# Patient Record
Sex: MALE | Race: BLACK OR AFRICAN AMERICAN | NOT HISPANIC OR LATINO | Employment: FULL TIME | URBAN - METROPOLITAN AREA
[De-identification: names, ages, dates, MRNs, and addresses within clinical notes are randomized per-mention and may not be internally consistent; named-entity substitution may affect disease eponyms.]

---

## 2017-01-11 ENCOUNTER — LAB VISIT (OUTPATIENT)
Dept: LAB | Facility: HOSPITAL | Age: 57
End: 2017-01-11
Attending: INTERNAL MEDICINE
Payer: MEDICARE

## 2017-01-11 DIAGNOSIS — Z94.0 KIDNEY REPLACED BY TRANSPLANT: ICD-10-CM

## 2017-01-11 LAB
BILIRUB UR QL STRIP: NEGATIVE
CLARITY UR REFRACT.AUTO: CLEAR
COLOR UR AUTO: YELLOW
CREAT UR-MCNC: 115 MG/DL
GLUCOSE UR QL STRIP: NEGATIVE
HGB UR QL STRIP: NEGATIVE
KETONES UR QL STRIP: NEGATIVE
LEUKOCYTE ESTERASE UR QL STRIP: NEGATIVE
NITRITE UR QL STRIP: NEGATIVE
PH UR STRIP: 8 [PH] (ref 5–8)
PROT UR QL STRIP: NEGATIVE
PROT UR-MCNC: <7 MG/DL
PROT/CREAT RATIO, UR: NORMAL
SP GR UR STRIP: 1.01 (ref 1–1.03)
URN SPEC COLLECT METH UR: NORMAL
UROBILINOGEN UR STRIP-ACNC: NEGATIVE EU/DL

## 2017-01-11 PROCEDURE — 84156 ASSAY OF PROTEIN URINE: CPT

## 2017-01-11 PROCEDURE — 81003 URINALYSIS AUTO W/O SCOPE: CPT

## 2017-01-18 ENCOUNTER — OFFICE VISIT (OUTPATIENT)
Dept: TRANSPLANT | Facility: CLINIC | Age: 57
End: 2017-01-18
Payer: MEDICARE

## 2017-01-18 VITALS
DIASTOLIC BLOOD PRESSURE: 101 MMHG | WEIGHT: 178.81 LBS | HEART RATE: 76 BPM | SYSTOLIC BLOOD PRESSURE: 153 MMHG | HEIGHT: 67 IN | TEMPERATURE: 98 F | OXYGEN SATURATION: 96 % | RESPIRATION RATE: 18 BRPM | BODY MASS INDEX: 28.07 KG/M2

## 2017-01-18 DIAGNOSIS — Z94.0 S/P KIDNEY TRANSPLANT: Primary | ICD-10-CM

## 2017-01-18 DIAGNOSIS — E83.39 HYPOPHOSPHATEMIA: Chronic | ICD-10-CM

## 2017-01-18 DIAGNOSIS — N18.2 CKD (CHRONIC KIDNEY DISEASE) STAGE 2, GFR 60-89 ML/MIN: ICD-10-CM

## 2017-01-18 DIAGNOSIS — E83.42 HYPOMAGNESEMIA: Chronic | ICD-10-CM

## 2017-01-18 DIAGNOSIS — E87.20 ACIDOSIS: ICD-10-CM

## 2017-01-18 DIAGNOSIS — I15.0 RENOVASCULAR HYPERTENSION: ICD-10-CM

## 2017-01-18 DIAGNOSIS — Z79.60 LONG-TERM USE OF IMMUNOSUPPRESSANT MEDICATION: ICD-10-CM

## 2017-01-18 DIAGNOSIS — Z29.89 PROPHYLACTIC IMMUNOTHERAPY: ICD-10-CM

## 2017-01-18 DIAGNOSIS — B34.8 BK VIREMIA: ICD-10-CM

## 2017-01-18 PROCEDURE — 99999 PR PBB SHADOW E&M-EST. PATIENT-LVL V: CPT | Mod: PBBFAC,,, | Performed by: NURSE PRACTITIONER

## 2017-01-18 PROCEDURE — 99215 OFFICE O/P EST HI 40 MIN: CPT | Mod: S$PBB,,, | Performed by: NURSE PRACTITIONER

## 2017-01-18 PROCEDURE — 99215 OFFICE O/P EST HI 40 MIN: CPT | Mod: PBBFAC | Performed by: NURSE PRACTITIONER

## 2017-01-18 RX ORDER — MYCOPHENOLATE MOFETIL 250 MG/1
250 CAPSULE ORAL 2 TIMES DAILY
Qty: 60 CAPSULE | Refills: 11 | Status: SHIPPED | OUTPATIENT
Start: 2017-01-18 | End: 2017-01-19 | Stop reason: SDUPTHER

## 2017-01-18 NOTE — PROGRESS NOTES
Kidney Post-Transplant Assessment    Referring Physician: Dino Schmidt  Current Nephrologist: Dino Schmidt    ORGAN: RIGHT KIDNEY  Donor Type:  - brain death  PHS Increased Risk: yes  Cold Ischemia: 355 mins  Induction Medications: campath - alemtuzumab (anti-cd52)    Subjective:     CC:  Reassessment of renal allograft function and management of chronic immunosuppression.    HPI:  Mr. Downing is a 56 y.o. year old Black or  male who received a  - brain death kidney transplant on 16.  He has CKD stage 2 - GFR 60-89 and his baseline creatinine is between 1.3-1.4. He takes tacrolimus for maintenance immunosuppression. He denies any recent hospitalizations or ER visits since his previous clinic visit.      HX: + BK, last three BK/ PCR (-)  Currently taking leflunomide 20 mg Qd     BP elevated. States it runs the same at home. Patient reports he is only taking Coreg BID and is not taking the nifedipine as prescribed.     Overall feels well. No health concerns today.   Denies chest pain, SOB, leg pain, abdominal pain or LUTs.    Past Medical History   Diagnosis Date    Anemia of chronic renal failure 12/3/2013    Awaiting kidney transplant since 6/3/11 12/3/2013    Blind right eye     Cataract     CKD (chronic kidney disease) stage 2, GFR 60-89 ml/min 2016    ESRD (end stage renal disease)     Hypertension     Latent TB - 11 - INH x 9 months 12/3/2013    Pulmonary hypertension - PASP 58mmHg () 12/3/2013    Secondary hyperparathyroidism, renal        Review of Systems   Constitutional: Negative for activity change, appetite change, chills, fatigue, fever and unexpected weight change.   HENT: Negative for congestion, facial swelling, postnasal drip, rhinorrhea, sinus pressure, sore throat and trouble swallowing.    Eyes: Negative for pain, redness and visual disturbance.   Respiratory: Negative for cough, chest tightness, shortness of breath and wheezing.  "   Cardiovascular: Negative.  Negative for chest pain, palpitations and leg swelling.   Gastrointestinal: Negative for abdominal pain, diarrhea, nausea and vomiting.   Genitourinary: Negative for dysuria, flank pain and urgency.   Musculoskeletal: Negative for gait problem, neck pain and neck stiffness.   Skin: Negative for rash.   Allergic/Immunologic: Positive for immunocompromised state. Negative for environmental allergies and food allergies.   Neurological: Negative for dizziness, weakness, light-headedness and headaches.   Psychiatric/Behavioral: Negative for agitation and confusion. The patient is not nervous/anxious.        Objective:     Blood pressure (!) 153/101, pulse 76, temperature 97.9 °F (36.6 °C), temperature source Oral, resp. rate 18, height 5' 7" (1.702 m), weight 81.1 kg (178 lb 12.7 oz), SpO2 96 %.body mass index is 28 kg/(m^2).    Physical Exam   Constitutional: He is oriented to person, place, and time. He appears well-developed and well-nourished.   HENT:   Head: Normocephalic.   Mouth/Throat: Oropharynx is clear and moist. No oropharyngeal exudate.   Eyes: Conjunctivae and EOM are normal. Pupils are equal, round, and reactive to light. No scleral icterus.   Neck: Normal range of motion. Neck supple.   Cardiovascular: Normal rate, regular rhythm and normal heart sounds.    Pulmonary/Chest: Effort normal and breath sounds normal.   Abdominal: Soft. Normal appearance and bowel sounds are normal. He exhibits no distension and no mass. There is no splenomegaly or hepatomegaly. There is no tenderness. There is no rebound, no guarding, no CVA tenderness, no tenderness at McBurney's point and negative Roberts's sign.       Musculoskeletal: Normal range of motion. He exhibits no edema.        Arms:  Lymphadenopathy:     He has no cervical adenopathy.   Neurological: He is alert and oriented to person, place, and time. He exhibits normal muscle tone. Coordination normal.   Skin: Skin is warm and dry. "   Psychiatric: He has a normal mood and affect. His behavior is normal.   Vitals reviewed.      Labs:  Lab Results   Component Value Date    WBC 5.60 01/11/2017    HGB 14.1 01/11/2017    HCT 41.5 01/11/2017     01/11/2017    K 5.4 (H) 01/11/2017     01/11/2017    CO2 26 01/11/2017    BUN 15 01/11/2017    CREATININE 1.4 01/11/2017    EGFRNONAA 55.4 (A) 01/11/2017    CALCIUM 10.1 01/11/2017    PHOS 2.6 (L) 01/11/2017    MG 1.5 (L) 01/11/2017    ALBUMIN 3.7 01/11/2017    ALBUMIN 3.7 01/11/2017    AST 20 01/11/2017    ALT 16 01/11/2017    UTPCR Unable to calculate 01/11/2017    .0 (H) 03/23/2016    TACROLIMUS 6.0 01/11/2017       No results found for: EXTANC, EXTWBC, EXTSEGS, EXTPLATELETS, EXTHEMOGLOBI, EXTHEMATOCRI, EXTCREATININ, EXTSODIUM, EXTPOTASSIUM, EXTBUN, EXTCO2, EXTCALCIUM, EXTPHOSPHORU, EXTGLUCOSE, EXTALBUMIN, EXTAST, EXTALT, EXTBILITOTAL, EXTLIPASE, EXTAMYLASE    No results found for: EXTCYCLOSLVL, EXTSIROLIMUS, EXTTACROLVL, EXTPROTCRE, EXTPTHINTACT, EXTPROTEINUA, EXTWBCUA, EXTRBCUA    Labs were reviewed with the patient.    Assessment:     1. S/P kidney transplant 1/28/2016    2. Long-term use of immunosuppressant medication    3. BK viremia    4. Prophylactic immunotherapy    5. Renovascular hypertension    6. CKD (chronic kidney disease) stage 2, GFR 60-89 ml/min    7. Acidosis    8. Hypophosphatemia    9. Hypomagnesemia        Plan:   Check BK and labs in 2 weeks  Ok to Stop leflunomide  Restart Cellcept 250 mg BID  If BK remains (-) in 2 weeks then increase MMF to 500 mg BID  --> recheck labs 2 weeks, then monthly     Follow-up:   1. CKD stage: 2 stable    2. Immunosuppression: No change, Prograf Trough 6.0, which is therapeutic--> target 5-7. Continue Prograf 3/2, restart  mg BID    Will continue to monitor for drug toxicities.     TACROLIMUS 6.0 01/11/2017     3. Allograft Function: Stable.  Continue good po hydration       Lab Results   Component Value Date    CREATININE 1.4  01/11/2017     eGFR if African American >60 mL/min/1.73 m^2 >60.0                 01/11/2017       4. Hypertension management: elevated , advise low salt diet and home BP monitoring    Coreg 12.5 mg BID, restart nifedipine 30 mg QD    5. Metabolic Bone Disease/Secondary Hyperparathyroidism:stable.    6. Electrolytes: Normal calcium. Bicarbonate is normal   01/11/2017   K 5.4 (H) 01/11/2017    01/11/2017   CO2 26 01/11/2017     CALCIUM 10.1 01/11/2017   PHOS 2.6 (L) 01/11/2017   MG 1.5 (L) 01/11/2017   Encouraged high phos low potassium diet  Continue mg ox 800 mg BID as prescribed     7. Anemia: stable. No need for intervention    Lab Results   Component Value Date    WBC 5.60 01/11/2017    HGB 14.1 01/11/2017    HCT 41.5 01/11/2017    MCV 83 01/11/2017     (L) 01/11/2017       8.  Cytopenias: no significant cytopenias will monitor as per our guidelines. Medicine list reviewed including potential causes of drug-induced cytopenias    9.Proteinuria: continue p/c ratio as per guidelines   UTPCR Unable to calculate 01/11/2017     10. BK virus infection screening:  Not detected, will continue to monitor/ guidelines  1/11/2017  BK Virus DNA, Blood Not detected Not detected   BK Virus DNA PCR, Quant, Blood <250 Copies/mL <250   Log BKV Copies/mL <2.40 Log (10) Copies/mL <2.40       11. Weight education: provided during the clinic visit   body mass index is 28 kg/(m^2).        12.Patient safety education regarding immunosuppression including prophylaxis posttransplant for CMV, PCP : Education provided about vaccination and prevention of infections       Follow-up:   Clinic: return to transplant clinic weekly for the first month after transplant; every 2 weeks during months 2-3; then at 6-, 9-, 12-, 18-, 24-, and 36- months post-transplant to reassess for complications from immunosuppression toxicity and monitor for rejection.  Annually thereafter.    Labs: since patient remains at high risk for rejection  and drug-related complications that warrant close monitoring, labs will be ordered as follows: continue twice weekly CBC, renal panel, and drug level for first month; then same labs once weekly through 3rd month post-transplant.  Urine for UA and protein/creatinine ratio monthly.  Urine BK - PCR at 1-, 3-, 6-, 9-, 12-, 18-, 24-, and 36- months post-transplant.  Hepatic panel at 1-, 2-, 3-, 6-, 9-, 12-, 18-, 24-, and 36- months post-transplant.    Delphine Amaral NP       Education:   Material provided to the patient.  Patient reminded to call with any health changes since these can be early signs of significant complications.  Also, I advised the patient to be sure any new medications or changes of old medications are discussed with either a pharmacist or physician knowledgeable with transplant to avoid rejection/drug toxicity related to significant drug interactions.    UNOS Patient Status  Functional Status: 80% - Normal activity with effort: some symptoms of disease  Physical Capacity: No Limitations

## 2017-01-18 NOTE — MR AVS SNAPSHOT
[facility]  [address]  [place] [address]  Phone: [phone]                  Cj Downing   2017 2:30 PM   Office Visit    Description:  Male : 1960   Provider:  Delphine Amaral NP   Department:  [facility]- Transplant           Reason for Visit     Kidney Transplant Evaluation           Diagnoses this Visit        Comments    S/P kidney transplant    -  Primary     Long-term use of immunosuppressant medication         BK viremia         Prophylactic immunotherapy         Renovascular hypertension         CKD (chronic kidney disease) stage 2, GFR 60-89 ml/min         Acidosis         Hypophosphatemia         Hypomagnesemia                To Do List           Future Appointments        Provider Department Dept Phone    2017 2:00 PM Derrick Ryan, NISSA [facility] - [facility] [phone]    2017 2:30 PM NISSA [facility] [phone]      Goals (5 Years of Data)     None      Follow-Up and Disposition     Return in about 6 months (around 2017).       These Medications        Disp Refills Start End    mycophenolate (CELLCEPT) 250 mg Cap 60 capsule 11 2017    Take 1 capsule (250 mg total) by mouth 2 (two) times daily. - Oral    Pharmacy: [facility] - [place], [place] - [address] Ph #: [phone]         [facility]     [facility] Care [facility] -  Assistance  Registered nurses in the [facility] provide clinical advisement, health education, appointment booking, and other advisory services.  Call for this free service at [phone].             Medications           Message regarding Medications     Verify the changes and/or additions to your medication regime listed below are the same as discussed with your clinician today.  If any of these changes or additions are incorrect, please notify your healthcare provider.        START taking these NEW  medications        Refills    mycophenolate (CELLCEPT) 250 mg Cap 11    Sig: Take 1 capsule (250 mg total) by mouth 2 (two) times daily.    Class: Normal    Route: Oral      STOP taking these medications     esomeprazole (NEXIUM) 20 MG capsule TAKE 1 CAPSULE BY MOUTH EVERY EVENING.           Verify that the below list of medications is an accurate representation of the medications you are currently taking.  If none reported, the list may be blank. If incorrect, please contact your healthcare provider. Carry this list with you in case of emergency.           Current Medications     carvedilol (COREG) 12.5 MG tablet Take 1 tablet (12.5 mg total) by mouth 2 (two) times daily with meals.    difluprednate (DUREZOL) 0.05 % Drop ophthalmic solution Place 1 drop into the right eye 4 (four) times daily.    fluorometholone 0.1% (FML) 0.1 % DrpS Place 1 drop into the left eye 2 (two) times daily.     leflunomide (ARAVA) 20 MG Tab Take 1 tablet (20 mg total) by mouth once daily.    magnesium oxide (MAG-OX) 400 mg tablet Take 2 tablets (800 mg total) by mouth 2 (two) times daily.    multivitamin (THERAGRAN) tablet Take 1 tablet by mouth once daily.    nifedipine 30 MG ORAL TR24 (PROCARDIA-XL) 30 MG (OSM) 24 hr tablet Take 1 tablet (30 mg total) by mouth once daily.    ranitidine (ZANTAC) 150 MG capsule Take 150 mg by mouth 2 (two) times daily.    sodium bicarbonate 650 MG tablet Take 2 tablets (1,300 mg total) by mouth 2 (two) times daily.    sodium polystyrene (KAYEXALATE) 15 gram/60 mL Susp Take 120 mLs (30 g total) by mouth once daily.    tacrolimus (PROGRAF) 1 MG Cap TAKE 3 MG AM AND 2 MG PM  Via oral Z94.0    mycophenolate (CELLCEPT) 250 mg Cap Take 1 capsule (250 mg total) by mouth 2 (two) times daily.           Clinical Reference Information           Vital Signs - Last Recorded  Most recent update: 1/18/2017  2:55 PM by Mayuri Fajardo MA    BP Pulse Temp Resp Ht Wt    (!) 153/101 (BP Location: Right arm, Patient  "Position: Sitting, BP Method: Automatic) 76 97.9 °F (36.6 °C) (Oral) 18 5' 7" (1.702 m) 81.1 kg (178 lb 12.7 oz)    SpO2 BMI             96% 28 kg/m2         Blood Pressure          Most Recent Value    BP  (!)  153/101      Allergies as of 1/18/2017     No Known Allergies      Immunizations Administered on Date of Encounter - 1/18/2017     None      Maintenance Dialysis History     Start End Type Comments Center    3/5/2007 1/28/2016 [facility]  [facility]            Current Dialysis Center Information     No center information to display.            Transplant Information        Txp Date Organ Coordinator Care Team    1/28/2016 Kidney Essence Wilkinson RN Referring Physician:  Dino Schmidt MD   Current Nephrologist:  Dino Schmidt MD   Surgeon:  Eduard Whitt MD   Assisting Surgeon:  Agusto Nevarez MD         Instructions    Restart:   Procardia XL ( nifedipine ) 30 mg tablet , take daily     Check BK and labs in 2 weeks  Ok to Stop leflunomide  Restart Cellcept 250 mg tabs two times daily             "

## 2017-01-18 NOTE — PATIENT INSTRUCTIONS
Restart:   Procardia XL ( nifedipine ) 30 mg tablet , take daily     Check BK and labs in 2 weeks  Ok to Stop leflunomide  Restart Cellcept 250 mg tabs two times daily

## 2017-01-19 DIAGNOSIS — Z94.0 S/P KIDNEY TRANSPLANT: ICD-10-CM

## 2017-01-19 DIAGNOSIS — Z79.60 LONG-TERM USE OF IMMUNOSUPPRESSANT MEDICATION: ICD-10-CM

## 2017-01-19 RX ORDER — MYCOPHENOLATE MOFETIL 250 MG/1
250 CAPSULE ORAL 2 TIMES DAILY
Qty: 60 CAPSULE | Refills: 11 | Status: SHIPPED | OUTPATIENT
Start: 2017-01-19 | End: 2017-06-29 | Stop reason: SDUPTHER

## 2017-01-19 RX ORDER — NIFEDIPINE 30 MG/1
30 TABLET, EXTENDED RELEASE ORAL DAILY
Qty: 30 TABLET | Refills: 11 | Status: SHIPPED | OUTPATIENT
Start: 2017-01-19 | End: 2017-06-25

## 2017-01-19 NOTE — TELEPHONE ENCOUNTER
----- Message from Julia Puga sent at 1/18/2017  4:28 PM CST -----  Contact: pt   Please call pt about 2 RX that he does have, please call

## 2017-02-01 ENCOUNTER — LAB VISIT (OUTPATIENT)
Dept: LAB | Facility: HOSPITAL | Age: 57
End: 2017-02-01
Attending: INTERNAL MEDICINE
Payer: MEDICARE

## 2017-02-01 DIAGNOSIS — N05.8 BK VIRUS NEPHROPATHY: ICD-10-CM

## 2017-02-01 DIAGNOSIS — Z94.0 KIDNEY REPLACED BY TRANSPLANT: ICD-10-CM

## 2017-02-01 DIAGNOSIS — B33.8 BK VIRUS NEPHROPATHY: ICD-10-CM

## 2017-02-01 LAB
ALBUMIN SERPL BCP-MCNC: 3.9 G/DL
ANION GAP SERPL CALC-SCNC: 9 MMOL/L
BASOPHILS # BLD AUTO: 0.04 K/UL
BASOPHILS NFR BLD: 0.8 %
BUN SERPL-MCNC: 14 MG/DL
CALCIUM SERPL-MCNC: 10.2 MG/DL
CHLORIDE SERPL-SCNC: 108 MMOL/L
CO2 SERPL-SCNC: 25 MMOL/L
CREAT SERPL-MCNC: 1.2 MG/DL
DIFFERENTIAL METHOD: NORMAL
EOSINOPHIL # BLD AUTO: 0.2 K/UL
EOSINOPHIL NFR BLD: 2.9 %
ERYTHROCYTE [DISTWIDTH] IN BLOOD BY AUTOMATED COUNT: 14.4 %
EST. GFR  (AFRICAN AMERICAN): >60 ML/MIN/1.73 M^2
EST. GFR  (NON AFRICAN AMERICAN): >60 ML/MIN/1.73 M^2
GLUCOSE SERPL-MCNC: 92 MG/DL
HCT VFR BLD AUTO: 41.1 %
HGB BLD-MCNC: 14.1 G/DL
LYMPHOCYTES # BLD AUTO: 1.5 K/UL
LYMPHOCYTES NFR BLD: 29.2 %
MAGNESIUM SERPL-MCNC: 1.8 MG/DL
MCH RBC QN AUTO: 28.4 PG
MCHC RBC AUTO-ENTMCNC: 34.3 %
MCV RBC AUTO: 83 FL
MONOCYTES # BLD AUTO: 0.6 K/UL
MONOCYTES NFR BLD: 11.2 %
NEUTROPHILS # BLD AUTO: 2.9 K/UL
NEUTROPHILS NFR BLD: 55.9 %
PHOSPHATE SERPL-MCNC: 2.8 MG/DL
PLATELET # BLD AUTO: 153 K/UL
PMV BLD AUTO: 11.8 FL
POTASSIUM SERPL-SCNC: 5.4 MMOL/L
RBC # BLD AUTO: 4.96 M/UL
SODIUM SERPL-SCNC: 142 MMOL/L
TACROLIMUS BLD-MCNC: 6.5 NG/ML
WBC # BLD AUTO: 5.11 K/UL

## 2017-02-01 PROCEDURE — 80197 ASSAY OF TACROLIMUS: CPT

## 2017-02-01 PROCEDURE — 36415 COLL VENOUS BLD VENIPUNCTURE: CPT

## 2017-02-01 PROCEDURE — 87799 DETECT AGENT NOS DNA QUANT: CPT

## 2017-02-01 PROCEDURE — 83735 ASSAY OF MAGNESIUM: CPT

## 2017-02-01 PROCEDURE — 80069 RENAL FUNCTION PANEL: CPT

## 2017-02-01 PROCEDURE — 85025 COMPLETE CBC W/AUTO DIFF WBC: CPT

## 2017-02-03 LAB
BK VIRUS DNA PCR, QUANT, BLOOD: <250 COPIES/ML
BK VIRUS DNA, BLOOD: DETECTED
LOG BKV COPIES/ML: <2.4 LOG (10) COPIES/ML

## 2017-02-07 DIAGNOSIS — Z94.0 KIDNEY REPLACED BY TRANSPLANT: Primary | ICD-10-CM

## 2017-02-15 ENCOUNTER — LAB VISIT (OUTPATIENT)
Dept: LAB | Facility: HOSPITAL | Age: 57
End: 2017-02-15
Attending: INTERNAL MEDICINE
Payer: MEDICARE

## 2017-02-15 DIAGNOSIS — N05.8 BK VIRUS NEPHROPATHY: ICD-10-CM

## 2017-02-15 DIAGNOSIS — Z94.0 KIDNEY REPLACED BY TRANSPLANT: ICD-10-CM

## 2017-02-15 DIAGNOSIS — B33.8 BK VIRUS NEPHROPATHY: ICD-10-CM

## 2017-02-15 LAB
ALBUMIN SERPL BCP-MCNC: 3.7 G/DL
ANION GAP SERPL CALC-SCNC: 6 MMOL/L
BASOPHILS # BLD AUTO: 0.06 K/UL
BASOPHILS NFR BLD: 1 %
BUN SERPL-MCNC: 14 MG/DL
CALCIUM SERPL-MCNC: 9.7 MG/DL
CHLORIDE SERPL-SCNC: 108 MMOL/L
CO2 SERPL-SCNC: 25 MMOL/L
CREAT SERPL-MCNC: 1.3 MG/DL
DIFFERENTIAL METHOD: ABNORMAL
EOSINOPHIL # BLD AUTO: 0.2 K/UL
EOSINOPHIL NFR BLD: 3.3 %
ERYTHROCYTE [DISTWIDTH] IN BLOOD BY AUTOMATED COUNT: 14.4 %
EST. GFR  (AFRICAN AMERICAN): >60 ML/MIN/1.73 M^2
EST. GFR  (NON AFRICAN AMERICAN): >60 ML/MIN/1.73 M^2
GLUCOSE SERPL-MCNC: 95 MG/DL
HCT VFR BLD AUTO: 40.3 %
HGB BLD-MCNC: 13.8 G/DL
LYMPHOCYTES # BLD AUTO: 1.9 K/UL
LYMPHOCYTES NFR BLD: 32.9 %
MAGNESIUM SERPL-MCNC: 1.2 MG/DL
MCH RBC QN AUTO: 27.9 PG
MCHC RBC AUTO-ENTMCNC: 34.2 %
MCV RBC AUTO: 82 FL
MONOCYTES # BLD AUTO: 0.5 K/UL
MONOCYTES NFR BLD: 8.1 %
NEUTROPHILS # BLD AUTO: 3.2 K/UL
NEUTROPHILS NFR BLD: 54.4 %
PHOSPHATE SERPL-MCNC: 2.7 MG/DL
PLATELET # BLD AUTO: 146 K/UL
PMV BLD AUTO: 11.3 FL
POTASSIUM SERPL-SCNC: 4.9 MMOL/L
RBC # BLD AUTO: 4.94 M/UL
SODIUM SERPL-SCNC: 139 MMOL/L
TACROLIMUS BLD-MCNC: 6.4 NG/ML
WBC # BLD AUTO: 5.81 K/UL

## 2017-02-15 PROCEDURE — 80197 ASSAY OF TACROLIMUS: CPT

## 2017-02-15 PROCEDURE — 87799 DETECT AGENT NOS DNA QUANT: CPT

## 2017-02-15 PROCEDURE — 80069 RENAL FUNCTION PANEL: CPT

## 2017-02-15 PROCEDURE — 85025 COMPLETE CBC W/AUTO DIFF WBC: CPT

## 2017-02-15 PROCEDURE — 83735 ASSAY OF MAGNESIUM: CPT

## 2017-02-17 LAB
BK VIRUS DNA PCR, QUANT, BLOOD: <250 COPIES/ML
BK VIRUS DNA, BLOOD: NOT DETECTED
LOG BKV COPIES/ML: <2.4 LOG (10) COPIES/ML

## 2017-02-21 RX ORDER — LANOLIN ALCOHOL/MO/W.PET/CERES
800 CREAM (GRAM) TOPICAL 3 TIMES DAILY
Qty: 180 TABLET | Refills: 11 | Status: SHIPPED | OUTPATIENT
Start: 2017-02-21 | End: 2018-01-31 | Stop reason: SDUPTHER

## 2017-02-21 NOTE — TELEPHONE ENCOUNTER
----- Message from Shilpa Rothman MD sent at 2/15/2017 10:41 AM CST -----  Mg low --> if he is taking MagOx 800 mg BID then increase to 800 mg TID.

## 2017-03-15 ENCOUNTER — LAB VISIT (OUTPATIENT)
Dept: LAB | Facility: HOSPITAL | Age: 57
End: 2017-03-15
Attending: INTERNAL MEDICINE
Payer: MEDICARE

## 2017-03-15 DIAGNOSIS — Z94.0 KIDNEY REPLACED BY TRANSPLANT: ICD-10-CM

## 2017-03-15 DIAGNOSIS — N05.8 BK VIRUS NEPHROPATHY: ICD-10-CM

## 2017-03-15 DIAGNOSIS — B33.8 BK VIRUS NEPHROPATHY: ICD-10-CM

## 2017-03-15 LAB
ALBUMIN SERPL BCP-MCNC: 3.7 G/DL
ANION GAP SERPL CALC-SCNC: 5 MMOL/L
BASOPHILS # BLD AUTO: 0.06 K/UL
BASOPHILS NFR BLD: 0.9 %
BUN SERPL-MCNC: 14 MG/DL
CALCIUM SERPL-MCNC: 10.1 MG/DL
CHLORIDE SERPL-SCNC: 107 MMOL/L
CO2 SERPL-SCNC: 26 MMOL/L
CREAT SERPL-MCNC: 1.4 MG/DL
DIFFERENTIAL METHOD: ABNORMAL
EOSINOPHIL # BLD AUTO: 0.2 K/UL
EOSINOPHIL NFR BLD: 2.4 %
ERYTHROCYTE [DISTWIDTH] IN BLOOD BY AUTOMATED COUNT: 14.6 %
EST. GFR  (AFRICAN AMERICAN): >60 ML/MIN/1.73 M^2
EST. GFR  (NON AFRICAN AMERICAN): 55.4 ML/MIN/1.73 M^2
GLUCOSE SERPL-MCNC: 96 MG/DL
HCT VFR BLD AUTO: 43.6 %
HGB BLD-MCNC: 14.7 G/DL
LYMPHOCYTES # BLD AUTO: 2 K/UL
LYMPHOCYTES NFR BLD: 30.2 %
MAGNESIUM SERPL-MCNC: 1.8 MG/DL
MCH RBC QN AUTO: 28.3 PG
MCHC RBC AUTO-ENTMCNC: 33.7 %
MCV RBC AUTO: 84 FL
MONOCYTES # BLD AUTO: 0.8 K/UL
MONOCYTES NFR BLD: 11.8 %
NEUTROPHILS # BLD AUTO: 3.7 K/UL
NEUTROPHILS NFR BLD: 54.4 %
PHOSPHATE SERPL-MCNC: 3 MG/DL
PLATELET # BLD AUTO: 173 K/UL
PMV BLD AUTO: 11.6 FL
POTASSIUM SERPL-SCNC: 4.9 MMOL/L
RBC # BLD AUTO: 5.2 M/UL
SODIUM SERPL-SCNC: 138 MMOL/L
TACROLIMUS BLD-MCNC: 7.9 NG/ML
WBC # BLD AUTO: 6.76 K/UL

## 2017-03-15 PROCEDURE — 87799 DETECT AGENT NOS DNA QUANT: CPT

## 2017-03-15 PROCEDURE — 80069 RENAL FUNCTION PANEL: CPT

## 2017-03-15 PROCEDURE — 36415 COLL VENOUS BLD VENIPUNCTURE: CPT

## 2017-03-15 PROCEDURE — 83735 ASSAY OF MAGNESIUM: CPT

## 2017-03-15 PROCEDURE — 85025 COMPLETE CBC W/AUTO DIFF WBC: CPT

## 2017-03-15 PROCEDURE — 80197 ASSAY OF TACROLIMUS: CPT

## 2017-04-06 ENCOUNTER — TELEPHONE (OUTPATIENT)
Dept: TRANSPLANT | Facility: CLINIC | Age: 57
End: 2017-04-06

## 2017-04-06 NOTE — TELEPHONE ENCOUNTER
----- Message from Lina Hayden sent at 4/5/2017  4:10 PM CDT -----  Contact: patient  Patient calling to get a refill on his prograf patient states he will be using a different pharmacy. Please call

## 2017-04-07 DIAGNOSIS — D63.1 ANEMIA OF CHRONIC RENAL FAILURE, STAGE 3 (MODERATE): Chronic | ICD-10-CM

## 2017-04-07 DIAGNOSIS — N18.2 CKD (CHRONIC KIDNEY DISEASE) STAGE 2, GFR 60-89 ML/MIN: ICD-10-CM

## 2017-04-07 DIAGNOSIS — N25.81 SECONDARY HYPERPARATHYROIDISM, RENAL: ICD-10-CM

## 2017-04-07 DIAGNOSIS — I10 ESSENTIAL HYPERTENSION: ICD-10-CM

## 2017-04-07 DIAGNOSIS — Z94.0 S/P KIDNEY TRANSPLANT: ICD-10-CM

## 2017-04-07 DIAGNOSIS — E55.9 VITAMIN D DEFICIENCY DISEASE: ICD-10-CM

## 2017-04-07 DIAGNOSIS — N18.30 ANEMIA OF CHRONIC RENAL FAILURE, STAGE 3 (MODERATE): Chronic | ICD-10-CM

## 2017-04-08 RX ORDER — CARVEDILOL 12.5 MG/1
TABLET ORAL
Qty: 60 TABLET | Refills: 11 | Status: SHIPPED | OUTPATIENT
Start: 2017-04-08 | End: 2018-04-17 | Stop reason: SDUPTHER

## 2017-04-12 ENCOUNTER — LAB VISIT (OUTPATIENT)
Dept: LAB | Facility: HOSPITAL | Age: 57
End: 2017-04-12
Attending: INTERNAL MEDICINE
Payer: MEDICARE

## 2017-04-12 DIAGNOSIS — Z94.0 KIDNEY REPLACED BY TRANSPLANT: ICD-10-CM

## 2017-04-12 LAB
ALBUMIN SERPL BCP-MCNC: 3.7 G/DL
ANION GAP SERPL CALC-SCNC: 8 MMOL/L
BASOPHILS # BLD AUTO: 0.03 K/UL
BASOPHILS NFR BLD: 0.4 %
BUN SERPL-MCNC: 15 MG/DL
CALCIUM SERPL-MCNC: 10 MG/DL
CHLORIDE SERPL-SCNC: 106 MMOL/L
CO2 SERPL-SCNC: 23 MMOL/L
CREAT SERPL-MCNC: 1.4 MG/DL
DIFFERENTIAL METHOD: NORMAL
EOSINOPHIL # BLD AUTO: 0.1 K/UL
EOSINOPHIL NFR BLD: 2.1 %
ERYTHROCYTE [DISTWIDTH] IN BLOOD BY AUTOMATED COUNT: 14.4 %
EST. GFR  (AFRICAN AMERICAN): >60 ML/MIN/1.73 M^2
EST. GFR  (NON AFRICAN AMERICAN): 55.4 ML/MIN/1.73 M^2
GLUCOSE SERPL-MCNC: 97 MG/DL
HCT VFR BLD AUTO: 43.2 %
HGB BLD-MCNC: 14.6 G/DL
LYMPHOCYTES # BLD AUTO: 2 K/UL
LYMPHOCYTES NFR BLD: 30.1 %
MAGNESIUM SERPL-MCNC: 1.5 MG/DL
MCH RBC QN AUTO: 28.1 PG
MCHC RBC AUTO-ENTMCNC: 33.8 %
MCV RBC AUTO: 83 FL
MONOCYTES # BLD AUTO: 0.8 K/UL
MONOCYTES NFR BLD: 11.2 %
NEUTROPHILS # BLD AUTO: 3.8 K/UL
NEUTROPHILS NFR BLD: 56.2 %
PHOSPHATE SERPL-MCNC: 3 MG/DL
PLATELET # BLD AUTO: 156 K/UL
PMV BLD AUTO: 11 FL
POTASSIUM SERPL-SCNC: 4.7 MMOL/L
RBC # BLD AUTO: 5.2 M/UL
SODIUM SERPL-SCNC: 137 MMOL/L
TACROLIMUS BLD-MCNC: 5.7 NG/ML
WBC # BLD AUTO: 6.77 K/UL

## 2017-04-12 PROCEDURE — 80069 RENAL FUNCTION PANEL: CPT

## 2017-04-12 PROCEDURE — 80197 ASSAY OF TACROLIMUS: CPT

## 2017-04-12 PROCEDURE — 83735 ASSAY OF MAGNESIUM: CPT

## 2017-04-12 PROCEDURE — 87799 DETECT AGENT NOS DNA QUANT: CPT

## 2017-04-12 PROCEDURE — 85025 COMPLETE CBC W/AUTO DIFF WBC: CPT

## 2017-05-17 ENCOUNTER — LAB VISIT (OUTPATIENT)
Dept: LAB | Facility: HOSPITAL | Age: 57
End: 2017-05-17
Attending: INTERNAL MEDICINE
Payer: MEDICARE

## 2017-05-17 DIAGNOSIS — Z94.0 KIDNEY REPLACED BY TRANSPLANT: ICD-10-CM

## 2017-05-17 LAB
ALBUMIN SERPL BCP-MCNC: 3.7 G/DL
ANION GAP SERPL CALC-SCNC: 4 MMOL/L
BASOPHILS # BLD AUTO: 0.04 K/UL
BASOPHILS NFR BLD: 0.7 %
BUN SERPL-MCNC: 13 MG/DL
CALCIUM SERPL-MCNC: 10.3 MG/DL
CHLORIDE SERPL-SCNC: 108 MMOL/L
CO2 SERPL-SCNC: 28 MMOL/L
CREAT SERPL-MCNC: 1.4 MG/DL
DIFFERENTIAL METHOD: ABNORMAL
EOSINOPHIL # BLD AUTO: 0.1 K/UL
EOSINOPHIL NFR BLD: 2.2 %
ERYTHROCYTE [DISTWIDTH] IN BLOOD BY AUTOMATED COUNT: 14.3 %
EST. GFR  (AFRICAN AMERICAN): >60 ML/MIN/1.73 M^2
EST. GFR  (NON AFRICAN AMERICAN): 55.4 ML/MIN/1.73 M^2
GLUCOSE SERPL-MCNC: 103 MG/DL
HCT VFR BLD AUTO: 40.9 %
HGB BLD-MCNC: 13.8 G/DL
LYMPHOCYTES # BLD AUTO: 1.7 K/UL
LYMPHOCYTES NFR BLD: 31.8 %
MAGNESIUM SERPL-MCNC: 1.8 MG/DL
MCH RBC QN AUTO: 28.2 PG
MCHC RBC AUTO-ENTMCNC: 33.7 %
MCV RBC AUTO: 84 FL
MONOCYTES # BLD AUTO: 0.4 K/UL
MONOCYTES NFR BLD: 7.7 %
NEUTROPHILS # BLD AUTO: 3.2 K/UL
NEUTROPHILS NFR BLD: 57.6 %
PHOSPHATE SERPL-MCNC: 2.5 MG/DL
PLATELET # BLD AUTO: 170 K/UL
PMV BLD AUTO: 10.7 FL
POTASSIUM SERPL-SCNC: 5.1 MMOL/L
RBC # BLD AUTO: 4.89 M/UL
SODIUM SERPL-SCNC: 140 MMOL/L
TACROLIMUS BLD-MCNC: 4.9 NG/ML
WBC # BLD AUTO: 5.48 K/UL

## 2017-05-17 PROCEDURE — 80197 ASSAY OF TACROLIMUS: CPT

## 2017-05-17 PROCEDURE — 80069 RENAL FUNCTION PANEL: CPT

## 2017-05-17 PROCEDURE — 83735 ASSAY OF MAGNESIUM: CPT

## 2017-05-17 PROCEDURE — 85025 COMPLETE CBC W/AUTO DIFF WBC: CPT

## 2017-05-17 PROCEDURE — 87799 DETECT AGENT NOS DNA QUANT: CPT

## 2017-05-17 NOTE — PROGRESS NOTES
Prograf slightly lower than goal of 5-7 but previously stable on this dose. WBC stable and BK negative --> increase MMF from 250 mg BID to 500 mg BID. Repeat labs including Prograf in 1-2 weeks

## 2017-05-18 ENCOUNTER — TELEPHONE (OUTPATIENT)
Dept: TRANSPLANT | Facility: CLINIC | Age: 57
End: 2017-05-18

## 2017-05-18 NOTE — TELEPHONE ENCOUNTER
----- Message from Shilpa Rothman MD sent at 5/17/2017  4:43 PM CDT -----  Prograf slightly lower than goal of 5-7 but previously stable on this dose. WBC stable and BK negative --> increase MMF from 250 mg BID to 500 mg BID. Repeat labs including Prograf in 1-2 weeks

## 2017-06-14 ENCOUNTER — LAB VISIT (OUTPATIENT)
Dept: LAB | Facility: HOSPITAL | Age: 57
End: 2017-06-14
Attending: INTERNAL MEDICINE
Payer: MEDICARE

## 2017-06-14 DIAGNOSIS — Z94.0 KIDNEY REPLACED BY TRANSPLANT: ICD-10-CM

## 2017-06-14 LAB
ALBUMIN SERPL BCP-MCNC: 3.9 G/DL
ALBUMIN SERPL BCP-MCNC: 3.9 G/DL
ALP SERPL-CCNC: 58 U/L
ALT SERPL W/O P-5'-P-CCNC: 10 U/L
ANION GAP SERPL CALC-SCNC: 7 MMOL/L
AST SERPL-CCNC: 19 U/L
BASOPHILS # BLD AUTO: 0.03 K/UL
BASOPHILS NFR BLD: 0.4 %
BILIRUB DIRECT SERPL-MCNC: 0.2 MG/DL
BILIRUB SERPL-MCNC: 0.6 MG/DL
BUN SERPL-MCNC: 12 MG/DL
CALCIUM SERPL-MCNC: 10.3 MG/DL
CHLORIDE SERPL-SCNC: 108 MMOL/L
CO2 SERPL-SCNC: 25 MMOL/L
CREAT SERPL-MCNC: 1.4 MG/DL
DIFFERENTIAL METHOD: ABNORMAL
EOSINOPHIL # BLD AUTO: 0.1 K/UL
EOSINOPHIL NFR BLD: 2 %
ERYTHROCYTE [DISTWIDTH] IN BLOOD BY AUTOMATED COUNT: 15 %
EST. GFR  (AFRICAN AMERICAN): >60 ML/MIN/1.73 M^2
EST. GFR  (NON AFRICAN AMERICAN): 55.4 ML/MIN/1.73 M^2
GLUCOSE SERPL-MCNC: 95 MG/DL
HCT VFR BLD AUTO: 44.5 %
HGB BLD-MCNC: 14.9 G/DL
LYMPHOCYTES # BLD AUTO: 2.6 K/UL
LYMPHOCYTES NFR BLD: 36 %
MAGNESIUM SERPL-MCNC: 1.9 MG/DL
MCH RBC QN AUTO: 27.9 PG
MCHC RBC AUTO-ENTMCNC: 33.5 %
MCV RBC AUTO: 83 FL
MONOCYTES # BLD AUTO: 0.5 K/UL
MONOCYTES NFR BLD: 7.5 %
NEUTROPHILS # BLD AUTO: 3.8 K/UL
NEUTROPHILS NFR BLD: 54 %
PHOSPHATE SERPL-MCNC: 2.9 MG/DL
PLATELET # BLD AUTO: 163 K/UL
PMV BLD AUTO: 10.6 FL
POTASSIUM SERPL-SCNC: 5.3 MMOL/L
PROT SERPL-MCNC: 8.1 G/DL
RBC # BLD AUTO: 5.34 M/UL
SODIUM SERPL-SCNC: 140 MMOL/L
TACROLIMUS BLD-MCNC: 7 NG/ML
WBC # BLD AUTO: 7.09 K/UL

## 2017-06-14 PROCEDURE — 84075 ASSAY ALKALINE PHOSPHATASE: CPT

## 2017-06-14 PROCEDURE — 85025 COMPLETE CBC W/AUTO DIFF WBC: CPT

## 2017-06-14 PROCEDURE — 83735 ASSAY OF MAGNESIUM: CPT

## 2017-06-14 PROCEDURE — 80197 ASSAY OF TACROLIMUS: CPT

## 2017-06-14 PROCEDURE — 87799 DETECT AGENT NOS DNA QUANT: CPT

## 2017-06-14 PROCEDURE — 80069 RENAL FUNCTION PANEL: CPT

## 2017-06-25 ENCOUNTER — HOSPITAL ENCOUNTER (EMERGENCY)
Facility: HOSPITAL | Age: 57
Discharge: HOME | End: 2017-06-25
Attending: EMERGENCY MEDICINE
Payer: MEDICARE

## 2017-06-25 VITALS
BODY MASS INDEX: 29.03 KG/M2 | TEMPERATURE: 98 F | DIASTOLIC BLOOD PRESSURE: 93 MMHG | OXYGEN SATURATION: 98 % | SYSTOLIC BLOOD PRESSURE: 136 MMHG | WEIGHT: 185 LBS | RESPIRATION RATE: 16 BRPM | HEIGHT: 67 IN | HEART RATE: 78 BPM

## 2017-06-25 DIAGNOSIS — S99.911A RIGHT ANKLE INJURY, INITIAL ENCOUNTER: ICD-10-CM
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
## 2017-06-25 DIAGNOSIS — S96.911A RIGHT ANKLE STRAIN, INITIAL ENCOUNTER: Primary | ICD-10-CM
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
## 2017-06-25 PROCEDURE — 99283 EMERGENCY DEPT VISIT LOW MDM: CPT | Mod: 25

## 2017-06-25 PROCEDURE — 25000003 PHARM REV CODE 250: Performed by: PHYSICIAN ASSISTANT

## 2017-06-25 PROCEDURE — 99284 EMERGENCY DEPT VISIT MOD MDM: CPT | Mod: ,,, | Performed by: PHYSICIAN ASSISTANT

## 2017-06-25 RX ORDER — HYDROCODONE BITARTRATE AND ACETAMINOPHEN 5; 325 MG/1; MG/1
1 TABLET ORAL EVERY 6 HOURS PRN
Qty: 12 TABLET | Refills: 0 | Status: SHIPPED | OUTPATIENT
Start: 2017-06-25 | End: 2017-06-29 | Stop reason: SDUPTHER

## 2017-06-25 RX ORDER — HYDROCODONE BITARTRATE AND ACETAMINOPHEN 5; 325 MG/1; MG/1
1 TABLET ORAL
Status: COMPLETED | OUTPATIENT
Start: 2017-06-25 | End: 2017-06-25

## 2017-06-25 RX ADMIN — HYDROCODONE BITARTRATE AND ACETAMINOPHEN 1 TABLET: 5; 325 TABLET ORAL at 12:06

## 2017-06-25 NOTE — ED NOTES
Patient identifiers verified and correct for Mr Downing  C/C: ankle pain  APPEARANCE: awake and alert in NAD.  SKIN: warm, dry and intact. No breakdown or bruising.  MUSCULOSKELETAL: Patient moving all extremities spontaneously, no obvious swelling or deformities noted. Ambulates independently. Edema to right ankle with positive pulses, sensation and mvmt,.  RESPIRATORY: no shortness of breath.  CARDIAC: ; 2+ distal pulses; no peripheral edema  ABDOMEN: S/ND/NT, Denies nausea, normoactive bowel sounds present in all four quadrants. Normal stool pattern.  : voids spontaneously without difficulty.  Neurologic: AAO x 4; follows commands equal strength in all extremities; denies numbness/tingling. PERRLA

## 2017-06-25 NOTE — ED PROVIDER NOTES
Encounter Date: 6/25/2017       History     Chief Complaint   Patient presents with    Ankle Injury     57 y/o male with history of ESRD s/p kidney transplant 1/28/2016, HTN, presents to the ER with chief complaint of right ankle pain since an injury at 9:15 this morning.  The patient was walking down stairs carrying a microwave. He missed a step and tripped down 2 steps.  He twisted his right ankle during the fall.  He is unable to put pressure on the right ankle.  His pain is rated 9/10.  He denies head injury, additional joint pain, or other complaints at this time.            Review of patient's allergies indicates:  No Known Allergies  Past Medical History:   Diagnosis Date    Anemia of chronic renal failure 12/3/2013    Awaiting kidney transplant since 6/3/11 12/3/2013    Blind right eye     Cataract     CKD (chronic kidney disease) stage 2, GFR 60-89 ml/min 2/24/2016    ESRD (end stage renal disease) 2008    Hypertension     Hypomagnesemia 1/18/2017    Hypophosphatemia 1/18/2017    Latent TB - 4/7/11 - INH x 9 months 12/3/2013    Pulmonary hypertension - PASP 58mmHg (9/13) 12/3/2013    Secondary hyperparathyroidism, renal      Past Surgical History:   Procedure Laterality Date    APPENDECTOMY      AV FISTULA PLACEMENT      EUSEBIO    COLONOSCOPY      CORNEAL TRANSPLANT  1997    LEFT EYE    CORNEAL TRANSPLANT  2010    RIGHT EYE    CORNEAL TRANSPLANT  2000     LEFT EYE    EYE SURGERY      KIDNEY TRANSPLANT      TONSILLECTOMY         Social History   Substance Use Topics    Smoking status: Never Smoker    Smokeless tobacco: Never Used    Alcohol use No     Review of Systems   Constitutional: Negative for chills and fever.   HENT: Negative for sore throat.    Respiratory: Negative for shortness of breath.    Cardiovascular: Negative for chest pain.   Gastrointestinal: Negative for nausea.   Genitourinary: Negative for dysuria.   Musculoskeletal: Positive for arthralgias and joint swelling.  Negative for back pain.   Skin: Negative for color change, rash and wound.   Neurological: Negative for weakness and numbness.   Hematological: Does not bruise/bleed easily.       Physical Exam     Initial Vitals [06/25/17 1014]   BP Pulse Resp Temp SpO2   (!) 136/93 78 16 98 °F (36.7 °C) 98 %      MAP       107.33         Physical Exam    Constitutional: He appears well-developed and well-nourished.   HENT:   Head: Atraumatic.   Mouth/Throat: Oropharynx is clear and moist.   Eyes: Conjunctivae and EOM are normal. Pupils are equal, round, and reactive to light.   Neck: Normal range of motion. Neck supple.   Cardiovascular: Normal rate and regular rhythm.   Pulmonary/Chest: Breath sounds normal. No respiratory distress. He has no wheezes. He has no rhonchi. He has no rales.   Abdominal: Soft. Bowel sounds are normal. There is no tenderness.   Musculoskeletal:        Right ankle: He exhibits decreased range of motion and swelling. He exhibits no ecchymosis, no deformity and normal pulse. Tenderness. Lateral malleolus tenderness found. Achilles tendon normal. Achilles tendon exhibits no pain and no defect.        Right lower leg: He exhibits no bony tenderness and no swelling.        Right foot: Normal.   Neurological: He is alert and oriented to person, place, and time. He has normal strength.   Skin: No rash noted.   Psychiatric: He has a normal mood and affect.         ED Course   Procedures  Labs Reviewed - No data to display                APC / Resident Notes:   Patient presents to the ER with chief complaint of right ankle pain since a mechanical fall this morning.  The patient tripped on stairs while carrying a microwave and twisted his right ankle.  He has been unable to ambulate since the injury.  I will order x-ray to rule out fracture.  I will give Norco for pain control and we have applied ice to the ankle.    Patient's x-rays negative for acute fracture or dislocation.  Patient will be placed in an ankle  air splint and provided with crutches to use for one week for treatment of ankle strain.  He is instructed on R.I.C.E therapy.  I'll prescribe Norco for pain control, he is advised to take stool softeners while taking this medication and only use Patient is comfortable with this plan.  He is given return precautions.  He is advised to follow-up with PCP within one week and ortho if symptoms continue in 1-2 weeks.  I discussed the care of this patient with my supervising MD.                      ED Course     Clinical Impression:   The primary encounter diagnosis was Right ankle strain, initial encounter. A diagnosis of Right ankle injury, initial encounter was also pertinent to this visit.                           MONA Guerrero  06/25/17 [id number]

## 2017-06-29 ENCOUNTER — OFFICE VISIT (OUTPATIENT)
Dept: INTERNAL MEDICINE | Facility: CLINIC | Age: 57
End: 2017-06-29
Payer: MEDICARE

## 2017-06-29 VITALS
WEIGHT: 180.13 LBS | HEIGHT: 67 IN | SYSTOLIC BLOOD PRESSURE: 132 MMHG | HEART RATE: 84 BPM | DIASTOLIC BLOOD PRESSURE: 84 MMHG | BODY MASS INDEX: 28.27 KG/M2

## 2017-06-29 DIAGNOSIS — N18.2 CKD (CHRONIC KIDNEY DISEASE) STAGE 2, GFR 60-89 ML/MIN: ICD-10-CM

## 2017-06-29 DIAGNOSIS — M25.571 ACUTE RIGHT ANKLE PAIN: ICD-10-CM

## 2017-06-29 DIAGNOSIS — Z79.60 LONG-TERM USE OF IMMUNOSUPPRESSANT MEDICATION: ICD-10-CM

## 2017-06-29 DIAGNOSIS — Z94.0 S/P KIDNEY TRANSPLANT: ICD-10-CM

## 2017-06-29 DIAGNOSIS — Z00.00 HEALTH CARE MAINTENANCE: Primary | ICD-10-CM

## 2017-06-29 DIAGNOSIS — I15.0 RENOVASCULAR HYPERTENSION: ICD-10-CM

## 2017-06-29 PROCEDURE — 99999 PR PBB SHADOW E&M-EST. PATIENT-LVL III: CPT | Mod: PBBFAC,,, | Performed by: INTERNAL MEDICINE

## 2017-06-29 PROCEDURE — 99213 OFFICE O/P EST LOW 20 MIN: CPT | Mod: PBBFAC | Performed by: INTERNAL MEDICINE

## 2017-06-29 PROCEDURE — 99214 OFFICE O/P EST MOD 30 MIN: CPT | Mod: S$PBB,,, | Performed by: INTERNAL MEDICINE

## 2017-06-29 RX ORDER — MYCOPHENOLATE MOFETIL 250 MG/1
500 CAPSULE ORAL 2 TIMES DAILY
Qty: 60 CAPSULE | Refills: 11 | COMMUNITY
Start: 2017-06-29 | End: 2017-06-30 | Stop reason: SDUPTHER

## 2017-06-29 RX ORDER — HYDROCODONE BITARTRATE AND ACETAMINOPHEN 5; 325 MG/1; MG/1
1 TABLET ORAL EVERY 6 HOURS PRN
Qty: 20 TABLET | Refills: 0 | Status: SHIPPED | OUTPATIENT
Start: 2017-06-29 | End: 2017-07-09

## 2017-06-29 NOTE — PROGRESS NOTES
"Subjective:       Patient ID: Cj Downing is a 56 y.o. male.    Chief Complaint: Annual Exam and Establish Care    HPI   55 yo M here to establish care and have yearly preventative healthcare visit.     He has hx kidney transplant 1/28/16. He has CKD stage 2 - GFR 60-89 and his baseline creatinine is between 1.3-1.4.   + BK, last three BK/ PCR (-) was on leflunomide 20 mg Qd. Finished?    Mmf 500 bid increased from 250mg bid by phone on 5/18   tac 3mg am; 2mg pm  Coreg 12.5 BID  Nifedipine    He sprained his ankle 5 days ago when missed step. He is still unable to put pressure on it. Was unable to put pressure on it immediately after. He continues to be unable to bear weight at all. He has been crawling or hoping around his home.     Review of Systems   Constitutional: Negative for fever.   HENT: Negative.    Eyes: Negative.    Respiratory: Negative for shortness of breath.    Cardiovascular: Negative for chest pain and leg swelling.   Gastrointestinal: Negative for abdominal pain, diarrhea, nausea and vomiting.   Genitourinary: Negative.    Musculoskeletal: Negative for arthralgias.   Skin: Negative for rash.   Psychiatric/Behavioral: Negative.        Objective:   /84   Pulse 84   Ht 5' 7" (1.702 m)   Wt 81.7 kg (180 lb 1.9 oz)   BMI 28.21 kg/m²      Physical Exam   Constitutional: He is oriented to person, place, and time. He appears well-developed and well-nourished.   In wheelchair with crutches   HENT:   Head: Normocephalic and atraumatic.   Eyes: Conjunctivae and EOM are normal. Pupils are equal, round, and reactive to light.   Neck: Neck supple. No thyromegaly present.   Cardiovascular: Normal rate, regular rhythm and normal heart sounds.    No murmur heard.  Pulmonary/Chest: Effort normal and breath sounds normal. No respiratory distress. He has no wheezes.   Abdominal: Soft. Bowel sounds are normal. He exhibits no distension. There is no tenderness.   Musculoskeletal: Normal range of " motion.   Impaired ROM of ankle, swelling over lateral malleolus   Neurological: He is alert and oriented to person, place, and time.   Skin: Skin is warm and dry. No rash noted.   Psychiatric: He has a normal mood and affect. Judgment and thought content normal.   Vitals reviewed.      Assessment:       1. Health care maintenance    2. S/P kidney transplant 1/28/2016    3. Long-term use of immunosuppressant medication    4. CKD (chronic kidney disease) stage 2, GFR 60-89 ml/min    5. Renovascular hypertension    6. Acute right ankle pain        Plan:       Cj was seen today for annual exam and establish care.    Diagnoses and all orders for this visit:    Health care maintenance  He did pneumonia and possibly tdap vaccines at [facility] [address]. Will send SALONI.     S/P kidney transplant 1/28/2016  Long-term use of immunosuppressant medication  CKD (chronic kidney disease) stage 2, GFR 60-89 ml/min  Renovascular hypertension  Followed by transplant  BP in goal range  Continue current meds    Acute right ankle pain  -     Ambulatory referral to Orthopedics  -     hydrocodone-acetaminophen 5-325mg (NORCO) 5-325 mg per tablet; Take 1 tablet by mouth every 6 (six) hours as needed.

## 2017-06-30 ENCOUNTER — TELEPHONE (OUTPATIENT)
Dept: INTERNAL MEDICINE | Facility: CLINIC | Age: 57
End: 2017-06-30

## 2017-06-30 ENCOUNTER — OFFICE VISIT (OUTPATIENT)
Dept: ORTHOPEDICS | Facility: CLINIC | Age: 57
End: 2017-06-30
Payer: MEDICARE

## 2017-06-30 VITALS — HEIGHT: 67 IN | WEIGHT: 181.69 LBS | BODY MASS INDEX: 28.52 KG/M2

## 2017-06-30 DIAGNOSIS — M25.471 RIGHT ANKLE SWELLING: ICD-10-CM

## 2017-06-30 DIAGNOSIS — Z94.0 S/P KIDNEY TRANSPLANT: ICD-10-CM

## 2017-06-30 DIAGNOSIS — Z79.60 LONG-TERM USE OF IMMUNOSUPPRESSANT MEDICATION: ICD-10-CM

## 2017-06-30 DIAGNOSIS — M25.571 ACUTE RIGHT ANKLE PAIN: ICD-10-CM

## 2017-06-30 DIAGNOSIS — S93.401A SPRAIN OF RIGHT ANKLE, UNSPECIFIED LIGAMENT, INITIAL ENCOUNTER: Primary | ICD-10-CM

## 2017-06-30 DIAGNOSIS — Z91.81 STATUS POST FALL: ICD-10-CM

## 2017-06-30 PROCEDURE — 99999 PR PBB SHADOW E&M-EST. PATIENT-LVL III: CPT | Mod: PBBFAC,,, | Performed by: NURSE PRACTITIONER

## 2017-06-30 PROCEDURE — 99213 OFFICE O/P EST LOW 20 MIN: CPT | Mod: S$PBB,,, | Performed by: NURSE PRACTITIONER

## 2017-06-30 PROCEDURE — 99213 OFFICE O/P EST LOW 20 MIN: CPT | Mod: PBBFAC | Performed by: NURSE PRACTITIONER

## 2017-06-30 RX ORDER — MYCOPHENOLATE MOFETIL 250 MG/1
500 CAPSULE ORAL 2 TIMES DAILY
Qty: 120 CAPSULE | Refills: 11 | Status: SHIPPED | OUTPATIENT
Start: 2017-06-30 | End: 2017-06-30 | Stop reason: SDUPTHER

## 2017-06-30 RX ORDER — MYCOPHENOLATE MOFETIL 250 MG/1
500 CAPSULE ORAL 2 TIMES DAILY
Qty: 120 CAPSULE | Refills: 11 | Status: SHIPPED | OUTPATIENT
Start: 2017-06-30 | End: 2017-07-06 | Stop reason: SDUPTHER

## 2017-06-30 RX ORDER — MYCOPHENOLATE MOFETIL 250 MG/1
500 CAPSULE ORAL 2 TIMES DAILY
Qty: 120 CAPSULE | Refills: 11 | COMMUNITY
Start: 2017-06-30 | End: 2017-06-30 | Stop reason: SDUPTHER

## 2017-06-30 NOTE — PROGRESS NOTES
SUBJECTIVE:     Chief Complaint & History of Present Illness:  Cj Downing is a 56 y.o. year old male who presents for evaluation of constant right ankle pain which started 5 days ago 6/25/2017. There is a history of trauma, he twisted his ankle going down the stairs.  The pain is located in the lateral aspect of the ankle.  The pain is described as aching.  It is aggravated by direct pressure and walking . He states that until yesterday he really couldn;t walk on it but he has start to put some light pressure on the ankle as tolerated. Associated symptoms include ability to bear weight, but with some pain.  There is not radiation into the foot.  Previous treatments include Aircast which he d/c because it was uncomfortable, rest, crutches, and norco which have provided minimal relief.  There is not a history of previous surgery to the ankle.  The patient does not use an assistive device per baseline.    Review of patient's allergies indicates:  No Known Allergies      Current Outpatient Prescriptions   Medication Sig Dispense Refill    carvedilol (COREG) 12.5 MG tablet TAKE 1 TABLET BY MOUTH TWICE DAILY WITH MEALS. 60 tablet 11    fluorometholone 0.1% (FML) 0.1 % DrpS Place 1 drop into the left eye 2 (two) times daily.       hydrocodone-acetaminophen 5-325mg (NORCO) 5-325 mg per tablet Take 1 tablet by mouth every 6 (six) hours as needed. 20 tablet 0    magnesium oxide (MAG-OX) 400 mg tablet Take 2 tablets (800 mg total) by mouth 3 (three) times daily. 180 tablet 11    multivitamin (THERAGRAN) tablet Take 1 tablet by mouth once daily.      sodium bicarbonate 650 MG tablet Take 2 tablets (1,300 mg total) by mouth 2 (two) times daily. 240 tablet 11    sodium polystyrene (KAYEXALATE) 15 gram/60 mL Susp Take 120 mLs (30 g total) by mouth once daily. (Patient taking differently: Take 30 g by mouth once daily. States he takes BID) 3600 mL 11    tacrolimus (PROGRAF) 1 MG Cap TAKE 3 MG AM AND 2 MG PM  Via  "oral Z94.0 150 capsule 11    mycophenolate (CELLCEPT) 250 mg Cap Take 2 capsules (500 mg total) by mouth 2 (two) times daily. 120 capsule 11     Current Facility-Administered Medications   Medication Dose Route Frequency Provider Last Rate Last Dose    pentamidine inhalation solution 300 mg  300 mg Inhalation Q30 Days Shilpa Rothman MD   300 mg at 09/15/16 1507       Past Medical History:   Diagnosis Date    Anemia of chronic renal failure 12/3/2013    Blind right eye     Cataract     CKD (chronic kidney disease) stage 2, GFR 60-89 ml/min 2/24/2016    ESRD (end stage renal disease) 2008    Hypertension     Hypomagnesemia 1/18/2017    Hypophosphatemia 1/18/2017    Latent TB - 4/7/11 - INH x 9 months 12/3/2013    Pulmonary hypertension - PASP 58mmHg (9/13) 12/3/2013    S/P kidney transplant 1/28/2016     Secondary hyperparathyroidism, renal        Past Surgical History:   Procedure Laterality Date    APPENDECTOMY      AV FISTULA PLACEMENT      EUSEBIO    COLONOSCOPY      CORNEAL TRANSPLANT  1997    LEFT EYE    CORNEAL TRANSPLANT  2010    RIGHT EYE    CORNEAL TRANSPLANT  2000     LEFT EYE    EYE SURGERY      KIDNEY TRANSPLANT      TONSILLECTOMY         Review of Systems:  Review of Systems   Constitution: Negative for chills, fever, weakness and malaise/fatigue.   Eyes: Negative for visual disturbance.   Cardiovascular: Negative for chest pain.   Hematologic/Lymphatic: Negative for bleeding problem.   Skin: Negative for color change, flushing, poor wound healing and rash.   Musculoskeletal: Positive for joint pain and joint swelling. Negative for falls, gout, muscle cramps, muscle weakness, myalgias and stiffness.   Neurological: Negative for dizziness, light-headedness, loss of balance, numbness and paresthesias.   Psychiatric/Behavioral: Negative for altered mental status.         OBJECTIVE:     PHYSICAL EXAM:  Height: 5' 7" (170.2 cm) Weight: 82.4 kg (181 lb 10.5 oz)  General    Nursing note " reviewed.  Constitutional: He is oriented to person, place, and time. He appears well-developed and well-nourished. No distress.   HENT:   Head: Atraumatic.   Nose: Nose normal.   Eyes: Conjunctivae and EOM are normal. Right eye exhibits no discharge. Left eye exhibits no discharge.   Pulmonary/Chest: Effort normal. No respiratory distress.   Neurological: He is alert and oriented to person, place, and time.   Psychiatric: He has a normal mood and affect. His behavior is normal. Judgment and thought content normal.         Right Ankle/Foot Exam     Inspection   Deformity: absent  Erythema: absent  Bruising: Ankle - absent Foot - absent  Effusion: Ankle effusion: Moderate lateral ankle swelling, mild globally. Foot - absent    Swelling   The patient is swollen on the lateral malleolus.    Pain   The patient exhibits pain of the lateral malleolus and peroneals.    Other   Sensation: normal    Comments:  ROM inhibited s/t to acute swelling, pain  Pt is NVI  No wounds        Vascular Exam     Right Pulses    Posterior Tibial:      2+        Edema  Right Lower Leg: absent    RADIOGRAPHS:  Xray reviewed of the right ankle showing no fracture or dislocation     ASSESSMENT/PLAN:     Plan:  Acute right ankle sprain- symptoms improving   - CAM boot, WBAT  - No NSAIDs, pt is 2016 kidney transplant pt  - Has Norco PRN for pain  - Rest, Ice, Elevate ankle  - RTC 2 wk, sooner as needed   - Pt in agreement with plan

## 2017-06-30 NOTE — TELEPHONE ENCOUNTER
----- Message from Clara Pichardo sent at 6/30/2017  8:29 AM CDT -----  Hello,  After reviewing this patient's medication profiles (in [facility] & the retail pharmacy system), it appears that the dose of cellcept needs to be entered in epic as a medication order, in order to be medicare compliant        Will you please send a new prescription for cellcept  that matches the patient's current Dose and Quantity to the [facility]?    Thank you!  Your [facility] Team.    Clara Whalen, Certified Pharmacy Tech  [facility]  [phone] phone  [phone] fax

## 2017-06-30 NOTE — LETTER
June 30, 2017      [facility]  [address], [address]  [facility] [address]  Phone: [phone]       Patient: Cj Downing   YOB: 1960  Date of Visit: 06/30/2017    To Whom It May Concern:    Cj was at [facility] on 06/30/2017. He must remain out of work for medical reasons until his next visit on 7/14/17. If necessary a new note will be provided at that time. If you have any questions or concerns, or if I can be of further assistance, please do not hesitate to contact me.    Sincerely,    Jessica Jung, NP

## 2017-06-30 NOTE — TELEPHONE ENCOUNTER
Per your phone note on 5/18/17 and per pt dose of MMF was increased from 250mg BID to 500mg BID. Can you please verify this is correct and send new order to pharmacy? The old order was still in for 250mg BID.

## 2017-06-30 NOTE — TELEPHONE ENCOUNTER
----- Message from Clara Pichardo sent at 6/30/2017  8:29 AM CDT -----  Hello,  After reviewing this patient's medication profiles (in [facility] & the retail pharmacy system), it appears that the dose of cellcept needs to be entered in epic as a medication order, in order to be medicare compliant        Will you please send a new prescription for cellcept  that matches the patient's current Dose and Quantity to the [facility]?    Thank you!  Your [facility] Team.    Clara Whalen, Certified Pharmacy Tech  [facility]  [phone] phone  [phone] fax

## 2017-06-30 NOTE — LETTER
June 30, 2017      Kajal Arora MD  [address]  [facility] [address]           [facility]  [address]  [facility] [address]  Phone: [phone]          Patient: Cj Downing   MR Number: [id number]   YOB: 1960   Date of Visit: 6/30/2017       Dear Dr. Kajal Arora:    Thank you for referring Cj Downing to me for evaluation. Attached you will find relevant portions of my assessment and plan of care.    If you have questions, please do not hesitate to call me. I look forward to following Cj Downing along with you.    Sincerely,    Jessica Jung, NP    Enclosure  CC:  No Recipients    If you would like to receive this communication electronically, please contact [email or web address] or [phone] to request more information on [employer] Link access.    For providers and/or their staff who would like to refer a patient to [facility], please contact us through our one-stop-shop provider referral line, [facility], at [phone].    If you feel you have received this communication in error or would no longer like to receive these types of communications, please e-mail [email or web address]

## 2017-07-06 DIAGNOSIS — Z94.0 S/P KIDNEY TRANSPLANT: ICD-10-CM

## 2017-07-06 DIAGNOSIS — Z79.60 LONG-TERM USE OF IMMUNOSUPPRESSANT MEDICATION: ICD-10-CM

## 2017-07-06 RX ORDER — MYCOPHENOLATE MOFETIL 250 MG/1
500 CAPSULE ORAL 2 TIMES DAILY
Qty: 120 CAPSULE | Refills: 11 | Status: SHIPPED | OUTPATIENT
Start: 2017-07-06 | End: 2017-07-28 | Stop reason: SINTOL

## 2017-07-14 ENCOUNTER — OFFICE VISIT (OUTPATIENT)
Dept: ORTHOPEDICS | Facility: CLINIC | Age: 57
End: 2017-07-14
Payer: MEDICARE

## 2017-07-14 VITALS — HEIGHT: 67 IN | WEIGHT: 179.44 LBS | BODY MASS INDEX: 28.16 KG/M2

## 2017-07-14 DIAGNOSIS — S93.401D SPRAIN OF RIGHT ANKLE, UNSPECIFIED LIGAMENT, SUBSEQUENT ENCOUNTER: Primary | ICD-10-CM

## 2017-07-14 LAB
BK VIRUS DNA PCR, QUANT, BLOOD: NORMAL
BK VIRUS DNA, BLOOD: NORMAL
LOG BKV COPIES/ML: NORMAL

## 2017-07-14 PROCEDURE — 99213 OFFICE O/P EST LOW 20 MIN: CPT | Mod: S$PBB,,, | Performed by: PHYSICIAN ASSISTANT

## 2017-07-14 PROCEDURE — 99213 OFFICE O/P EST LOW 20 MIN: CPT | Mod: PBBFAC | Performed by: PHYSICIAN ASSISTANT

## 2017-07-14 PROCEDURE — 99999 PR PBB SHADOW E&M-EST. PATIENT-LVL III: CPT | Mod: PBBFAC,,, | Performed by: PHYSICIAN ASSISTANT

## 2017-07-14 RX ORDER — TRAMADOL HYDROCHLORIDE 50 MG/1
50 TABLET ORAL
Qty: 30 TABLET | Refills: 0 | Status: SHIPPED | OUTPATIENT
Start: 2017-07-14 | End: 2017-07-24

## 2017-07-14 NOTE — LETTER
July 14, 2017    Cj Downing  [address]  [place] [address]             [facility]  [address]  [facility] [address]  Phone: [phone] To whom it may concern:    Please excuse Mr. Downing for 1 week due to right ankle injury. He may return to work with no restrictions on 7/21/17.     If you have any questions or concerns, please don't hesitate to call.    Sincerely,        Shila Aranda PA-C

## 2017-07-14 NOTE — PROGRESS NOTES
Subjective:      Patient ID: Cj Downing is a 56 y.o. male.    Chief Complaint: No chief complaint on file.    HPI  Patient returns for 2 week f/u for his right ankle sprain. He saw Thelma Jung NP 2 weeks ago and was placed in a boot. He wore the boot for about 1.5 weeks and d/c it because it was too heavy. He has been wearing an ankle sleeve. He says the ankle has gotten much better since last visit. He has mild pain with walking. Pain is worse at night. He takes norco prn. He has mild swelling.   Review of Systems   Constitution: Negative for chills, fever and night sweats.   Cardiovascular: Negative for chest pain.   Respiratory: Negative for cough and shortness of breath.    Hematologic/Lymphatic: Does not bruise/bleed easily.   Skin: Negative for color change.   Gastrointestinal: Negative for heartburn.   Genitourinary: Negative for dysuria.   Neurological: Negative for numbness and paresthesias.   Psychiatric/Behavioral: Negative for altered mental status.   Allergic/Immunologic: Negative for persistent infections.         Objective:            General    Vitals reviewed.  Constitutional: He is oriented to person, place, and time. He appears well-developed and well-nourished.   Cardiovascular: Normal rate.    Neurological: He is alert and oriented to person, place, and time.         Right Ankle/Foot Exam     Inspection   Erythema: absent    Tenderness   The patient is tender to palpation of the lateral malleolus.    Range of Motion   Ankle Joint   Dorsiflexion: abnormal   Plantar flexion: abnormal   Subtalar Joint   Inversion: abnormal   Eversion: abnormal     Tests   Anterior drawer: negative  Varus tilt: negative    Other   Sensation: normal    Comments:  Mild swelling lateral ankle. ROM is a little limited due to stiffness.         Vascular Exam     Right Pulses  Dorsalis Pedis:      2+                      Assessment:       Encounter Diagnosis   Name Primary?    Sprain of right ankle, unspecified  ligament, subsequent encounter Yes          Plan:       Patient will continue the ankle sleeve for another 2 weeks. Work on ROM daily. Ice and elevate for swelling. Prescription for tramadol given with 0 refills. Discussed dept pain medicine policy. He will have to get future refills from his PCP. RTC in 3 weeks for re-evaluation.

## 2017-07-20 DIAGNOSIS — Z94.0 KIDNEY REPLACED BY TRANSPLANT: Primary | ICD-10-CM

## 2017-07-26 ENCOUNTER — LAB VISIT (OUTPATIENT)
Dept: LAB | Facility: HOSPITAL | Age: 57
End: 2017-07-26
Attending: INTERNAL MEDICINE
Payer: MEDICARE

## 2017-07-26 DIAGNOSIS — Z94.0 KIDNEY REPLACED BY TRANSPLANT: ICD-10-CM

## 2017-07-26 LAB
ALBUMIN SERPL BCP-MCNC: 3.7 G/DL
ALBUMIN SERPL BCP-MCNC: 3.7 G/DL
ALP SERPL-CCNC: 55 U/L
ALT SERPL W/O P-5'-P-CCNC: 12 U/L
ANION GAP SERPL CALC-SCNC: 8 MMOL/L
AST SERPL-CCNC: 18 U/L
BASOPHILS # BLD AUTO: 0.03 K/UL
BASOPHILS NFR BLD: 0.4 %
BILIRUB DIRECT SERPL-MCNC: 0.3 MG/DL
BILIRUB SERPL-MCNC: 0.6 MG/DL
BUN SERPL-MCNC: 13 MG/DL
CALCIUM SERPL-MCNC: 9.8 MG/DL
CHLORIDE SERPL-SCNC: 107 MMOL/L
CO2 SERPL-SCNC: 23 MMOL/L
CREAT SERPL-MCNC: 1.4 MG/DL
DIFFERENTIAL METHOD: ABNORMAL
EOSINOPHIL # BLD AUTO: 0.2 K/UL
EOSINOPHIL NFR BLD: 2.3 %
ERYTHROCYTE [DISTWIDTH] IN BLOOD BY AUTOMATED COUNT: 15 %
EST. GFR  (AFRICAN AMERICAN): >60 ML/MIN/1.73 M^2
EST. GFR  (NON AFRICAN AMERICAN): 55.4 ML/MIN/1.73 M^2
GLUCOSE SERPL-MCNC: 103 MG/DL
HCT VFR BLD AUTO: 39.9 %
HGB BLD-MCNC: 13.7 G/DL
LYMPHOCYTES # BLD AUTO: 2.2 K/UL
LYMPHOCYTES NFR BLD: 32.5 %
MAGNESIUM SERPL-MCNC: 1.5 MG/DL
MCH RBC QN AUTO: 27.8 PG
MCHC RBC AUTO-ENTMCNC: 34.3 G/DL
MCV RBC AUTO: 81 FL
MONOCYTES # BLD AUTO: 0.6 K/UL
MONOCYTES NFR BLD: 9.4 %
NEUTROPHILS # BLD AUTO: 3.8 K/UL
NEUTROPHILS NFR BLD: 55.1 %
PHOSPHATE SERPL-MCNC: 2.7 MG/DL
PLATELET # BLD AUTO: 191 K/UL
PMV BLD AUTO: 10.5 FL
POTASSIUM SERPL-SCNC: 5 MMOL/L
PROT SERPL-MCNC: 7.8 G/DL
RBC # BLD AUTO: 4.93 M/UL
SODIUM SERPL-SCNC: 138 MMOL/L
TACROLIMUS BLD-MCNC: 8.4 NG/ML
WBC # BLD AUTO: 6.81 K/UL

## 2017-07-26 PROCEDURE — 80197 ASSAY OF TACROLIMUS: CPT

## 2017-07-26 PROCEDURE — 83735 ASSAY OF MAGNESIUM: CPT

## 2017-07-26 PROCEDURE — 36415 COLL VENOUS BLD VENIPUNCTURE: CPT

## 2017-07-26 PROCEDURE — 87799 DETECT AGENT NOS DNA QUANT: CPT

## 2017-07-26 PROCEDURE — 80069 RENAL FUNCTION PANEL: CPT

## 2017-07-26 PROCEDURE — 82247 BILIRUBIN TOTAL: CPT

## 2017-07-26 PROCEDURE — 85025 COMPLETE CBC W/AUTO DIFF WBC: CPT

## 2017-07-26 PROCEDURE — 84075 ASSAY ALKALINE PHOSPHATASE: CPT

## 2017-07-27 LAB
BK VIRUS DNA PCR, QUANT, BLOOD: 194 COPIES/ML
BK VIRUS DNA, BLOOD: DETECTED
LOG BKV COPIES/ML: 2.29 LOG (10) COPIES/ML

## 2017-07-28 ENCOUNTER — TELEPHONE (OUTPATIENT)
Dept: TRANSPLANT | Facility: CLINIC | Age: 57
End: 2017-07-28

## 2017-07-28 NOTE — PROGRESS NOTES
+ BK: kidney function is stable. Please hold MMF, check CYLEX and BK PCR every 2 weeks. Will see him at the clinic next week.

## 2017-07-28 NOTE — TELEPHONE ENCOUNTER
----- Message from Sharon Jones MD sent at 7/28/2017  2:25 PM CDT -----  + BK: kidney function is stable. Please hold MMF, check CYLEX and BK PCR every 2 weeks. Will see him at the clinic next week.

## 2017-07-29 LAB — LEFLUNOMIDE METABOLITE: <0.01 MCG/ML

## 2017-08-02 ENCOUNTER — OFFICE VISIT (OUTPATIENT)
Dept: ORTHOPEDICS | Facility: CLINIC | Age: 57
End: 2017-08-02
Payer: MEDICARE

## 2017-08-02 VITALS — WEIGHT: 180.31 LBS | BODY MASS INDEX: 28.3 KG/M2 | HEIGHT: 67 IN

## 2017-08-02 DIAGNOSIS — S93.401D SPRAIN OF RIGHT ANKLE, UNSPECIFIED LIGAMENT, SUBSEQUENT ENCOUNTER: Primary | ICD-10-CM

## 2017-08-02 PROCEDURE — 3008F BODY MASS INDEX DOCD: CPT | Mod: ,,, | Performed by: PHYSICIAN ASSISTANT

## 2017-08-02 PROCEDURE — 99213 OFFICE O/P EST LOW 20 MIN: CPT | Mod: PBBFAC | Performed by: PHYSICIAN ASSISTANT

## 2017-08-02 PROCEDURE — 99213 OFFICE O/P EST LOW 20 MIN: CPT | Mod: S$PBB,,, | Performed by: PHYSICIAN ASSISTANT

## 2017-08-02 PROCEDURE — 99999 PR PBB SHADOW E&M-EST. PATIENT-LVL III: CPT | Mod: PBBFAC,,, | Performed by: PHYSICIAN ASSISTANT

## 2017-08-02 NOTE — PROGRESS NOTES
Subjective:      Patient ID: Cj Downing is a 56 y.o. male.    Chief Complaint: No chief complaint on file.    HPI  Patient returns for 5 week f/u for his right ankle sprain. He wears the boot while working to help support. He works 5 hours/day. Pain tends to be worse towards the end of the work day. Outside of work, he wears tennis shoes. He says he limps a little. He takes tramadol prn. He says the swelling is better. He has been working on ROM.   Review of Systems   Constitution: Negative for chills, fever and night sweats.   Cardiovascular: Negative for chest pain.   Respiratory: Negative for cough and shortness of breath.    Hematologic/Lymphatic: Does not bruise/bleed easily.   Skin: Negative for color change.   Gastrointestinal: Negative for heartburn.   Genitourinary: Negative for dysuria.   Neurological: Negative for numbness and paresthesias.   Psychiatric/Behavioral: Negative for altered mental status.   Allergic/Immunologic: Negative for persistent infections.         Objective:            General    Vitals reviewed.  Constitutional: He is oriented to person, place, and time. He appears well-developed and well-nourished.   Cardiovascular: Normal rate.    Neurological: He is alert and oriented to person, place, and time.         Right Ankle/Foot Exam     Inspection   Erythema: absent    Tenderness   The patient is tender to palpation of the lateral malleolus.    Range of Motion   The patient has normal right ankle ROM.    Tests   Anterior drawer: negative  Varus tilt: negative    Other   Sensation: normal        Vascular Exam     Right Pulses  Dorsalis Pedis:      2+                      Assessment:       Encounter Diagnosis   Name Primary?    Sprain of right ankle, unspecified ligament, subsequent encounter Yes          Plan:       Order was placed for PT. He will work on weaning out of the boot. Continue ROM. RTC in 4 weeks for re-evaluation.

## 2017-08-08 NOTE — PROGRESS NOTES
Kidney Post-Transplant Assessment    Referring Physician: Dino Schmidt  Current Nephrologist: Dino Schmidt    ORGAN: RIGHT KIDNEY  Donor Type:  - brain death  PHS Increased Risk: yes  Cold Ischemia: 355 mins  Induction Medications: campath - alemtuzumab (anti-cd52)    Subjective:     CC:  Reassessment of renal allograft function and management of chronic immunosuppression.    HPI:  Mr. Downing is a 56 y.o. year old Black or  male who has a History of ESRD presumed secondary to HTN. He was on dialysis since 3/7/2007,  until receiving PHS increased risk DBD DDRT (Campath induction, CMV D+/R+) on 16 .   He has CKD stage 2 - GFR 60-89 and his baseline creatinine is between 1.2-1.4. He takes tacrolimus for maintenance immunosuppression. Recent hospitalizations or ER visits since his previous clinic visit.-- ED for ankle strain . Reports ankle is feeling better, but he still has a slight limp.      HX: + BK, in which he was  tx with leflunomide     BK PCR detected/ 194  With 2.29 copies --> MMF was held with BK PCR and cylex  every other week.    Overall feels well. No health concerns today.   Denies chest pain, SOB, leg pain, abdominal pain or LUTs.      Past Medical History:   Diagnosis Date    Anemia of chronic renal failure 12/3/2013    Blind right eye     Cataract     CKD (chronic kidney disease) stage 2, GFR 60-89 ml/min 2016    ESRD (end stage renal disease)     Hypertension     Hypomagnesemia 2017    Hypophosphatemia 2017    Latent TB - 11 - INH x 9 months 12/3/2013    Pulmonary hypertension - PASP 58mmHg () 12/3/2013    S/P kidney transplant 2016     Secondary hyperparathyroidism, renal        Review of Systems   Constitutional: Negative for activity change, appetite change, chills, fatigue, fever and unexpected weight change.   HENT: Negative for congestion, facial swelling, postnasal drip, rhinorrhea, sinus pressure, sore  "throat and trouble swallowing.    Eyes: Positive for visual disturbance. Negative for pain and redness.        Blind right eye   Respiratory: Negative for cough, chest tightness, shortness of breath and wheezing.    Cardiovascular: Negative.  Negative for chest pain, palpitations and leg swelling.   Gastrointestinal: Negative for abdominal pain, diarrhea, nausea and vomiting.   Genitourinary: Negative for dysuria, flank pain and urgency.   Musculoskeletal: Negative for gait problem, neck pain and neck stiffness.   Skin: Negative for rash.   Allergic/Immunologic: Positive for immunocompromised state. Negative for environmental allergies and food allergies.   Neurological: Negative for dizziness, weakness, light-headedness and headaches.   Psychiatric/Behavioral: Negative for agitation and confusion. The patient is not nervous/anxious.        Objective:     Blood pressure (!) 149/103, pulse 76, temperature 98.7 °F (37.1 °C), temperature source Oral, resp. rate 16, height 5' 7" (1.702 m), weight 82.7 kg (182 lb 5.1 oz), SpO2 99 %.body mass index is 28.56 kg/m².    Physical Exam   Musculoskeletal:        Arms:      Labs:  Lab Results   Component Value Date    WBC 6.81 07/26/2017    HGB 13.7 (L) 07/26/2017    HCT 39.9 (L) 07/26/2017     07/26/2017    K 5.0 07/26/2017     07/26/2017    CO2 23 07/26/2017    BUN 13 07/26/2017    CREATININE 1.4 07/26/2017    EGFRNONAA 55.4 (A) 07/26/2017    CALCIUM 9.8 07/26/2017    PHOS 2.7 07/26/2017    MG 1.5 (L) 07/26/2017    ALBUMIN 3.7 07/26/2017    ALBUMIN 3.7 07/26/2017    AST 18 07/26/2017    ALT 12 07/26/2017    UTPCR Unable to calculate 07/26/2017    .0 (H) 03/23/2016    TACROLIMUS 8.4 07/26/2017       No results found for: EXTANC, EXTWBC, EXTSEGS, EXTPLATELETS, EXTHEMOGLOBI, EXTHEMATOCRI, EXTCREATININ, EXTSODIUM, EXTPOTASSIUM, EXTBUN, EXTCO2, EXTCALCIUM, EXTPHOSPHORU, EXTGLUCOSE, EXTALBUMIN, EXTAST, EXTALT, EXTBILITOTAL, EXTLIPASE, EXTAMYLASE    No results " found for: EXTCYCLOSLVL, EXTSIROLIMUS, EXTTACROLVL, EXTPROTCRE, EXTPTHINTACT, EXTPROTEINUA, EXTWBCUA, EXTRBCUA    Labs were reviewed with the patient.    Assessment:     1. S/P kidney transplant 1/28/2016    2. Prophylactic immunotherapy    3. Long-term use of immunosuppressant medication    4. Hypomagnesemia    5. Renovascular hypertension    6. CKD (chronic kidney disease) stage 2, GFR 60-89 ml/min    7. Anemia of chronic renal failure, stage 2 (mild)    8. BK viremia        Plan:   Cylex  Continue BK PCR every other week  Scheduled on 8/16     Patient needs to establish  with general nephrology    Referred to general nephrology     Follow-up:   1. CKD stage: 2  stable    2. Immunosuppression:   Prograf Trough 8.4, which is supra therapeutic--> but will leave at current dose and adjust as needed when cylex is resulted. .  Prograf 3/2, MMF  On hold d/t BK viremia   Will continue to monitor for drug toxicities.      3. Allograft Function: Stable.  Continue good po hydration .        Lab Results   Component Value Date    CREATININE 1.4 07/26/2017 7/26/2017  American >60 mL/min/1.73 m^2 >60.0       4. Hypertension management: elevated, but patient reports he has been out of Nifedipine for 2 weeks, advise low salt diet and home BP monitoring    coreg 12.5 mg BID , refilled-->nifedipine 30 mg QD    5. Metabolic Bone Disease/Secondary Hyperparathyroidism:stable.    Will monitor Vit D, PTH and CA / protocol   MG 1.5 (L) 07/26/2017     Lab Results   Component Value Date    .0 (H) 03/23/2016    CALCIUM 9.8 07/26/2017    PHOS 2.7 07/26/2017   Mg ox 800 mg TID    6. Electrolytes: Normal calcium. Bicarbonate is normal  Lab Results   Component Value Date     07/26/2017    K 5.0 07/26/2017     07/26/2017    CO2 23 07/26/2017   sodium bicarb 1300 mg BID     7. Anemia: stable. No need for intervention    Lab Results   Component Value Date    WBC 6.81 07/26/2017    HGB 13.7 (L) 07/26/2017    HCT 39.9 (L)  07/26/2017    MCV 81 (L) 07/26/2017     07/26/2017       8.  Cytopenias: no significant cytopenias will monitor as per our guidelines. Medicine list reviewed including potential causes of drug-induced cytopenias    9.Proteinuria: continue p/c ratio as per guidelines    7/26/2017  Prot/Creat Ratio, Ur 0.00 - 0.20  Unable to calculate       10. BK virus infection screening:   will continue to monitor/ guidelines  7/26/2017  BK Virus DNA, Blood Not detected  DETECTED    BK Virus DNA PCR, Quant, Blood <125 Copies/mL 194    Log BKV Copies/mL <2.10 Log (10) Copies/mL 2.29        11. Weight education: provided during the clinic visit   Body mass index is 28.56 kg/m².       12.Patient safety education regarding immunosuppression including prophylaxis posttransplant for CMV, PCP : Education provided about vaccination and prevention of infections       Follow-up:   Clinic: return to transplant clinic weekly for the first month after transplant; every 2 weeks during months 2-3; then at 6-, 9-, 12-, 18-, 24-, and 36- months post-transplant to reassess for complications from immunosuppression toxicity and monitor for rejection.  Annually thereafter.    Labs: since patient remains at high risk for rejection and drug-related complications that warrant close monitoring, labs will be ordered as follows: continue twice weekly CBC, renal panel, and drug level for first month; then same labs once weekly through 3rd month post-transplant.  Urine for UA and protein/creatinine ratio monthly.  Urine BK - PCR at 1-, 3-, 6-, 9-, 12-, 18-, 24-, and 36- months post-transplant.  Hepatic panel at 1-, 2-, 3-, 6-, 9-, 12-, 18-, 24-, and 36- months post-transplant.    Delphine Amaral NP       Education:   Material provided to the patient.  Patient reminded to call with any health changes since these can be early signs of significant complications.  Also, I advised the patient to be sure any new medications or changes of old medications are  discussed with either a pharmacist or physician knowledgeable with transplant to avoid rejection/drug toxicity related to significant drug interactions.    UNOS Patient Status  Functional Status: 80% - Normal activity with effort: some symptoms of disease  Physical Capacity: No Limitations

## 2017-08-09 ENCOUNTER — OFFICE VISIT (OUTPATIENT)
Dept: TRANSPLANT | Facility: CLINIC | Age: 57
End: 2017-08-09
Payer: MEDICARE

## 2017-08-09 VITALS
WEIGHT: 182.31 LBS | BODY MASS INDEX: 28.61 KG/M2 | HEIGHT: 67 IN | OXYGEN SATURATION: 99 % | SYSTOLIC BLOOD PRESSURE: 149 MMHG | HEART RATE: 76 BPM | RESPIRATION RATE: 16 BRPM | TEMPERATURE: 99 F | DIASTOLIC BLOOD PRESSURE: 103 MMHG

## 2017-08-09 DIAGNOSIS — E83.42 HYPOMAGNESEMIA: Chronic | ICD-10-CM

## 2017-08-09 DIAGNOSIS — I15.0 RENOVASCULAR HYPERTENSION: ICD-10-CM

## 2017-08-09 DIAGNOSIS — D63.1 ANEMIA OF CHRONIC RENAL FAILURE, STAGE 2 (MILD): Chronic | ICD-10-CM

## 2017-08-09 DIAGNOSIS — Z79.60 LONG-TERM USE OF IMMUNOSUPPRESSANT MEDICATION: ICD-10-CM

## 2017-08-09 DIAGNOSIS — Z94.0 S/P KIDNEY TRANSPLANT: Primary | ICD-10-CM

## 2017-08-09 DIAGNOSIS — B34.8 BK VIREMIA: ICD-10-CM

## 2017-08-09 DIAGNOSIS — Z29.89 PROPHYLACTIC IMMUNOTHERAPY: ICD-10-CM

## 2017-08-09 DIAGNOSIS — N18.2 CKD (CHRONIC KIDNEY DISEASE) STAGE 2, GFR 60-89 ML/MIN: ICD-10-CM

## 2017-08-09 DIAGNOSIS — Z94.0 S/P KIDNEY TRANSPLANT: ICD-10-CM

## 2017-08-09 DIAGNOSIS — N18.2 ANEMIA OF CHRONIC RENAL FAILURE, STAGE 2 (MILD): Chronic | ICD-10-CM

## 2017-08-09 PROCEDURE — 99214 OFFICE O/P EST MOD 30 MIN: CPT | Mod: PBBFAC | Performed by: NURSE PRACTITIONER

## 2017-08-09 PROCEDURE — 99214 OFFICE O/P EST MOD 30 MIN: CPT | Mod: S$PBB,,, | Performed by: NURSE PRACTITIONER

## 2017-08-09 PROCEDURE — 99999 PR PBB SHADOW E&M-EST. PATIENT-LVL IV: CPT | Mod: PBBFAC,,, | Performed by: NURSE PRACTITIONER

## 2017-08-09 PROCEDURE — 3077F SYST BP >= 140 MM HG: CPT | Mod: ,,, | Performed by: NURSE PRACTITIONER

## 2017-08-09 PROCEDURE — 3080F DIAST BP >= 90 MM HG: CPT | Mod: ,,, | Performed by: NURSE PRACTITIONER

## 2017-08-09 RX ORDER — TACROLIMUS 1 MG/1
CAPSULE ORAL
Qty: 150 CAPSULE | Refills: 11 | Status: SHIPPED | OUTPATIENT
Start: 2017-08-09 | End: 2018-02-09 | Stop reason: SDUPTHER

## 2017-08-09 RX ORDER — NIFEDIPINE 30 MG/1
30 TABLET, EXTENDED RELEASE ORAL DAILY
Qty: 30 TABLET | Refills: 3 | Status: SHIPPED | OUTPATIENT
Start: 2017-08-09 | End: 2020-08-25

## 2017-08-09 NOTE — LETTER
August 9, 2017        Dino Schmidt  [address]  [address]  [place] [address]  Phone: [phone]  Fax: [phone]             [facility]  [address]  [facility] [address]  Phone: [phone]   Patient: jC Downing   MR Number: [id number]   YOB: 1960   Date of Visit: 8/9/2017       Dear Dr. Dino Schmidt    Thank you for referring Cj Downing to me for evaluation. Attached you will find relevant portions of my assessment and plan of care.    If you have questions, please do not hesitate to call me. I look forward to following Cj Downing along with you.    Sincerely,    Delphine Amaral, NP    Enclosure    If you would like to receive this communication electronically, please contact [email or web address] or [phone] to request [facility] Link access.    [facility] Link is a tool which provides read-only access to select patient information with whom you have a relationship. Its easy to use and provides real time access to review your patients record including encounter summaries, notes, results, and demographic information.    If you feel you have received this communication in error or would no longer like to receive these types of communications, please e-mail [email or web address]

## 2017-08-10 RX ORDER — SODIUM BICARBONATE 650 MG/1
TABLET ORAL
Qty: 240 TABLET | Refills: 11 | OUTPATIENT
Start: 2017-08-10

## 2017-08-14 DIAGNOSIS — Z94.0 KIDNEY REPLACED BY TRANSPLANT: Primary | ICD-10-CM

## 2017-08-16 ENCOUNTER — LAB VISIT (OUTPATIENT)
Dept: LAB | Facility: HOSPITAL | Age: 57
End: 2017-08-16
Attending: INTERNAL MEDICINE
Payer: MEDICARE

## 2017-08-16 DIAGNOSIS — Z94.0 KIDNEY REPLACED BY TRANSPLANT: ICD-10-CM

## 2017-08-16 LAB
ALBUMIN SERPL BCP-MCNC: 3.6 G/DL
ANION GAP SERPL CALC-SCNC: 6 MMOL/L
BASOPHILS # BLD AUTO: 0.05 K/UL
BASOPHILS NFR BLD: 0.8 %
BUN SERPL-MCNC: 15 MG/DL
CALCIUM SERPL-MCNC: 10.1 MG/DL
CHLORIDE SERPL-SCNC: 110 MMOL/L
CO2 SERPL-SCNC: 25 MMOL/L
CREAT SERPL-MCNC: 1.2 MG/DL
DIFFERENTIAL METHOD: ABNORMAL
EOSINOPHIL # BLD AUTO: 0.2 K/UL
EOSINOPHIL NFR BLD: 2.3 %
ERYTHROCYTE [DISTWIDTH] IN BLOOD BY AUTOMATED COUNT: 15.8 %
EST. GFR  (AFRICAN AMERICAN): >60 ML/MIN/1.73 M^2
EST. GFR  (NON AFRICAN AMERICAN): >60 ML/MIN/1.73 M^2
GLUCOSE SERPL-MCNC: 99 MG/DL
HCT VFR BLD AUTO: 39.4 %
HGB BLD-MCNC: 13.7 G/DL
LYMPHOCYTES # BLD AUTO: 1.9 K/UL
LYMPHOCYTES NFR BLD: 28.7 %
MAGNESIUM SERPL-MCNC: 1.6 MG/DL
MCH RBC QN AUTO: 27.9 PG
MCHC RBC AUTO-ENTMCNC: 34.8 G/DL
MCV RBC AUTO: 80 FL
MONOCYTES # BLD AUTO: 0.6 K/UL
MONOCYTES NFR BLD: 9 %
NEUTROPHILS # BLD AUTO: 3.9 K/UL
NEUTROPHILS NFR BLD: 59 %
PHOSPHATE SERPL-MCNC: 2.6 MG/DL
PLATELET # BLD AUTO: 166 K/UL
PMV BLD AUTO: 10.8 FL
POTASSIUM SERPL-SCNC: 4.8 MMOL/L
RBC # BLD AUTO: 4.91 M/UL
SODIUM SERPL-SCNC: 141 MMOL/L
TACROLIMUS BLD-MCNC: 6.3 NG/ML
WBC # BLD AUTO: 6.63 K/UL

## 2017-08-16 PROCEDURE — 80069 RENAL FUNCTION PANEL: CPT

## 2017-08-16 PROCEDURE — 85025 COMPLETE CBC W/AUTO DIFF WBC: CPT

## 2017-08-16 PROCEDURE — 83735 ASSAY OF MAGNESIUM: CPT

## 2017-08-16 PROCEDURE — 87799 DETECT AGENT NOS DNA QUANT: CPT

## 2017-08-16 PROCEDURE — 80197 ASSAY OF TACROLIMUS: CPT

## 2017-08-16 PROCEDURE — 86352 CELL FUNCTION ASSAY W/STIM: CPT

## 2017-08-16 PROCEDURE — 36415 COLL VENOUS BLD VENIPUNCTURE: CPT

## 2017-08-18 LAB
BK VIRUS DNA PCR, QUANT, BLOOD: <125 COPIES/ML
BK VIRUS DNA, BLOOD: NOT DETECTED
IMMUNKNOW (STIMULATED): 335 NG/ML
LOG BKV COPIES/ML: <2.1 LOG (10) COPIES/ML

## 2017-08-30 ENCOUNTER — LAB VISIT (OUTPATIENT)
Dept: LAB | Facility: HOSPITAL | Age: 57
End: 2017-08-30
Attending: INTERNAL MEDICINE
Payer: MEDICARE

## 2017-08-30 ENCOUNTER — TELEPHONE (OUTPATIENT)
Dept: TRANSPLANT | Facility: CLINIC | Age: 57
End: 2017-08-30

## 2017-08-30 ENCOUNTER — TELEPHONE (OUTPATIENT)
Dept: ORTHOPEDICS | Facility: CLINIC | Age: 57
End: 2017-08-30

## 2017-08-30 DIAGNOSIS — Z94.0 KIDNEY REPLACED BY TRANSPLANT: ICD-10-CM

## 2017-08-30 LAB
ALBUMIN SERPL BCP-MCNC: 3.6 G/DL
ANION GAP SERPL CALC-SCNC: 8 MMOL/L
BASOPHILS # BLD AUTO: 0.04 K/UL
BASOPHILS NFR BLD: 0.7 %
BUN SERPL-MCNC: 14 MG/DL
CALCIUM SERPL-MCNC: 10.5 MG/DL
CHLORIDE SERPL-SCNC: 106 MMOL/L
CO2 SERPL-SCNC: 24 MMOL/L
CREAT SERPL-MCNC: 1.4 MG/DL
DIFFERENTIAL METHOD: ABNORMAL
EOSINOPHIL # BLD AUTO: 0.1 K/UL
EOSINOPHIL NFR BLD: 2.2 %
ERYTHROCYTE [DISTWIDTH] IN BLOOD BY AUTOMATED COUNT: 15.2 %
EST. GFR  (AFRICAN AMERICAN): >60 ML/MIN/1.73 M^2
EST. GFR  (NON AFRICAN AMERICAN): 55.4 ML/MIN/1.73 M^2
GLUCOSE SERPL-MCNC: 100 MG/DL
HCT VFR BLD AUTO: 40.8 %
HGB BLD-MCNC: 14.1 G/DL
LYMPHOCYTES # BLD AUTO: 1.7 K/UL
LYMPHOCYTES NFR BLD: 27.5 %
MAGNESIUM SERPL-MCNC: 1.7 MG/DL
MCH RBC QN AUTO: 28.4 PG
MCHC RBC AUTO-ENTMCNC: 34.6 G/DL
MCV RBC AUTO: 82 FL
MONOCYTES # BLD AUTO: 0.5 K/UL
MONOCYTES NFR BLD: 9 %
NEUTROPHILS # BLD AUTO: 3.6 K/UL
NEUTROPHILS NFR BLD: 60.4 %
PHOSPHATE SERPL-MCNC: 2.4 MG/DL
PLATELET # BLD AUTO: 171 K/UL
PMV BLD AUTO: 11.2 FL
POTASSIUM SERPL-SCNC: 5.1 MMOL/L
RBC # BLD AUTO: 4.97 M/UL
SODIUM SERPL-SCNC: 138 MMOL/L
TACROLIMUS BLD-MCNC: 6.8 NG/ML
WBC # BLD AUTO: 5.99 K/UL

## 2017-08-30 PROCEDURE — 80069 RENAL FUNCTION PANEL: CPT

## 2017-08-30 PROCEDURE — 85025 COMPLETE CBC W/AUTO DIFF WBC: CPT

## 2017-08-30 PROCEDURE — 87799 DETECT AGENT NOS DNA QUANT: CPT

## 2017-08-30 PROCEDURE — 80197 ASSAY OF TACROLIMUS: CPT

## 2017-08-30 PROCEDURE — 36415 COLL VENOUS BLD VENIPUNCTURE: CPT

## 2017-08-30 PROCEDURE — 83735 ASSAY OF MAGNESIUM: CPT

## 2017-08-30 NOTE — TELEPHONE ENCOUNTER
----- Message from Shilpa Rothman MD sent at 8/30/2017  2:18 PM CDT -----  Phos low -->  on higher phos diet

## 2017-08-30 NOTE — TELEPHONE ENCOUNTER
----- Message from Adarsh Zabala sent at 8/30/2017  9:55 AM CDT -----  Contact: Self/Home  Pt called attempting to reschedule appt day due to car trouble and harsh weather. Appt in question is (8/30) for 10:30 am. Pt was offered soonest appt- 9/5(Tuesday) at 11 am. Pt stated he is unable to make that appt. No sooner appts after for months out. Pt states he is able to do Wednesdays after 10, and Tuesdays after 12. Please return pt's call at [phone].

## 2017-08-31 LAB
BK VIRUS DNA PCR, QUANT, BLOOD: <125 COPIES/ML
BK VIRUS DNA, BLOOD: DETECTED
LOG BKV COPIES/ML: <2.1 LOG (10) COPIES/ML

## 2017-09-13 ENCOUNTER — LAB VISIT (OUTPATIENT)
Dept: LAB | Facility: HOSPITAL | Age: 57
End: 2017-09-13
Attending: INTERNAL MEDICINE
Payer: MEDICARE

## 2017-09-13 DIAGNOSIS — Z94.0 KIDNEY REPLACED BY TRANSPLANT: ICD-10-CM

## 2017-09-13 LAB
ALBUMIN SERPL BCP-MCNC: 3.6 G/DL
ANION GAP SERPL CALC-SCNC: 9 MMOL/L
BASOPHILS # BLD AUTO: 0.03 K/UL
BASOPHILS NFR BLD: 0.5 %
BUN SERPL-MCNC: 17 MG/DL
CALCIUM SERPL-MCNC: 10.5 MG/DL
CHLORIDE SERPL-SCNC: 109 MMOL/L
CO2 SERPL-SCNC: 26 MMOL/L
CREAT SERPL-MCNC: 1.5 MG/DL
DIFFERENTIAL METHOD: ABNORMAL
EOSINOPHIL # BLD AUTO: 0.1 K/UL
EOSINOPHIL NFR BLD: 2.2 %
ERYTHROCYTE [DISTWIDTH] IN BLOOD BY AUTOMATED COUNT: 15.4 %
EST. GFR  (AFRICAN AMERICAN): 58.9 ML/MIN/1.73 M^2
EST. GFR  (NON AFRICAN AMERICAN): 50.9 ML/MIN/1.73 M^2
GLUCOSE SERPL-MCNC: 110 MG/DL
HCT VFR BLD AUTO: 39.9 %
HGB BLD-MCNC: 13.8 G/DL
LYMPHOCYTES # BLD AUTO: 1.7 K/UL
LYMPHOCYTES NFR BLD: 31.3 %
MAGNESIUM SERPL-MCNC: 1.8 MG/DL
MCH RBC QN AUTO: 27.4 PG
MCHC RBC AUTO-ENTMCNC: 34.6 G/DL
MCV RBC AUTO: 79 FL
MONOCYTES # BLD AUTO: 0.4 K/UL
MONOCYTES NFR BLD: 7.3 %
NEUTROPHILS # BLD AUTO: 3.2 K/UL
NEUTROPHILS NFR BLD: 58.5 %
PHOSPHATE SERPL-MCNC: 3.2 MG/DL
PLATELET # BLD AUTO: 186 K/UL
PMV BLD AUTO: 10.8 FL
POTASSIUM SERPL-SCNC: 5.3 MMOL/L
RBC # BLD AUTO: 5.03 M/UL
SODIUM SERPL-SCNC: 144 MMOL/L
TACROLIMUS BLD-MCNC: 6.2 NG/ML
WBC # BLD AUTO: 5.46 K/UL

## 2017-09-13 PROCEDURE — 36415 COLL VENOUS BLD VENIPUNCTURE: CPT

## 2017-09-13 PROCEDURE — 83735 ASSAY OF MAGNESIUM: CPT

## 2017-09-13 PROCEDURE — 80197 ASSAY OF TACROLIMUS: CPT

## 2017-09-13 PROCEDURE — 85025 COMPLETE CBC W/AUTO DIFF WBC: CPT

## 2017-09-13 PROCEDURE — 80069 RENAL FUNCTION PANEL: CPT

## 2017-09-13 PROCEDURE — 87799 DETECT AGENT NOS DNA QUANT: CPT

## 2017-09-15 LAB
BK VIRUS DNA PCR, QUANT, BLOOD: <125 COPIES/ML
BK VIRUS DNA, BLOOD: NOT DETECTED
LOG BKV COPIES/ML: <2.1 LOG (10) COPIES/ML

## 2017-10-04 ENCOUNTER — TELEPHONE (OUTPATIENT)
Dept: TRANSPLANT | Facility: CLINIC | Age: 57
End: 2017-10-04

## 2017-10-05 ENCOUNTER — LAB VISIT (OUTPATIENT)
Dept: LAB | Facility: HOSPITAL | Age: 57
End: 2017-10-05
Attending: INTERNAL MEDICINE
Payer: MEDICARE

## 2017-10-05 DIAGNOSIS — Z94.0 KIDNEY REPLACED BY TRANSPLANT: ICD-10-CM

## 2017-10-05 LAB
ALBUMIN SERPL BCP-MCNC: 3.8 G/DL
ANION GAP SERPL CALC-SCNC: 7 MMOL/L
BASOPHILS # BLD AUTO: 0.05 K/UL
BASOPHILS NFR BLD: 0.9 %
BUN SERPL-MCNC: 13 MG/DL
CALCIUM SERPL-MCNC: 10.1 MG/DL
CHLORIDE SERPL-SCNC: 107 MMOL/L
CO2 SERPL-SCNC: 27 MMOL/L
CREAT SERPL-MCNC: 1.3 MG/DL
DIFFERENTIAL METHOD: ABNORMAL
EOSINOPHIL # BLD AUTO: 0.1 K/UL
EOSINOPHIL NFR BLD: 1.7 %
ERYTHROCYTE [DISTWIDTH] IN BLOOD BY AUTOMATED COUNT: 15.1 %
EST. GFR  (AFRICAN AMERICAN): >60 ML/MIN/1.73 M^2
EST. GFR  (NON AFRICAN AMERICAN): >60 ML/MIN/1.73 M^2
GLUCOSE SERPL-MCNC: 100 MG/DL
HCT VFR BLD AUTO: 41.5 %
HGB BLD-MCNC: 14 G/DL
LYMPHOCYTES # BLD AUTO: 1.6 K/UL
LYMPHOCYTES NFR BLD: 28.4 %
MAGNESIUM SERPL-MCNC: 1.7 MG/DL
MCH RBC QN AUTO: 28 PG
MCHC RBC AUTO-ENTMCNC: 33.7 G/DL
MCV RBC AUTO: 83 FL
MONOCYTES # BLD AUTO: 0.6 K/UL
MONOCYTES NFR BLD: 10.5 %
NEUTROPHILS # BLD AUTO: 3.3 K/UL
NEUTROPHILS NFR BLD: 58.2 %
PHOSPHATE SERPL-MCNC: 2.5 MG/DL
PLATELET # BLD AUTO: 159 K/UL
PMV BLD AUTO: 10.6 FL
POTASSIUM SERPL-SCNC: 4.5 MMOL/L
RBC # BLD AUTO: 5 M/UL
SODIUM SERPL-SCNC: 141 MMOL/L
TACROLIMUS BLD-MCNC: 6.8 NG/ML
WBC # BLD AUTO: 5.73 K/UL

## 2017-10-05 PROCEDURE — 83735 ASSAY OF MAGNESIUM: CPT

## 2017-10-05 PROCEDURE — 86352 CELL FUNCTION ASSAY W/STIM: CPT

## 2017-10-05 PROCEDURE — 80069 RENAL FUNCTION PANEL: CPT

## 2017-10-05 PROCEDURE — 36415 COLL VENOUS BLD VENIPUNCTURE: CPT

## 2017-10-05 PROCEDURE — 80197 ASSAY OF TACROLIMUS: CPT

## 2017-10-05 PROCEDURE — 85025 COMPLETE CBC W/AUTO DIFF WBC: CPT

## 2017-10-07 LAB — LEFLUNOMIDE METABOLITE: <0.01 MCG/ML

## 2017-10-09 LAB — IMMUNKNOW (STIMULATED): 243 NG/ML

## 2017-10-19 ENCOUNTER — LAB VISIT (OUTPATIENT)
Dept: LAB | Facility: HOSPITAL | Age: 57
End: 2017-10-19
Attending: INTERNAL MEDICINE
Payer: MEDICARE

## 2017-10-19 DIAGNOSIS — Z94.0 KIDNEY REPLACED BY TRANSPLANT: ICD-10-CM

## 2017-10-19 LAB
ALBUMIN SERPL BCP-MCNC: 3.9 G/DL
ANION GAP SERPL CALC-SCNC: 5 MMOL/L
BASOPHILS # BLD AUTO: 0.06 K/UL
BASOPHILS NFR BLD: 1 %
BUN SERPL-MCNC: 14 MG/DL
CALCIUM SERPL-MCNC: 10.5 MG/DL
CHLORIDE SERPL-SCNC: 104 MMOL/L
CO2 SERPL-SCNC: 28 MMOL/L
CREAT SERPL-MCNC: 1.5 MG/DL
DIFFERENTIAL METHOD: ABNORMAL
EOSINOPHIL # BLD AUTO: 0.2 K/UL
EOSINOPHIL NFR BLD: 2.8 %
ERYTHROCYTE [DISTWIDTH] IN BLOOD BY AUTOMATED COUNT: 14.6 %
EST. GFR  (AFRICAN AMERICAN): 58.9 ML/MIN/1.73 M^2
EST. GFR  (NON AFRICAN AMERICAN): 50.9 ML/MIN/1.73 M^2
GLUCOSE SERPL-MCNC: 113 MG/DL
HCT VFR BLD AUTO: 41.6 %
HGB BLD-MCNC: 14.2 G/DL
IMM GRANULOCYTES # BLD AUTO: 0.01 K/UL
IMM GRANULOCYTES NFR BLD AUTO: 0.2 %
LYMPHOCYTES # BLD AUTO: 1.9 K/UL
LYMPHOCYTES NFR BLD: 33 %
MAGNESIUM SERPL-MCNC: 2 MG/DL
MCH RBC QN AUTO: 27.5 PG
MCHC RBC AUTO-ENTMCNC: 34.1 G/DL
MCV RBC AUTO: 81 FL
MONOCYTES # BLD AUTO: 0.5 K/UL
MONOCYTES NFR BLD: 8 %
NEUTROPHILS # BLD AUTO: 3.2 K/UL
NEUTROPHILS NFR BLD: 55 %
NRBC BLD-RTO: 0 /100 WBC
PHOSPHATE SERPL-MCNC: 3 MG/DL
PLATELET # BLD AUTO: 179 K/UL
PMV BLD AUTO: 11.6 FL
POTASSIUM SERPL-SCNC: 5.6 MMOL/L
RBC # BLD AUTO: 5.16 M/UL
SODIUM SERPL-SCNC: 137 MMOL/L
TACROLIMUS BLD-MCNC: 6.6 NG/ML
WBC # BLD AUTO: 5.76 K/UL

## 2017-10-19 PROCEDURE — 36415 COLL VENOUS BLD VENIPUNCTURE: CPT

## 2017-10-19 PROCEDURE — 80069 RENAL FUNCTION PANEL: CPT

## 2017-10-19 PROCEDURE — 83735 ASSAY OF MAGNESIUM: CPT

## 2017-10-19 PROCEDURE — 87799 DETECT AGENT NOS DNA QUANT: CPT

## 2017-10-19 PROCEDURE — 80197 ASSAY OF TACROLIMUS: CPT

## 2017-10-19 PROCEDURE — 85025 COMPLETE CBC W/AUTO DIFF WBC: CPT

## 2017-10-19 NOTE — PROGRESS NOTES
Encourage lower potassium diet and more hydration. Sample is also slightly hemolyzed which could also account for elevated potassium

## 2017-11-03 ENCOUNTER — LAB VISIT (OUTPATIENT)
Dept: LAB | Facility: HOSPITAL | Age: 57
End: 2017-11-03
Attending: INTERNAL MEDICINE
Payer: MEDICARE

## 2017-11-03 DIAGNOSIS — Z94.0 KIDNEY REPLACED BY TRANSPLANT: ICD-10-CM

## 2017-11-03 LAB
ALBUMIN SERPL BCP-MCNC: 3.6 G/DL
ANION GAP SERPL CALC-SCNC: 7 MMOL/L
BASOPHILS # BLD AUTO: 0.06 K/UL
BASOPHILS NFR BLD: 1.2 %
BUN SERPL-MCNC: 17 MG/DL
CALCIUM SERPL-MCNC: 10.1 MG/DL
CHLORIDE SERPL-SCNC: 105 MMOL/L
CO2 SERPL-SCNC: 25 MMOL/L
CREAT SERPL-MCNC: 1.5 MG/DL
DIFFERENTIAL METHOD: ABNORMAL
EOSINOPHIL # BLD AUTO: 0.1 K/UL
EOSINOPHIL NFR BLD: 2.8 %
ERYTHROCYTE [DISTWIDTH] IN BLOOD BY AUTOMATED COUNT: 14.7 %
EST. GFR  (AFRICAN AMERICAN): 58.5 ML/MIN/1.73 M^2
EST. GFR  (NON AFRICAN AMERICAN): 50.6 ML/MIN/1.73 M^2
GLUCOSE SERPL-MCNC: 95 MG/DL
HCT VFR BLD AUTO: 40.5 %
HGB BLD-MCNC: 13.6 G/DL
IMM GRANULOCYTES # BLD AUTO: 0.01 K/UL
IMM GRANULOCYTES NFR BLD AUTO: 0.2 %
LYMPHOCYTES # BLD AUTO: 1.7 K/UL
LYMPHOCYTES NFR BLD: 33.4 %
MAGNESIUM SERPL-MCNC: 1.9 MG/DL
MCH RBC QN AUTO: 27.7 PG
MCHC RBC AUTO-ENTMCNC: 33.6 G/DL
MCV RBC AUTO: 83 FL
MONOCYTES # BLD AUTO: 0.5 K/UL
MONOCYTES NFR BLD: 9.8 %
NEUTROPHILS # BLD AUTO: 2.6 K/UL
NEUTROPHILS NFR BLD: 52.6 %
NRBC BLD-RTO: 0 /100 WBC
PHOSPHATE SERPL-MCNC: 2.4 MG/DL
PLATELET # BLD AUTO: 157 K/UL
PMV BLD AUTO: 11.2 FL
POTASSIUM SERPL-SCNC: 5 MMOL/L
RBC # BLD AUTO: 4.91 M/UL
SODIUM SERPL-SCNC: 137 MMOL/L
TACROLIMUS BLD-MCNC: 8.8 NG/ML
WBC # BLD AUTO: 5 K/UL

## 2017-11-03 PROCEDURE — 86352 CELL FUNCTION ASSAY W/STIM: CPT

## 2017-11-03 PROCEDURE — 80069 RENAL FUNCTION PANEL: CPT

## 2017-11-03 PROCEDURE — 80197 ASSAY OF TACROLIMUS: CPT

## 2017-11-03 PROCEDURE — 83735 ASSAY OF MAGNESIUM: CPT

## 2017-11-03 PROCEDURE — 87799 DETECT AGENT NOS DNA QUANT: CPT

## 2017-11-03 PROCEDURE — 85025 COMPLETE CBC W/AUTO DIFF WBC: CPT

## 2017-11-03 NOTE — PROGRESS NOTES
Prograf higher than goal but previously stable on this dose --> arrange for repeat level. Phos low -  on higher phos diet

## 2017-11-07 LAB — IMMUNKNOW (STIMULATED): 333 NG/ML

## 2017-11-16 ENCOUNTER — LAB VISIT (OUTPATIENT)
Dept: LAB | Facility: HOSPITAL | Age: 57
End: 2017-11-16
Attending: INTERNAL MEDICINE
Payer: MEDICARE

## 2017-11-16 ENCOUNTER — TELEPHONE (OUTPATIENT)
Dept: TRANSPLANT | Facility: CLINIC | Age: 57
End: 2017-11-16

## 2017-11-16 DIAGNOSIS — Z94.0 KIDNEY REPLACED BY TRANSPLANT: ICD-10-CM

## 2017-11-16 LAB
ALBUMIN SERPL BCP-MCNC: 3.6 G/DL
ANION GAP SERPL CALC-SCNC: 7 MMOL/L
BASOPHILS # BLD AUTO: 0.07 K/UL
BASOPHILS NFR BLD: 1.1 %
BUN SERPL-MCNC: 15 MG/DL
CALCIUM SERPL-MCNC: 10 MG/DL
CHLORIDE SERPL-SCNC: 106 MMOL/L
CO2 SERPL-SCNC: 26 MMOL/L
CREAT SERPL-MCNC: 1.6 MG/DL
DIFFERENTIAL METHOD: ABNORMAL
EOSINOPHIL # BLD AUTO: 0.1 K/UL
EOSINOPHIL NFR BLD: 1.7 %
ERYTHROCYTE [DISTWIDTH] IN BLOOD BY AUTOMATED COUNT: 14.6 %
EST. GFR  (AFRICAN AMERICAN): 54.1 ML/MIN/1.73 M^2
EST. GFR  (NON AFRICAN AMERICAN): 46.8 ML/MIN/1.73 M^2
GLUCOSE SERPL-MCNC: 100 MG/DL
HCT VFR BLD AUTO: 39.9 %
HGB BLD-MCNC: 13.5 G/DL
IMM GRANULOCYTES # BLD AUTO: 0.01 K/UL
IMM GRANULOCYTES NFR BLD AUTO: 0.2 %
LYMPHOCYTES # BLD AUTO: 1.9 K/UL
LYMPHOCYTES NFR BLD: 29.8 %
MAGNESIUM SERPL-MCNC: 1.9 MG/DL
MCH RBC QN AUTO: 27.4 PG
MCHC RBC AUTO-ENTMCNC: 33.8 G/DL
MCV RBC AUTO: 81 FL
MONOCYTES # BLD AUTO: 0.7 K/UL
MONOCYTES NFR BLD: 10.2 %
NEUTROPHILS # BLD AUTO: 3.6 K/UL
NEUTROPHILS NFR BLD: 57 %
NRBC BLD-RTO: 0 /100 WBC
PHOSPHATE SERPL-MCNC: 2.9 MG/DL
PLATELET # BLD AUTO: 152 K/UL
PMV BLD AUTO: 11 FL
POTASSIUM SERPL-SCNC: 5.4 MMOL/L
RBC # BLD AUTO: 4.92 M/UL
SODIUM SERPL-SCNC: 139 MMOL/L
TACROLIMUS BLD-MCNC: 5 NG/ML
WBC # BLD AUTO: 6.38 K/UL

## 2017-11-16 PROCEDURE — 36415 COLL VENOUS BLD VENIPUNCTURE: CPT

## 2017-11-16 PROCEDURE — 85025 COMPLETE CBC W/AUTO DIFF WBC: CPT

## 2017-11-16 PROCEDURE — 87799 DETECT AGENT NOS DNA QUANT: CPT

## 2017-11-16 PROCEDURE — 80069 RENAL FUNCTION PANEL: CPT

## 2017-11-16 PROCEDURE — 83735 ASSAY OF MAGNESIUM: CPT

## 2017-11-16 PROCEDURE — 80197 ASSAY OF TACROLIMUS: CPT

## 2017-11-16 NOTE — TELEPHONE ENCOUNTER
----- Message from Shilpa Rothman MD sent at 11/16/2017  4:23 PM CST -----  Encourage lower potassium diet

## 2017-11-17 RX ORDER — MYCOPHENOLATE MOFETIL 250 MG/1
250 CAPSULE ORAL 2 TIMES DAILY
Qty: 60 CAPSULE | Refills: 11 | Status: SHIPPED | OUTPATIENT
Start: 2017-11-17 | End: 2018-01-31 | Stop reason: SDUPTHER

## 2017-11-17 NOTE — TELEPHONE ENCOUNTER
----- Message from Shilpa Rothman MD sent at 11/17/2017  3:50 PM CST -----  BK PCR remains low --> would restart  mg BID and continue to monitor BK PCR

## 2017-12-01 ENCOUNTER — LAB VISIT (OUTPATIENT)
Dept: LAB | Facility: HOSPITAL | Age: 57
End: 2017-12-01
Attending: INTERNAL MEDICINE
Payer: MEDICARE

## 2017-12-01 DIAGNOSIS — Z94.0 KIDNEY REPLACED BY TRANSPLANT: ICD-10-CM

## 2017-12-01 LAB
ALBUMIN SERPL BCP-MCNC: 3.7 G/DL
ANION GAP SERPL CALC-SCNC: 5 MMOL/L
BASOPHILS # BLD AUTO: 0.04 K/UL
BASOPHILS NFR BLD: 0.6 %
BUN SERPL-MCNC: 15 MG/DL
CALCIUM SERPL-MCNC: 9.8 MG/DL
CHLORIDE SERPL-SCNC: 107 MMOL/L
CO2 SERPL-SCNC: 26 MMOL/L
CREAT SERPL-MCNC: 1.4 MG/DL
DIFFERENTIAL METHOD: ABNORMAL
EOSINOPHIL # BLD AUTO: 0.2 K/UL
EOSINOPHIL NFR BLD: 2.3 %
ERYTHROCYTE [DISTWIDTH] IN BLOOD BY AUTOMATED COUNT: 14.7 %
EST. GFR  (AFRICAN AMERICAN): >60 ML/MIN/1.73 M^2
EST. GFR  (NON AFRICAN AMERICAN): 55 ML/MIN/1.73 M^2
GLUCOSE SERPL-MCNC: 99 MG/DL
HCT VFR BLD AUTO: 40.2 %
HGB BLD-MCNC: 13.6 G/DL
IMM GRANULOCYTES # BLD AUTO: 0.02 K/UL
IMM GRANULOCYTES NFR BLD AUTO: 0.3 %
LYMPHOCYTES # BLD AUTO: 1.9 K/UL
LYMPHOCYTES NFR BLD: 29.4 %
MAGNESIUM SERPL-MCNC: 2.2 MG/DL
MCH RBC QN AUTO: 27.5 PG
MCHC RBC AUTO-ENTMCNC: 33.8 G/DL
MCV RBC AUTO: 81 FL
MONOCYTES # BLD AUTO: 0.7 K/UL
MONOCYTES NFR BLD: 10.4 %
NEUTROPHILS # BLD AUTO: 3.7 K/UL
NEUTROPHILS NFR BLD: 57 %
NRBC BLD-RTO: 0 /100 WBC
PHOSPHATE SERPL-MCNC: 2.7 MG/DL
PLATELET # BLD AUTO: 196 K/UL
PMV BLD AUTO: 11.2 FL
POTASSIUM SERPL-SCNC: 5 MMOL/L
RBC # BLD AUTO: 4.95 M/UL
SODIUM SERPL-SCNC: 138 MMOL/L
TACROLIMUS BLD-MCNC: 4.8 NG/ML
WBC # BLD AUTO: 6.42 K/UL

## 2017-12-01 PROCEDURE — 87799 DETECT AGENT NOS DNA QUANT: CPT

## 2017-12-01 PROCEDURE — 80069 RENAL FUNCTION PANEL: CPT

## 2017-12-01 PROCEDURE — 86352 CELL FUNCTION ASSAY W/STIM: CPT

## 2017-12-01 PROCEDURE — 80197 ASSAY OF TACROLIMUS: CPT

## 2017-12-01 PROCEDURE — 83735 ASSAY OF MAGNESIUM: CPT

## 2017-12-01 PROCEDURE — 85025 COMPLETE CBC W/AUTO DIFF WBC: CPT

## 2017-12-04 LAB
BK VIRUS DNA PCR, QUANT, BLOOD: <125 COPIES/ML
BK VIRUS DNA, BLOOD: NOT DETECTED
IMMUNKNOW (STIMULATED): 153 NG/ML
LOG BKV COPIES/ML: <2.1 LOG (10) COPIES/ML

## 2018-01-18 RX ORDER — SODIUM POLYSTYRENE SULFONATE 15 G/60ML
SUSPENSION ORAL; RECTAL
Qty: 3600 ML | Refills: 11 | OUTPATIENT
Start: 2018-01-18

## 2018-01-23 ENCOUNTER — TELEPHONE (OUTPATIENT)
Dept: TRANSPLANT | Facility: CLINIC | Age: 58
End: 2018-01-23

## 2018-01-23 NOTE — TELEPHONE ENCOUNTER
----- Message from Kevon Melendrez sent at 1/23/2018 11:12 AM CST -----  Contact: Pt   Would like Essence to call him regarding his labs      Call

## 2018-01-25 ENCOUNTER — LAB VISIT (OUTPATIENT)
Dept: LAB | Facility: HOSPITAL | Age: 58
End: 2018-01-25
Attending: INTERNAL MEDICINE
Payer: MEDICARE

## 2018-01-25 DIAGNOSIS — Z94.0 KIDNEY REPLACED BY TRANSPLANT: ICD-10-CM

## 2018-01-25 LAB
ALBUMIN SERPL BCP-MCNC: 3.8 G/DL
ALBUMIN SERPL BCP-MCNC: 3.8 G/DL
ALP SERPL-CCNC: 51 U/L
ALT SERPL W/O P-5'-P-CCNC: 14 U/L
ANION GAP SERPL CALC-SCNC: 6 MMOL/L
AST SERPL-CCNC: 21 U/L
BASOPHILS # BLD AUTO: 0.05 K/UL
BASOPHILS NFR BLD: 0.9 %
BILIRUB DIRECT SERPL-MCNC: 0.1 MG/DL
BILIRUB SERPL-MCNC: 0.4 MG/DL
BUN SERPL-MCNC: 22 MG/DL
CALCIUM SERPL-MCNC: 10.2 MG/DL
CHLORIDE SERPL-SCNC: 108 MMOL/L
CO2 SERPL-SCNC: 28 MMOL/L
CREAT SERPL-MCNC: 1.3 MG/DL
DIFFERENTIAL METHOD: ABNORMAL
EOSINOPHIL # BLD AUTO: 0.1 K/UL
EOSINOPHIL NFR BLD: 2.2 %
ERYTHROCYTE [DISTWIDTH] IN BLOOD BY AUTOMATED COUNT: 15 %
EST. GFR  (AFRICAN AMERICAN): >60 ML/MIN/1.73 M^2
EST. GFR  (NON AFRICAN AMERICAN): >60 ML/MIN/1.73 M^2
GLUCOSE SERPL-MCNC: 100 MG/DL
HCT VFR BLD AUTO: 40.3 %
HGB BLD-MCNC: 13.4 G/DL
IMM GRANULOCYTES # BLD AUTO: 0.01 K/UL
IMM GRANULOCYTES NFR BLD AUTO: 0.2 %
LYMPHOCYTES # BLD AUTO: 1.7 K/UL
LYMPHOCYTES NFR BLD: 31.4 %
MAGNESIUM SERPL-MCNC: 2.1 MG/DL
MCH RBC QN AUTO: 27.3 PG
MCHC RBC AUTO-ENTMCNC: 33.3 G/DL
MCV RBC AUTO: 82 FL
MONOCYTES # BLD AUTO: 0.6 K/UL
MONOCYTES NFR BLD: 10.6 %
NEUTROPHILS # BLD AUTO: 3 K/UL
NEUTROPHILS NFR BLD: 54.7 %
NRBC BLD-RTO: 0 /100 WBC
PHOSPHATE SERPL-MCNC: 2.8 MG/DL
PLATELET # BLD AUTO: 169 K/UL
PMV BLD AUTO: 11.3 FL
POTASSIUM SERPL-SCNC: 5.2 MMOL/L
PROT SERPL-MCNC: 8 G/DL
RBC # BLD AUTO: 4.9 M/UL
SODIUM SERPL-SCNC: 142 MMOL/L
TACROLIMUS BLD-MCNC: 6.4 NG/ML
WBC # BLD AUTO: 5.45 K/UL

## 2018-01-25 PROCEDURE — 84075 ASSAY ALKALINE PHOSPHATASE: CPT

## 2018-01-25 PROCEDURE — 80069 RENAL FUNCTION PANEL: CPT

## 2018-01-25 PROCEDURE — 80197 ASSAY OF TACROLIMUS: CPT

## 2018-01-25 PROCEDURE — 85025 COMPLETE CBC W/AUTO DIFF WBC: CPT

## 2018-01-25 PROCEDURE — 86352 CELL FUNCTION ASSAY W/STIM: CPT

## 2018-01-25 PROCEDURE — 87799 DETECT AGENT NOS DNA QUANT: CPT

## 2018-01-25 PROCEDURE — 83735 ASSAY OF MAGNESIUM: CPT

## 2018-01-25 PROCEDURE — 36415 COLL VENOUS BLD VENIPUNCTURE: CPT

## 2018-01-25 RX ORDER — SODIUM POLYSTYRENE SULFONATE 15 G/60ML
SUSPENSION ORAL; RECTAL
Qty: 3600 ML | Refills: 11 | OUTPATIENT
Start: 2018-01-25

## 2018-01-29 LAB — IMMUNKNOW (STIMULATED): 263 NG/ML

## 2018-01-31 ENCOUNTER — OFFICE VISIT (OUTPATIENT)
Dept: TRANSPLANT | Facility: CLINIC | Age: 58
End: 2018-01-31
Payer: MEDICARE

## 2018-01-31 VITALS
HEART RATE: 77 BPM | HEIGHT: 67 IN | SYSTOLIC BLOOD PRESSURE: 122 MMHG | WEIGHT: 189.13 LBS | TEMPERATURE: 98 F | BODY MASS INDEX: 29.69 KG/M2 | OXYGEN SATURATION: 100 % | RESPIRATION RATE: 18 BRPM | DIASTOLIC BLOOD PRESSURE: 86 MMHG

## 2018-01-31 DIAGNOSIS — N18.2 CKD (CHRONIC KIDNEY DISEASE) STAGE 2, GFR 60-89 ML/MIN: Primary | ICD-10-CM

## 2018-01-31 DIAGNOSIS — Z79.60 LONG-TERM USE OF IMMUNOSUPPRESSANT MEDICATION: ICD-10-CM

## 2018-01-31 DIAGNOSIS — Z79.899 IMMUNOSUPPRESSIVE MANAGEMENT ENCOUNTER FOLLOWING KIDNEY TRANSPLANT: ICD-10-CM

## 2018-01-31 DIAGNOSIS — E83.39 HYPOPHOSPHATEMIA: Chronic | ICD-10-CM

## 2018-01-31 DIAGNOSIS — E55.9 VITAMIN D DEFICIENCY DISEASE: ICD-10-CM

## 2018-01-31 DIAGNOSIS — I10 ESSENTIAL HYPERTENSION: ICD-10-CM

## 2018-01-31 DIAGNOSIS — Z94.0 S/P KIDNEY TRANSPLANT: ICD-10-CM

## 2018-01-31 DIAGNOSIS — B34.8 BK VIREMIA: ICD-10-CM

## 2018-01-31 DIAGNOSIS — N25.81 SECONDARY HYPERPARATHYROIDISM, RENAL: ICD-10-CM

## 2018-01-31 DIAGNOSIS — E83.42 HYPOMAGNESEMIA: Chronic | ICD-10-CM

## 2018-01-31 DIAGNOSIS — Z94.0 KIDNEY REPLACED BY TRANSPLANT: Primary | ICD-10-CM

## 2018-01-31 DIAGNOSIS — Z29.89 PROPHYLACTIC IMMUNOTHERAPY: ICD-10-CM

## 2018-01-31 DIAGNOSIS — E87.20 ACIDOSIS: ICD-10-CM

## 2018-01-31 DIAGNOSIS — Z94.0 IMMUNOSUPPRESSIVE MANAGEMENT ENCOUNTER FOLLOWING KIDNEY TRANSPLANT: ICD-10-CM

## 2018-01-31 PROCEDURE — 99215 OFFICE O/P EST HI 40 MIN: CPT | Mod: S$PBB,,, | Performed by: INTERNAL MEDICINE

## 2018-01-31 PROCEDURE — 99999 PR PBB SHADOW E&M-EST. PATIENT-LVL III: CPT | Mod: PBBFAC,,, | Performed by: INTERNAL MEDICINE

## 2018-01-31 PROCEDURE — 99213 OFFICE O/P EST LOW 20 MIN: CPT | Mod: PBBFAC | Performed by: INTERNAL MEDICINE

## 2018-01-31 RX ORDER — LANOLIN ALCOHOL/MO/W.PET/CERES
400 CREAM (GRAM) TOPICAL 2 TIMES DAILY
Qty: 180 TABLET | Refills: 11 | Status: SHIPPED | OUTPATIENT
Start: 2018-01-31 | End: 2020-08-25

## 2018-01-31 RX ORDER — MYCOPHENOLATE MOFETIL 250 MG/1
500 CAPSULE ORAL 2 TIMES DAILY
Qty: 120 CAPSULE | Refills: 11 | Status: SHIPPED | OUTPATIENT
Start: 2018-01-31 | End: 2019-02-04 | Stop reason: SDUPTHER

## 2018-01-31 NOTE — PATIENT INSTRUCTIONS
1. Re-establish with a general nephrologist  2. Decrease MagOx to 400 mg (1 pill) twice a day  3. Watch the potassium in your diet   4. Increase Cellcept to 500 mg (2 pills) twice a day   5. Get labs in 2 weeks

## 2018-01-31 NOTE — PROGRESS NOTES
Kidney Post-Transplant Assessment    Referring Physician: Dino Schmidt  Current Nephrologist: Dino Schmidt    ORGAN: RIGHT KIDNEY  Donor Type:  - brain death  PHS Increased Risk: yes  Cold Ischemia: 355 mins  Induction Medications: campath - alemtuzumab (anti-cd52)    Subjective:     CC:  Reassessment of renal allograft function and management of chronic immunosuppression.    Kidney History:  Mr. Downing is a 57 y.o. year old Black or  male with history of ESRD presumed secondary to HTN who was on dialysis since 3/7/2007 until receiving PHS increased risk DBD DDRT (Campath induction, CMV D+/R+) on 16. Post-op course complicated by donor positive blood culture for which patient was treated for 2 weeks with Ancef until 16. He has CKD stage 2 - GFR 60-89 and his baseline creatinine is between 1.2 to 1.6. He takes mycophenolate mofetil and tacrolimus for maintenance immunosuppression.     Interval History: Patient last seen in transplant clinic on 17. Since last visit he denies any hospitalizations or ER visits. He has gained 7 lbs since last visit. He doesn't check BPs at home. 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 He lives in a duplex and has stairs that he walks frequently.     Review of Systems  Constitutional: Negative for fever, appetite change and fatigue.   HENT: Negative for hearing loss, sore throat and mouth sores.   Eyes: Negative for photophobia, pain and visual disturbance.   Respiratory: Negative for cough, chest tightness, shortness of breath and wheezing.   Cardiovascular: Negative for chest pain, palpitations and leg swelling.   Gastrointestinal: Negative for nausea, vomiting, abdominal pain, diarrhea, constipation, blood in stool and abdominal distention.   Genitourinary: Negative for dysuria, urgency, frequency, hematuria, decreased urine volume, difficulty urinating  Musculoskeletal: Negative for back pain, joint swelling,  "arthralgias and gait problem.   Skin: Negative for pallor, rash and wound.   Neurological: Negative for dizziness, tremors, syncope, weakness, light-headedness and headaches.   Hematological: Negative for adenopathy. Does not bruise/bleed easily.   Psychiatric/Behavioral: Negative for confusion, sleep disturbance and dysphoric mood. The patient is not nervous/anxious.     Medications:   Current Outpatient Prescriptions   Medication Sig Dispense Refill    carvedilol (COREG) 12.5 MG tablet TAKE 1 TABLET BY MOUTH TWICE DAILY WITH MEALS. 60 tablet 11    fluorometholone 0.1% (FML) 0.1 % DrpS Place 1 drop into the left eye 2 (two) times daily.       magnesium oxide (MAG-OX) 400 mg tablet Take 2 tablets (800 mg total) by mouth 3 (three) times daily. 180 tablet 11    multivitamin (THERAGRAN) tablet Take 1 tablet by mouth once daily.      mycophenolate (CELLCEPT) 250 mg Cap Take 1 capsule (250 mg total) by mouth 2 (two) times daily. 60 capsule 11    nifedipine (PROCARDIA-XL) 30 MG (OSM) 24 hr tablet Take 1 tablet (30 mg total) by mouth once daily. 30 tablet 3    sodium bicarbonate 650 MG tablet Take 2 tablets (1,300 mg total) by mouth 2 (two) times daily. 240 tablet 11    sodium polystyrene (KAYEXALATE) 15 gram/60 mL Susp Take 120 mLs (30 g total) by mouth once daily. (Patient taking differently: Take 30 g by mouth once daily. States he takes BID) 3600 mL 11    tacrolimus (PROGRAF) 1 MG Cap TAKE 3 MG AM AND 2 MG PM  Via oral Z94.0 150 capsule 11     No current facility-administered medications for this visit.      *not taking sodium bicarb for a while   *taking MagOx 800 mg BID not TID as listed  *on last bottle of kayexalate       Objective:     Blood pressure 122/86, pulse 77, temperature 98.2 °F (36.8 °C), temperature source Oral, resp. rate 18, height 5' 7" (1.702 m), weight 85.8 kg (189 lb 2.5 oz), SpO2 100 %.body mass index is 29.63 kg/m².    Physical Exam  General: No acute distress, well groomed, alert and " oriented x 3  HEENT: Normocephalic, atraumatic, EOM's intact bilaterally, external inspection of ears and nose normal, moist mucous membranes, no oral ulcerations/lesions  Neck: Supple, symmetrical, trachea midline, no thyromegaly, no JVD  Respiratory: Clear to auscultation bilaterally, respirations unlabored, no rales/rhonchi/wheezing  Cardiovacular: Regular rate and rhythm, S1, S2 normal, no murmurs, rubs or gallops  Gastrointestinal: Soft, non-tender, bowel sounds normal, no hepatosplenomegaly  Renal allograft exam: No tenderness, no bruits, normal exam  Musculoskeletal: No knee or ankle joint tenderness or swelling.   Extremities: No clubbing or cyanosis of bilateral upper extremities; no lower extremity edema bilaterally, radial pulses 2+ bilaterally, symmetric  Skin: warm and dry; no rash on exposed skin  Neurologic: CN grossly intact  Access: LUE AVF +thrill/bruit    Labs:  Lab Results   Component Value Date    WBC 5.45 01/25/2018    HGB 13.4 (L) 01/25/2018    HCT 40.3 01/25/2018     01/25/2018    K 5.2 (H) 01/25/2018     01/25/2018    CO2 28 01/25/2018    BUN 22 (H) 01/25/2018    CREATININE 1.3 01/25/2018    EGFRNONAA >60.0 01/25/2018    CALCIUM 10.2 01/25/2018    PHOS 2.8 01/25/2018    MG 2.1 01/25/2018    ALBUMIN 3.8 01/25/2018    ALBUMIN 3.8 01/25/2018    AST 21 01/25/2018    ALT 14 01/25/2018    UTPCR Unable to calculate 01/25/2018    .0 (H) 03/23/2016    TACROLIMUS 6.4 01/25/2018       No results found for: EXTANC, EXTWBC, EXTSEGS, EXTPLATELETS, EXTHEMOGLOBI, EXTHEMATOCRI, EXTCREATININ, EXTSODIUM, EXTPOTASSIUM, EXTBUN, EXTCO2, EXTCALCIUM, EXTPHOSPHORU, EXTGLUCOSE, EXTALBUMIN, EXTAST, EXTALT, EXTBILITOTAL, EXTLIPASE, EXTAMYLASE    No results found for: EXTCYCLOSLVL, EXTSIROLIMUS, EXTTACROLVL, EXTPROTCRE, EXTPTHINTACT, EXTPROTEINUA, EXTWBCUA, EXTRBCUA    Labs were reviewed with the patient.    Assessment/Plan:     1. CKD (chronic kidney disease) stage 2, GFR 60-89 ml/min    2. S/P  kidney transplant 1/28/2016    3. BK viremia    4. Hypomagnesemia    5. Hypophosphatemia    6. Essential hypertension    7. Prophylactic immunotherapy    8. Secondary hyperparathyroidism, renal    9. Vitamin D deficiency disease    10. Long-term use of immunosuppressant medication    11. Acidosis    12. Immunosuppressive management encounter following kidney transplant        Mr. Downing is a 57 y.o. male with:       # History of ESRD presumed secondary to HTN who was on dialysis since 3/7/2007 until receiving PHS increased risk DBD DDRT (Campath induction, CMV D+/R+) on 1/28/16: baseline Cr 1.2  to 1.6  - last Cr within baseline at 1.3 from 1/25/18  - UPC negative from 1/25/18  - advised him to re-establish with his general nephrologist    # Immunosuppression:   - continue Prograf 3/2, last FK-506 level at goal at 6.1 from 1/25/18  - increase MMF from 250 mg BID to 500 mg BID --> will need to get labs in 2 weeks to ensure WBC stable and check BK PCR at that time   - continue to monitor for toxicities from immunosuppressive medications    # BK Viremia: first detected on 5/17/16; peak PCR of 17,198 from 6/16/16; but has been negative for <250 copies/mL since 10/19/16  - last BK serum PCR negative from 1/25/18  - check BK PCR in 2 weeks given increasing MMF dose    # Infectious Surveillance:   - last CMV PCR negative from 9/14/16    # HTN: BPs well controlled   - continue Coreg 12.5 mg BID and nifedipine 30 mg daily   - continue with home blood pressure monitoring  - low salt and healthy life discussed with the patient    # Metabolic Bone Disease/Secondary Hyperparathyroidism: last calcium/phos normal  - will monitor PTH, calcium, and phosphorus as per our center protocol    # Hypomagnesemia: last Mg level was 2.1 from 1/25/18  - decrease MagOx from 800 mg BID to 400 mg BID    # Metabolic acidosis: bicarb on higher end at 28 but patient states that he stopped taking sodium bicarbonate a while ago   - discontinued  sodium bicarbonate from med list     # Anemia of chronic disease: Hb stable at 13.4  - will continue monitoring as per our center guidelines. No indication for acute intervention today    # Hyperkalemia: potassium slightly elevated at 5.2  - continue kayexalate 30 gm daily   - further management per general nephrologist  - counseled him on low potassium diet     Follow-up:   Clinic: return to transplant clinic weekly for the first month after transplant; every 2 weeks during months 2-3; then at 6-, 9-, 12-, 18-, 24-, and 36- months post-transplant to reassess for complications from immunosuppression toxicity and monitor for rejection.  Annually thereafter.    Labs: since patient remains at high risk for rejection and drug-related complications that warrant close monitoring, labs will be ordered as follows: continue twice weekly CBC, renal panel, and drug level for first month; then same labs once weekly through 3rd month post-transplant.  Urine for UA and protein/creatinine ratio monthly.  Serum BK - PCR at 1-, 3-, 6-, 9-, 12-, 18-, 24-, and 36- months post-transplant.  Hepatic panel at 1-, 2-, 3-, 6-, 9-, 12-, 18-, 24-, and 36- months post-transplant.    Shilpa Rothman MD       Education:   Material provided to the patient.  Patient reminded to call with any health changes since these can be early signs of significant complications.  Also, I advised the patient to be sure any new medications or changes of old medications are discussed with either a pharmacist or physician knowledgeable with transplant to avoid rejection/drug toxicity related to significant drug interactions.    UNOS Patient Status  Functional Status: 100% - Normal, no complaints, no evidence of disease  Physical Capacity: No Limitations

## 2018-01-31 NOTE — LETTER
January 31, 2018                     [facility] [facility]  [address]  [facility] [address]  Phone: [phone]   Patient: Cj Downing   MR Number: [id number]   YOB: 1960   Date of Visit: 1/31/2018       Dear      Thank you for referring Cj Downing to me for evaluation. Attached you will find relevant portions of my assessment and plan of care.    If you have questions, please do not hesitate to call me. I look forward to following Cj Downing along with you.    Sincerely,    Shilpa Rothman MD    Enclosure    If you would like to receive this communication electronically, please contact [email or web address] or [phone] to request [employer] access.    [employer] is a tool which provides read-only access to select patient information with whom you have a relationship. Its easy to use and provides real time access to review your patients record including encounter summaries, notes, results, and demographic information.    If you feel you have received this communication in error or would no longer like to receive these types of communications, please e-mail [email or web address]

## 2018-02-09 DIAGNOSIS — Z79.60 LONG-TERM USE OF IMMUNOSUPPRESSANT MEDICATION: ICD-10-CM

## 2018-02-09 DIAGNOSIS — Z94.0 S/P KIDNEY TRANSPLANT: ICD-10-CM

## 2018-02-09 RX ORDER — TACROLIMUS 1 MG/1
CAPSULE ORAL
Qty: 150 CAPSULE | Refills: 5 | Status: SHIPPED | OUTPATIENT
Start: 2018-02-09 | End: 2018-06-13 | Stop reason: DRUGHIGH

## 2018-02-09 NOTE — TELEPHONE ENCOUNTER
----- Message from Patricia Benton sent at 2/9/2018 11:37 AM CST -----  Contact: pt  Pt calling re his tacrolimus (PROGRAF) 1 MG Cap rx. Pt says he is almost out of medication and has left several msgs for Moe to call him back.    Pt contact number [phone]

## 2018-02-09 NOTE — TELEPHONE ENCOUNTER
I tried to return pt call- unable to reach pt on number provided.  I called the alternate number.  I let the woman know that we will send the prescription to [facility] (her request); this lady was not aware of pt assistance or what exactly Mr Downing needed.  I let her know that he can call with further questions.

## 2018-03-01 ENCOUNTER — TELEPHONE (OUTPATIENT)
Dept: TRANSPLANT | Facility: CLINIC | Age: 58
End: 2018-03-01

## 2018-03-02 ENCOUNTER — LAB VISIT (OUTPATIENT)
Dept: LAB | Facility: HOSPITAL | Age: 58
End: 2018-03-02
Attending: INTERNAL MEDICINE
Payer: MEDICARE

## 2018-03-02 DIAGNOSIS — Z94.0 KIDNEY REPLACED BY TRANSPLANT: ICD-10-CM

## 2018-03-02 LAB
ALBUMIN SERPL BCP-MCNC: 3.6 G/DL
ANION GAP SERPL CALC-SCNC: 7 MMOL/L
BASOPHILS # BLD AUTO: 0.05 K/UL
BASOPHILS NFR BLD: 0.8 %
BUN SERPL-MCNC: 11 MG/DL
CALCIUM SERPL-MCNC: 9.8 MG/DL
CHLORIDE SERPL-SCNC: 107 MMOL/L
CO2 SERPL-SCNC: 26 MMOL/L
CREAT SERPL-MCNC: 1.4 MG/DL
DIFFERENTIAL METHOD: ABNORMAL
EOSINOPHIL # BLD AUTO: 0.2 K/UL
EOSINOPHIL NFR BLD: 2.6 %
ERYTHROCYTE [DISTWIDTH] IN BLOOD BY AUTOMATED COUNT: 14.6 %
EST. GFR  (AFRICAN AMERICAN): >60 ML/MIN/1.73 M^2
EST. GFR  (NON AFRICAN AMERICAN): 55 ML/MIN/1.73 M^2
GLUCOSE SERPL-MCNC: 96 MG/DL
HCT VFR BLD AUTO: 41.3 %
HGB BLD-MCNC: 13.8 G/DL
IMM GRANULOCYTES # BLD AUTO: 0.01 K/UL
IMM GRANULOCYTES NFR BLD AUTO: 0.2 %
LYMPHOCYTES # BLD AUTO: 1.9 K/UL
LYMPHOCYTES NFR BLD: 30.7 %
MAGNESIUM SERPL-MCNC: 1.8 MG/DL
MCH RBC QN AUTO: 27.7 PG
MCHC RBC AUTO-ENTMCNC: 33.4 G/DL
MCV RBC AUTO: 83 FL
MONOCYTES # BLD AUTO: 0.6 K/UL
MONOCYTES NFR BLD: 9.6 %
NEUTROPHILS # BLD AUTO: 3.4 K/UL
NEUTROPHILS NFR BLD: 56.1 %
NRBC BLD-RTO: 0 /100 WBC
PHOSPHATE SERPL-MCNC: 2.6 MG/DL
PLATELET # BLD AUTO: 188 K/UL
PMV BLD AUTO: 11.6 FL
POTASSIUM SERPL-SCNC: 5.4 MMOL/L
RBC # BLD AUTO: 4.98 M/UL
SODIUM SERPL-SCNC: 140 MMOL/L
TACROLIMUS BLD-MCNC: 6.9 NG/ML
WBC # BLD AUTO: 6.13 K/UL

## 2018-03-02 PROCEDURE — 36415 COLL VENOUS BLD VENIPUNCTURE: CPT

## 2018-03-02 PROCEDURE — 85025 COMPLETE CBC W/AUTO DIFF WBC: CPT

## 2018-03-02 PROCEDURE — 83735 ASSAY OF MAGNESIUM: CPT

## 2018-03-02 PROCEDURE — 80069 RENAL FUNCTION PANEL: CPT

## 2018-03-02 PROCEDURE — 80197 ASSAY OF TACROLIMUS: CPT

## 2018-03-20 DIAGNOSIS — Z94.0 KIDNEY REPLACED BY TRANSPLANT: Primary | ICD-10-CM

## 2018-04-04 ENCOUNTER — OFFICE VISIT (OUTPATIENT)
Dept: NEPHROLOGY | Facility: CLINIC | Age: 58
End: 2018-04-04
Payer: MEDICARE

## 2018-04-04 VITALS
BODY MASS INDEX: 29.72 KG/M2 | SYSTOLIC BLOOD PRESSURE: 136 MMHG | WEIGHT: 189.38 LBS | HEART RATE: 68 BPM | HEIGHT: 67 IN | OXYGEN SATURATION: 98 % | DIASTOLIC BLOOD PRESSURE: 68 MMHG

## 2018-04-04 DIAGNOSIS — E83.42 HYPOMAGNESEMIA: Chronic | ICD-10-CM

## 2018-04-04 DIAGNOSIS — I10 HYPERTENSION, UNSPECIFIED TYPE: ICD-10-CM

## 2018-04-04 DIAGNOSIS — Z79.60 LONG-TERM USE OF IMMUNOSUPPRESSANT MEDICATION: ICD-10-CM

## 2018-04-04 DIAGNOSIS — I27.20 PULMONARY HYPERTENSION: Chronic | ICD-10-CM

## 2018-04-04 DIAGNOSIS — N18.2 CKD (CHRONIC KIDNEY DISEASE) STAGE 2, GFR 60-89 ML/MIN: Primary | ICD-10-CM

## 2018-04-04 DIAGNOSIS — E83.39 HYPOPHOSPHATEMIA: Chronic | ICD-10-CM

## 2018-04-04 DIAGNOSIS — Z94.0 S/P KIDNEY TRANSPLANT: ICD-10-CM

## 2018-04-04 DIAGNOSIS — E87.20 ACIDOSIS: ICD-10-CM

## 2018-04-04 DIAGNOSIS — B34.8 BK VIREMIA: ICD-10-CM

## 2018-04-04 PROCEDURE — 99214 OFFICE O/P EST MOD 30 MIN: CPT | Mod: S$PBB,,, | Performed by: INTERNAL MEDICINE

## 2018-04-04 PROCEDURE — 99999 PR PBB SHADOW E&M-EST. PATIENT-LVL III: CPT | Mod: PBBFAC,,, | Performed by: INTERNAL MEDICINE

## 2018-04-04 PROCEDURE — 99213 OFFICE O/P EST LOW 20 MIN: CPT | Mod: PBBFAC | Performed by: INTERNAL MEDICINE

## 2018-04-04 NOTE — LETTER
April 4, 2018      Shilpa Rothman MD  [address]  [facility] [address]           [facility]  [address]  [facility] [address]  Phone: [phone]  Fax: [phone]          Patient: Cj Downing   MR Number: [id number]   YOB: 1960   Date of Visit: 4/4/2018       Dear Dr. Shilpa Rothman:    Thank you for referring Cj Downing to me for evaluation. Attached you will find relevant portions of my assessment and plan of care.    If you have questions, please do not hesitate to call me. I look forward to following Cj Downing along with you.    Sincerely,    Angelika Dhillon MD    Enclosure  CC:  No Recipients    If you would like to receive this communication electronically, please contact [email or web address] or [phone] to request more information on [employer] access.    For providers and/or their staff who would like to refer a patient to [facility], please contact us through our one-stop-shop provider referral line, [facility] [place], at [phone].    If you feel you have received this communication in error or would no longer like to receive these types of communications, please e-mail [email or web address]

## 2018-04-04 NOTE — PROGRESS NOTES
Subjective:       Patient ID: Cj Downing is a 57 y.o. Black or  male who presents for new evaluation of renal funciton  HPI   Mr. Downing is a 57 y.o. year old Black or  male with history of ESRD presumed secondary to HTN who was on dialysis since 3/7/2007 until receiving PHS increased risk DBD DDRT (Campath induction, CMV D+/R+) on 1/28/16. Post-op course complicated by donor positive blood culture for which patient was treated for 2 weeks with Ancef until 2/18/16. He has CKD stage 2 - GFR 60-89 and his baseline creatinine is between 1.2 to 1.6. He takes mycophenolate mofetil and tacrolimus for maintenance immunosuppression.   He ddi develop BK viremia, currently resolved and level not dectable  Heartburn intermittently no N&V   he has not taken kayexalate for several months, it was not refilled   He  is not taking sodium bicarb current CO2 26   He is on mg ox 400 mg bid   The patient has no symptoms of fatigue, shortness of breath, chest pain, peripheral edema, flank pain, dysuria, hematuria, foamy urine, orange colored urine, nephrolithiasis,  diarrhea, constipation, lower extremity leg cramping, headache, nausea and vomiting, or weakness.    Review of Systems   Constitutional: Negative for activity change, appetite change, chills, diaphoresis, fatigue, fever and unexpected weight change.   HENT: Negative for congestion, ear discharge, ear pain, facial swelling, hearing loss, nosebleeds, sinus pressure, sore throat and trouble swallowing.    Eyes: Negative for photophobia, pain, discharge, redness, itching and visual disturbance.   Respiratory: Negative for apnea, cough, chest tightness, shortness of breath and wheezing.    Cardiovascular: Negative for chest pain, palpitations and leg swelling.   Gastrointestinal: Positive for abdominal pain. Negative for abdominal distention, constipation, diarrhea and vomiting.        Heartburn   Endocrine: Negative for cold  "intolerance, heat intolerance, polydipsia and polyuria.   Genitourinary: Negative for decreased urine volume, difficulty urinating, dysuria, flank pain, frequency, hematuria, scrotal swelling, testicular pain and urgency.   Musculoskeletal: Negative for arthralgias, back pain, gait problem, joint swelling, myalgias, neck pain and neck stiffness.   Skin: Negative for color change, pallor, rash and wound.   Allergic/Immunologic: Negative for environmental allergies and food allergies.   Neurological: Negative for dizziness, tremors, seizures, syncope, facial asymmetry, speech difficulty, weakness, light-headedness, numbness and headaches.   Hematological: Negative for adenopathy. Does not bruise/bleed easily.   Psychiatric/Behavioral: Negative for agitation, behavioral problems, dysphoric mood and sleep disturbance. The patient is not nervous/anxious.        Objective:     Blood pressure 136/68, pulse 68, height 5' 7" (1.702 m), weight 85.9 kg (189 lb 6 oz), SpO2 98 %.      Physical Exam   Constitutional: He is oriented to person, place, and time. He appears well-developed and well-nourished. No distress.   HENT:   Head: Normocephalic and atraumatic.   Mouth/Throat: Oropharynx is clear and moist. No oropharyngeal exudate.   Eyes: Conjunctivae and EOM are normal. Pupils are equal, round, and reactive to light. Right eye exhibits no discharge. Left eye exhibits no discharge. No scleral icterus.   Neck: Normal range of motion. Neck supple. No JVD present. No tracheal deviation present. No thyromegaly present.   Cardiovascular: Normal rate, regular rhythm, normal heart sounds and intact distal pulses.  Exam reveals no gallop and no friction rub.    No murmur heard.  No edmea,    Pulmonary/Chest: Effort normal and breath sounds normal. No stridor. No respiratory distress. He has no wheezes. He has no rales. He exhibits no tenderness.   Abdominal: Soft. Bowel sounds are normal. He exhibits no distension and no mass. There " is no tenderness. There is no rebound and no guarding. No hernia.   RLQ kidney palpated nontender, no fullness or distention   Musculoskeletal: Normal range of motion. He exhibits no edema or tenderness.   Lymphadenopathy:     He has no cervical adenopathy.   Neurological: He is alert and oriented to person, place, and time. No cranial nerve deficit. He exhibits normal muscle tone. Coordination normal.   Skin: Skin is warm and dry. No rash noted. He is not diaphoretic. No erythema. No pallor.   Psychiatric: He has a normal mood and affect. His behavior is normal. Judgment and thought content normal.   Nursing note and vitals reviewed.      Assessment:       1. CKD (chronic kidney disease) stage 2, GFR 60-89 ml/min    2. S/P kidney transplant 1/28/2016    3. Hypophosphatemia    4. Hypomagnesemia    5. Hypertension, unspecified type    6. Pulmonary hypertension - PASP 58mmHg (9/13) improved on recent echo PA pressure of 13    7. Acidosis    8. BK viremia    9. Long-term use of immunosuppressant medication        Plan:     Mr. Downing is a 57 y.o. male with:     ESRD presumed secondary to HTN who was on dialysis since 3/7/2007 until receiving PHS increased risk DBD DDRT (Campath induction, CMV D+/R+) on 1/28/16: baseline Cr 1.2  to 1.6      1. CKD stage: Education provided in appropriate fluid intake, potassium intake. Continue with oral hydration  Heartburn:  GI referral no recent endoscopy or colonoscopy     2. Immunosuppression: stable  Mycophenolate recently increased 250 bid--> 500 bid, wbc stable   Lab Results   Component Value Date    TACROLIMUS 6.9 03/02/2018      Will closely monitor for toxicities, education provided about adherence to medicines and need to communicate any side effct to the transplant nurse or physician    3. Allograft Function: stable   Lab Results   Component Value Date    CREATININE 1.4 03/02/2018       4. Hypertension management:  Stable on coreg and nifedipien  Continue with home blood  pressure monitoring, low salt and healthy life discussed with the patient    5. Metabolic Bone Disease/Secondary Hyperparathyroidism: calcium and phosphorus as per our center protocol. Will monitor PTH, Vit D level, calcium    check PTH and vit d levels follow phos   Lab Results   Component Value Date    .0 (H) 03/23/2016    CALCIUM 9.8 03/02/2018    PHOS 2.6 (L) 03/02/2018       6. Electrolytes: reviewed with the patient, essentially within the normal range no need for acute changes today, will monitor as per our center guidelines   2 gm K diet reviewed, would rather use patriomer than kayexalate, will order 8.4 gm daily  Lab Results   Component Value Date     03/02/2018    K 5.4 (H) 03/02/2018     03/02/2018    CO2 26 03/02/2018       7. Anemia: will continue monitoring as per our center guidelines. No indication for acute intervention today   stable. Check iron stores   Lab Results   Component Value Date    WBC 6.13 03/02/2018    HGB 13.8 (L) 03/02/2018    HCT 41.3 03/02/2018    MCV 83 03/02/2018     03/02/2018       8.Proteinuria: will continue with pr/cr ratio as per our center guidelines  Lab Results   Component Value Date    PROTEINURINE <7 01/25/2018    CREATRANDUR 97.0 01/25/2018    UTPCR Unable to calculate 01/25/2018        9. BK virus infection screening:  H/o BK viremia  will continue with urine or blood PCR as per our guidelines to prevent BK virus viremia and allograft dysfunction  Lab Results   Component Value Date    BKVIRUSPCRQB <125 01/25/2018         10. Weight education: provided during the clinic visit   Body mass index is 29.66 kg/m².       11.Patient safety education regarding immunosuppression including prophylaxis posttransplant for CMV, PCP : Education provided about vaccination and prevention of infections    12.  Cytopenias: no significant cytopenias will monitor as per our guidelines. Medicine list reviewed including potential causes of drug-induced  cytopenias     Lab Results   Component Value Date    WBC 6.13 03/02/2018    HGB 13.8 (L) 03/02/2018    HCT 41.3 03/02/2018    MCV 83 03/02/2018     03/02/2018     Dermatology referral next visit    I spoke with the patient for 30 minutes. More than half dedicated to counseling and education. All questions answered  Clinic: return to transplant clinic weekly for the first month after transplant; every 2 weeks during months 2-3; then at 6-, 9-, 12-, 18-, 24-, and 36- months post-transplant to reassess for complications from immunosuppression toxicity and monitor for rejection.  Annually thereafter.     Labs: since patient remains at high risk for rejection and drug-related complications that warrant close monitoring, labs will be ordered as follows: continue twice weekly CBC, renal panel, and drug level for first month; then same labs once weekly through 3rd month post-transplant.  Urine for UA and protein/creatinine ratio monthly.  Serum BK - PCR at 1-, 3-, 6-, 9-, 12-, 18-, 24-, and 36- months post-transplant.  Hepatic panel at 1-, 2-, 3-, 6-, 9-, 12-, 18-, 24-, and 36- months post-transplant.       RTC 3 mo  patriomer daily    low K diet   labs and tac level in 3 mo

## 2018-04-04 NOTE — PATIENT INSTRUCTIONS
1. Labs: and urine in 3 mo  Low K diet list     2.  Medications: veltassa ( patiromer 8.4 gm daily)    3. Referrals:    4. Follow up with PCP regardin. BP:  Take BP and pulse  twice daily for one week, record              Bring results  to next visit.              Goal :   <140/90    6. Diet:  National Kidney Foundation:  [email or web address]       Sodium: < 2000 milligrams daily including all food and drink      (one teaspoon of table salt has 2300 milligrams of sodium)         Potassium: 2 gm        Phosphorus: <1000mg daily       Vaccines: please check with your PCP and be sure to have an annual flu vaccine and keep up to date with your pneumococcal vaccine for pneumonia      Please avoid or minimize all NSAIDS (ibuprofen, motrin, aleve, indocin, naprosyn, BC powder, mobic, relafen, alleve, and any others) to minimize the risk to your kidneys    Please avoid Proton pump inhibitors unless specifically necessary and speak with your PCP about alternatives such as H2 blockers     Return to clinic:  3 mo

## 2018-04-05 ENCOUNTER — TELEPHONE (OUTPATIENT)
Dept: PHARMACY | Facility: CLINIC | Age: 58
End: 2018-04-05

## 2018-04-11 NOTE — TELEPHONE ENCOUNTER
Documentation Only:    Prior Authorization for Sergio has been approved for 8 months.    Approval dates:  04/11/2019 through 12/31/2018    Patient co-pay: $8.35

## 2018-04-16 ENCOUNTER — TELEPHONE (OUTPATIENT)
Dept: PHARMACY | Facility: CLINIC | Age: 58
End: 2018-04-16

## 2018-04-16 NOTE — TELEPHONE ENCOUNTER
Sergio initial consultation and shipment. No answer. [facility] for call back. Co-pay $8.35.

## 2018-04-16 NOTE — TELEPHONE ENCOUNTER
Initial Medication Consult: Veltassa    Initial Veltassa consult completed on . Veltassa will be picked up on . $8.35 copay. Patient will start Veltassa on . Address confirmed, CC on file. Confirmed 2 patient identifiers - name and . Therapy Appropriate.    Veltassa 8.4 grams:  Take 1 packet by mouth daily   -VELTASSA should be taken once daily mixed with a  Minimum of 1/3 cup of water, preferably at the same time each day. Do NOT heat or mix with warm or hot drinks.   -VELTASSA should be taken with food.  - Store refrigerated for best stability. If stored at room temperature, must be discarded after 90 days.     DDIs: Medication list reviewed. No DDIs or allergies.  Separate other oral medications by 3 hours.   - Plans to take medication at lunch.  Mr. Downing is currently taking NO medications at lunchtime.  This will allow separation of at least 3 hours to all other medications (including immunosuppression).    Common side effects:  Diarrhea/constipation, nausea, upset stomach, gas.   More serious side effects should be reported to MD: signs of allergic reaction, like rash; hives; itching; red, swollen, blistered, or peeling skin with or without fever; wheezing; tightness in the chest or throat; trouble breathing or talking; unusual hoarseness; or swelling of the mouth, face, lips, tongue, or throat OR Signs of low magnesium levels like mood changes, muscle pain or weakness, muscle cramps or spasms, seizures, shakiness, not hungry, very bad upset stomach or throwing up, or a heartbeat that does not feel normal    Discussed the importance of staying well hydrated while on therapy. Compliance stressed - patient to take missed doses as soon as remembered, but NOT to take 2 doses in one day. Patient will report questions or concerns to myself or practitioner. Patient verbalizes understanding. Patient plans to start Veltassa on . Consultation included: indication; goals of treatment;  administration; storage and handling; side effects; how to handle side effects; the importance of compliance; how to handle missed doses; the importance of laboratory monitoring; the importance of keeping all follow up appointments.  Patient understands to report any medication changes to [facility] and provider. All questions answered and addressed to patients satisfaction.  I will f/u with patient in 1-2 weeks from start, and [facility] to coordinate next [facility].    Olga Urbina, PharmD, [facility]  Clinical Pharmacist   [facility]  Phone: [phone]

## 2018-04-17 DIAGNOSIS — N18.30 ANEMIA OF CHRONIC RENAL FAILURE, STAGE 3 (MODERATE): Chronic | ICD-10-CM

## 2018-04-17 DIAGNOSIS — N18.2 CKD (CHRONIC KIDNEY DISEASE) STAGE 2, GFR 60-89 ML/MIN: ICD-10-CM

## 2018-04-17 DIAGNOSIS — N25.81 SECONDARY HYPERPARATHYROIDISM, RENAL: ICD-10-CM

## 2018-04-17 DIAGNOSIS — Z94.0 S/P KIDNEY TRANSPLANT: ICD-10-CM

## 2018-04-17 DIAGNOSIS — D63.1 ANEMIA OF CHRONIC RENAL FAILURE, STAGE 3 (MODERATE): Chronic | ICD-10-CM

## 2018-04-17 DIAGNOSIS — I10 ESSENTIAL HYPERTENSION: ICD-10-CM

## 2018-04-17 DIAGNOSIS — E55.9 VITAMIN D DEFICIENCY DISEASE: ICD-10-CM

## 2018-04-17 RX ORDER — CARVEDILOL 12.5 MG/1
TABLET ORAL
Qty: 60 TABLET | Refills: 0 | Status: SHIPPED | OUTPATIENT
Start: 2018-04-17 | End: 2018-05-24

## 2018-05-01 RX ORDER — LANOLIN ALCOHOL/MO/W.PET/CERES
CREAM (GRAM) TOPICAL
Qty: 180 TABLET | Refills: 7 | OUTPATIENT
Start: 2018-05-01

## 2018-05-15 ENCOUNTER — TELEPHONE (OUTPATIENT)
Dept: PHARMACY | Facility: CLINIC | Age: 58
End: 2018-05-15

## 2018-05-15 NOTE — TELEPHONE ENCOUNTER
Initial clinical follow-up conducted for Veltassa. Spoke with patient.  Name and  confirmed.  Confirmed patient start date of 18.  Patient confirms that he is taking Veltassa at lunch as planned in initial consultation.  Patient denies skipping or missing doses and dose count confirms.  Patient reports experiencing no side effects since beginning therapy.  Patient reports no new medications, otc remedies, or allergies. Patient reminded of lab appointments.  Patient counseled on importance of maintaining adherence and keeping lab appointments which were scheduled.  Advised to call [facility] and provider if any issues arise.

## 2018-05-24 DIAGNOSIS — D63.1 ANEMIA OF CHRONIC RENAL FAILURE, STAGE 3 (MODERATE): Chronic | ICD-10-CM

## 2018-05-24 DIAGNOSIS — N25.81 SECONDARY HYPERPARATHYROIDISM, RENAL: ICD-10-CM

## 2018-05-24 DIAGNOSIS — Z94.0 S/P KIDNEY TRANSPLANT: ICD-10-CM

## 2018-05-24 DIAGNOSIS — I10 ESSENTIAL HYPERTENSION: ICD-10-CM

## 2018-05-24 DIAGNOSIS — N18.2 CKD (CHRONIC KIDNEY DISEASE) STAGE 2, GFR 60-89 ML/MIN: ICD-10-CM

## 2018-05-24 DIAGNOSIS — E55.9 VITAMIN D DEFICIENCY DISEASE: ICD-10-CM

## 2018-05-24 DIAGNOSIS — N18.30 ANEMIA OF CHRONIC RENAL FAILURE, STAGE 3 (MODERATE): Chronic | ICD-10-CM

## 2018-05-24 RX ORDER — CARVEDILOL 12.5 MG/1
TABLET ORAL
Qty: 60 TABLET | Refills: 11 | Status: SHIPPED | OUTPATIENT
Start: 2018-05-24 | End: 2019-07-03 | Stop reason: SDUPTHER

## 2018-05-30 ENCOUNTER — TELEPHONE (OUTPATIENT)
Dept: PHARMACY | Facility: CLINIC | Age: 58
End: 2018-05-30

## 2018-06-06 ENCOUNTER — LAB VISIT (OUTPATIENT)
Dept: LAB | Facility: HOSPITAL | Age: 58
End: 2018-06-06
Attending: INTERNAL MEDICINE
Payer: MEDICARE

## 2018-06-06 ENCOUNTER — TELEPHONE (OUTPATIENT)
Dept: TRANSPLANT | Facility: CLINIC | Age: 58
End: 2018-06-06

## 2018-06-06 DIAGNOSIS — E83.39 HYPOPHOSPHATASIA: Primary | ICD-10-CM

## 2018-06-06 DIAGNOSIS — Z94.0 KIDNEY REPLACED BY TRANSPLANT: ICD-10-CM

## 2018-06-06 DIAGNOSIS — E83.39 HYPOPHOSPHATEMIA: ICD-10-CM

## 2018-06-06 LAB
ALBUMIN SERPL BCP-MCNC: 3.6 G/DL
ANION GAP SERPL CALC-SCNC: 6 MMOL/L
BASOPHILS # BLD AUTO: 0.04 K/UL
BASOPHILS NFR BLD: 0.9 %
BUN SERPL-MCNC: 13 MG/DL
CALCIUM SERPL-MCNC: 10.1 MG/DL
CHLORIDE SERPL-SCNC: 110 MMOL/L
CO2 SERPL-SCNC: 25 MMOL/L
CREAT SERPL-MCNC: 1.5 MG/DL
DIFFERENTIAL METHOD: ABNORMAL
EOSINOPHIL # BLD AUTO: 0.1 K/UL
EOSINOPHIL NFR BLD: 1.8 %
ERYTHROCYTE [DISTWIDTH] IN BLOOD BY AUTOMATED COUNT: 14.2 %
EST. GFR  (AFRICAN AMERICAN): 58.5 ML/MIN/1.73 M^2
EST. GFR  (NON AFRICAN AMERICAN): 50.6 ML/MIN/1.73 M^2
GLUCOSE SERPL-MCNC: 126 MG/DL
HCT VFR BLD AUTO: 38.1 %
HGB BLD-MCNC: 12.8 G/DL
IMM GRANULOCYTES # BLD AUTO: 0.01 K/UL
IMM GRANULOCYTES NFR BLD AUTO: 0.2 %
LYMPHOCYTES # BLD AUTO: 1.3 K/UL
LYMPHOCYTES NFR BLD: 29.1 %
MAGNESIUM SERPL-MCNC: 1.7 MG/DL
MCH RBC QN AUTO: 28 PG
MCHC RBC AUTO-ENTMCNC: 33.6 G/DL
MCV RBC AUTO: 83 FL
MONOCYTES # BLD AUTO: 0.4 K/UL
MONOCYTES NFR BLD: 9.3 %
NEUTROPHILS # BLD AUTO: 2.7 K/UL
NEUTROPHILS NFR BLD: 58.7 %
NRBC BLD-RTO: 0 /100 WBC
PHOSPHATE SERPL-MCNC: 1.9 MG/DL
PLATELET # BLD AUTO: 152 K/UL
PMV BLD AUTO: 11.4 FL
POTASSIUM SERPL-SCNC: 4.3 MMOL/L
RBC # BLD AUTO: 4.57 M/UL
SODIUM SERPL-SCNC: 141 MMOL/L
TACROLIMUS BLD-MCNC: 8.1 NG/ML
WBC # BLD AUTO: 4.54 K/UL

## 2018-06-06 PROCEDURE — 85025 COMPLETE CBC W/AUTO DIFF WBC: CPT

## 2018-06-06 PROCEDURE — 80069 RENAL FUNCTION PANEL: CPT

## 2018-06-06 PROCEDURE — 80197 ASSAY OF TACROLIMUS: CPT

## 2018-06-06 PROCEDURE — 87799 DETECT AGENT NOS DNA QUANT: CPT

## 2018-06-06 PROCEDURE — 83735 ASSAY OF MAGNESIUM: CPT

## 2018-06-06 PROCEDURE — 36415 COLL VENOUS BLD VENIPUNCTURE: CPT

## 2018-06-06 NOTE — TELEPHONE ENCOUNTER
----- Message from Shilpa Rothman MD sent at 6/6/2018  3:57 PM CDT -----  Prograf higher than goal but previously stable on this dose --> arrange for repeat level within the next week. Phos low -->  on higher phos diet and start  mg BID.

## 2018-06-06 NOTE — PROGRESS NOTES
Prograf higher than goal but previously stable on this dose --> arrange for repeat level within the next week. Phos low -->  on higher phos diet and start  mg BID.

## 2018-06-06 NOTE — TELEPHONE ENCOUNTER
Spoke to pt, reviewed labs and DR Rothman's instructions, pt will increase phos diet, start KPN 500mg BID and repeat prograf level on Tuesday, 6/12/18 as directed.  Pt voices no complaints at present time.

## 2018-06-08 LAB
BKV DNA SERPL NAA+PROBE-ACNC: <125 COPIES/ML
BKV DNA SERPL NAA+PROBE-LOG#: <2.1 LOG (10) COPIES/ML
BKV DNA SERPL QL NAA+PROBE: NOT DETECTED

## 2018-06-12 ENCOUNTER — LAB VISIT (OUTPATIENT)
Dept: LAB | Facility: HOSPITAL | Age: 58
End: 2018-06-12
Attending: INTERNAL MEDICINE
Payer: MEDICARE

## 2018-06-12 DIAGNOSIS — Z94.0 KIDNEY REPLACED BY TRANSPLANT: ICD-10-CM

## 2018-06-12 LAB — TACROLIMUS BLD-MCNC: 11.2 NG/ML

## 2018-06-12 PROCEDURE — 36415 COLL VENOUS BLD VENIPUNCTURE: CPT

## 2018-06-12 PROCEDURE — 80197 ASSAY OF TACROLIMUS: CPT

## 2018-06-12 NOTE — PROGRESS NOTES
Prograf level even higher --> any new meds? Any diarrhea? If true 12 hour trough decrease Prograf from 3/2 to 2/1. Repeat labs in 7-10 days

## 2018-06-13 ENCOUNTER — TELEPHONE (OUTPATIENT)
Dept: TRANSPLANT | Facility: CLINIC | Age: 58
End: 2018-06-13

## 2018-06-13 DIAGNOSIS — Z94.0 S/P KIDNEY TRANSPLANT: ICD-10-CM

## 2018-06-13 DIAGNOSIS — Z79.60 LONG-TERM USE OF IMMUNOSUPPRESSANT MEDICATION: ICD-10-CM

## 2018-06-13 RX ORDER — TACROLIMUS 1 MG/1
CAPSULE ORAL
Qty: 90 CAPSULE | Refills: 11 | Status: SHIPPED | OUTPATIENT
Start: 2018-06-13 | End: 2018-09-26 | Stop reason: DRUGHIGH

## 2018-06-13 NOTE — TELEPHONE ENCOUNTER
----- Message from Shilpa Rothman MD sent at 6/12/2018  5:51 PM CDT -----  Prograf level even higher --> any new meds? Any diarrhea? If true 12 hour trough decrease Prograf from 3/2 to 2/1. Repeat labs in 7-10 days

## 2018-06-13 NOTE — TELEPHONE ENCOUNTER
Spoke to pt,  Reviewed labs and Dr Rothman's instructions.  Pt confirms 12 hr level, no new meds or diarrhea.  Will decrease prograf to 2/1 and repeat labs on 6/25/18.

## 2018-06-25 ENCOUNTER — LAB VISIT (OUTPATIENT)
Dept: LAB | Facility: HOSPITAL | Age: 58
End: 2018-06-25
Attending: INTERNAL MEDICINE
Payer: MEDICARE

## 2018-06-25 DIAGNOSIS — Z94.0 KIDNEY REPLACED BY TRANSPLANT: ICD-10-CM

## 2018-06-25 LAB — TACROLIMUS BLD-MCNC: 5 NG/ML

## 2018-06-25 PROCEDURE — 80197 ASSAY OF TACROLIMUS: CPT

## 2018-06-25 PROCEDURE — 36415 COLL VENOUS BLD VENIPUNCTURE: CPT

## 2018-06-27 ENCOUNTER — TELEPHONE (OUTPATIENT)
Dept: PHARMACY | Facility: CLINIC | Age: 58
End: 2018-06-27

## 2018-08-06 ENCOUNTER — TELEPHONE (OUTPATIENT)
Dept: PHARMACY | Facility: CLINIC | Age: 58
End: 2018-08-06

## 2018-08-06 NOTE — TELEPHONE ENCOUNTER
Veltassa refill confirmed. We will ship Veltassa refill on  via fedex to arrive on . $8.35 copay- 004. Confirmed 2 patient identifiers - name and .     Patient has 6 doses of Veltassa remaining and takes it around lunchtime daily.  Pt reports they are not having any side effects so far. No missed doses, no new medications, no new allergies or health conditions reported at this time.. All questions answered and addressed to patients satisfaction. Pt verbalized understanding.    Patient declined f/u due to being at work.  Will f/u in 3 months.

## 2018-09-04 ENCOUNTER — LAB VISIT (OUTPATIENT)
Dept: LAB | Facility: HOSPITAL | Age: 58
End: 2018-09-04
Attending: INTERNAL MEDICINE
Payer: MEDICARE

## 2018-09-04 DIAGNOSIS — Z94.0 KIDNEY REPLACED BY TRANSPLANT: ICD-10-CM

## 2018-09-04 LAB
ALBUMIN SERPL BCP-MCNC: 3.6 G/DL
ANION GAP SERPL CALC-SCNC: 7 MMOL/L
BASOPHILS # BLD AUTO: 0.05 K/UL
BASOPHILS NFR BLD: 0.7 %
BUN SERPL-MCNC: 14 MG/DL
CALCIUM SERPL-MCNC: 9.8 MG/DL
CHLORIDE SERPL-SCNC: 107 MMOL/L
CO2 SERPL-SCNC: 25 MMOL/L
CREAT SERPL-MCNC: 1.4 MG/DL
DIFFERENTIAL METHOD: ABNORMAL
EOSINOPHIL # BLD AUTO: 0.1 K/UL
EOSINOPHIL NFR BLD: 2.1 %
ERYTHROCYTE [DISTWIDTH] IN BLOOD BY AUTOMATED COUNT: 14.8 %
EST. GFR  (AFRICAN AMERICAN): >60 ML/MIN/1.73 M^2
EST. GFR  (NON AFRICAN AMERICAN): 55 ML/MIN/1.73 M^2
GLUCOSE SERPL-MCNC: 100 MG/DL
HCT VFR BLD AUTO: 42 %
HGB BLD-MCNC: 13.6 G/DL
IMM GRANULOCYTES # BLD AUTO: 0.02 K/UL
IMM GRANULOCYTES NFR BLD AUTO: 0.3 %
LYMPHOCYTES # BLD AUTO: 1.3 K/UL
LYMPHOCYTES NFR BLD: 18.5 %
MAGNESIUM SERPL-MCNC: 2 MG/DL
MCH RBC QN AUTO: 27.3 PG
MCHC RBC AUTO-ENTMCNC: 32.4 G/DL
MCV RBC AUTO: 84 FL
MONOCYTES # BLD AUTO: 0.7 K/UL
MONOCYTES NFR BLD: 10.4 %
NEUTROPHILS # BLD AUTO: 4.6 K/UL
NEUTROPHILS NFR BLD: 68 %
NRBC BLD-RTO: 0 /100 WBC
PHOSPHATE SERPL-MCNC: 2.3 MG/DL
PLATELET # BLD AUTO: 168 K/UL
PMV BLD AUTO: 11.2 FL
POTASSIUM SERPL-SCNC: 4.7 MMOL/L
RBC # BLD AUTO: 4.99 M/UL
SODIUM SERPL-SCNC: 139 MMOL/L
TACROLIMUS BLD-MCNC: 4.1 NG/ML
WBC # BLD AUTO: 6.8 K/UL

## 2018-09-04 PROCEDURE — 87799 DETECT AGENT NOS DNA QUANT: CPT

## 2018-09-04 PROCEDURE — 80069 RENAL FUNCTION PANEL: CPT

## 2018-09-04 PROCEDURE — 83735 ASSAY OF MAGNESIUM: CPT

## 2018-09-04 PROCEDURE — 85025 COMPLETE CBC W/AUTO DIFF WBC: CPT

## 2018-09-04 PROCEDURE — 80197 ASSAY OF TACROLIMUS: CPT

## 2018-09-18 ENCOUNTER — TELEPHONE (OUTPATIENT)
Dept: PHARMACY | Facility: CLINIC | Age: 58
End: 2018-09-18

## 2018-09-18 NOTE — TELEPHONE ENCOUNTER
Refill call for [facility]. Patient confirmed that they are in need of the refill. Will prepare to ship out 18 to arrive 18 with patient consent to charge the [facility] Copay $8.35 at 004. Address &  confirmed.Patient has 5 doses remaining. Patient has not started any new medications, no missed doses but having some diarrhea. Patient taking the medication as directed by doctor. Patient transferred to pharmacist for further consult.

## 2018-09-19 ENCOUNTER — TELEPHONE (OUTPATIENT)
Dept: TRANSPLANT | Facility: CLINIC | Age: 58
End: 2018-09-19

## 2018-09-19 NOTE — TELEPHONE ENCOUNTER
----- Message from Essence Wilkinson RN sent at 9/7/2018  4:55 PM CDT -----      ----- Message -----  From: Shilpa Rothman MD  Sent: 9/4/2018   3:37 PM  To: [facility] Post-Kidney Transplant Clinical    Prograf on lower end but previously ok --> arrange for repeat in a week

## 2018-09-21 ENCOUNTER — LAB VISIT (OUTPATIENT)
Dept: LAB | Facility: HOSPITAL | Age: 58
End: 2018-09-21
Attending: INTERNAL MEDICINE
Payer: MEDICARE

## 2018-09-21 DIAGNOSIS — Z94.0 KIDNEY REPLACED BY TRANSPLANT: ICD-10-CM

## 2018-09-21 LAB — TACROLIMUS BLD-MCNC: 3.7 NG/ML

## 2018-09-21 PROCEDURE — 80197 ASSAY OF TACROLIMUS: CPT

## 2018-09-21 PROCEDURE — 36415 COLL VENOUS BLD VENIPUNCTURE: CPT

## 2018-09-21 NOTE — PROGRESS NOTES
Prograf lower than goal --> if true 12 hour trough then increase Prograf from 2/1 to 2 mg BID. Repeat labs in 7-10 days

## 2018-09-26 DIAGNOSIS — Z79.60 LONG-TERM USE OF IMMUNOSUPPRESSANT MEDICATION: ICD-10-CM

## 2018-09-26 DIAGNOSIS — Z94.0 S/P KIDNEY TRANSPLANT: ICD-10-CM

## 2018-09-26 RX ORDER — TACROLIMUS 1 MG/1
CAPSULE ORAL
Qty: 120 CAPSULE | Refills: 11 | Status: SHIPPED | OUTPATIENT
Start: 2018-09-26 | End: 2019-02-04 | Stop reason: SDUPTHER

## 2018-09-26 NOTE — TELEPHONE ENCOUNTER
Notes recorded by Shilpa Rothman MD on 9/21/2018 at 12:02 PM CDT  Prograf lower than goal --> if true 12 hour trough then increase Prograf from 2/1 to 2 mg BID. Repeat labs in 7-10 days    Instructed Pt Pt of these orders, He verbalized understanding repeat labs 10/2/18

## 2018-10-03 ENCOUNTER — LAB VISIT (OUTPATIENT)
Dept: LAB | Facility: HOSPITAL | Age: 58
End: 2018-10-03
Attending: INTERNAL MEDICINE
Payer: MEDICARE

## 2018-10-03 ENCOUNTER — IMMUNIZATION (OUTPATIENT)
Dept: INTERNAL MEDICINE | Facility: CLINIC | Age: 58
End: 2018-10-03
Payer: MEDICARE

## 2018-10-03 DIAGNOSIS — Z94.0 KIDNEY REPLACED BY TRANSPLANT: ICD-10-CM

## 2018-10-03 LAB — TACROLIMUS BLD-MCNC: 6.5 NG/ML

## 2018-10-03 PROCEDURE — 36415 COLL VENOUS BLD VENIPUNCTURE: CPT

## 2018-10-03 PROCEDURE — 90686 IIV4 VACC NO PRSV 0.5 ML IM: CPT | Mod: PBBFAC

## 2018-10-03 PROCEDURE — 80197 ASSAY OF TACROLIMUS: CPT

## 2018-10-11 ENCOUNTER — TELEPHONE (OUTPATIENT)
Dept: PHARMACY | Facility: CLINIC | Age: 58
End: 2018-10-11

## 2018-10-17 NOTE — TELEPHONE ENCOUNTER
Veltassa refill and f/u attempted. No answer and no VM. Will f/u.   - Will send postcard and message nurse coordinator

## 2018-11-21 NOTE — TELEPHONE ENCOUNTER
Veltassa refill confirmed. We will ship Veltassa refill on  via fedex to arrive on . $8.35 copay- 004. Confirmed 2 patient identifiers - name and .     Patient has a few doses of Veltassa remaining and takes it around lunchtime daily.  Patient states he had a lot of medication on hand but is now running low.  Mr. Downing states he was out of town for a while and wasn't able to answer his phone.  Pt reports they are not having any side effects so far. No missed doses, no new medications, no new allergies or health conditions reported at this time.. All questions answered and addressed to patients satisfaction. Pt verbalized understanding.

## 2018-12-19 ENCOUNTER — LAB VISIT (OUTPATIENT)
Dept: LAB | Facility: HOSPITAL | Age: 58
End: 2018-12-19
Attending: INTERNAL MEDICINE
Payer: MEDICARE

## 2018-12-19 ENCOUNTER — TELEPHONE (OUTPATIENT)
Dept: PHARMACY | Facility: CLINIC | Age: 58
End: 2018-12-19

## 2018-12-19 ENCOUNTER — TELEPHONE (OUTPATIENT)
Dept: TRANSPLANT | Facility: CLINIC | Age: 58
End: 2018-12-19

## 2018-12-19 DIAGNOSIS — Z94.0 KIDNEY REPLACED BY TRANSPLANT: ICD-10-CM

## 2018-12-19 LAB
ALBUMIN SERPL BCP-MCNC: 3.9 G/DL
ALBUMIN SERPL BCP-MCNC: 3.9 G/DL
ALP SERPL-CCNC: 60 U/L
ALT SERPL W/O P-5'-P-CCNC: 11 U/L
ANION GAP SERPL CALC-SCNC: 9 MMOL/L
AST SERPL-CCNC: 23 U/L
BASOPHILS # BLD AUTO: 0.06 K/UL
BASOPHILS NFR BLD: 1 %
BILIRUB DIRECT SERPL-MCNC: 0.4 MG/DL
BILIRUB SERPL-MCNC: 1 MG/DL
BUN SERPL-MCNC: 8 MG/DL
CALCIUM SERPL-MCNC: 9.6 MG/DL
CHLORIDE SERPL-SCNC: 104 MMOL/L
CO2 SERPL-SCNC: 25 MMOL/L
CREAT SERPL-MCNC: 1.3 MG/DL
DIFFERENTIAL METHOD: NORMAL
EOSINOPHIL # BLD AUTO: 0.1 K/UL
EOSINOPHIL NFR BLD: 1.5 %
ERYTHROCYTE [DISTWIDTH] IN BLOOD BY AUTOMATED COUNT: 14.3 %
EST. GFR  (AFRICAN AMERICAN): >60 ML/MIN/1.73 M^2
EST. GFR  (NON AFRICAN AMERICAN): 59.7 ML/MIN/1.73 M^2
GLUCOSE SERPL-MCNC: 94 MG/DL
HCT VFR BLD AUTO: 43.5 %
HGB BLD-MCNC: 14.7 G/DL
IMM GRANULOCYTES # BLD AUTO: 0.02 K/UL
IMM GRANULOCYTES NFR BLD AUTO: 0.3 %
LYMPHOCYTES # BLD AUTO: 1.6 K/UL
LYMPHOCYTES NFR BLD: 26 %
MAGNESIUM SERPL-MCNC: 1.7 MG/DL
MCH RBC QN AUTO: 28.1 PG
MCHC RBC AUTO-ENTMCNC: 33.8 G/DL
MCV RBC AUTO: 83 FL
MONOCYTES # BLD AUTO: 0.6 K/UL
MONOCYTES NFR BLD: 9.9 %
NEUTROPHILS # BLD AUTO: 3.7 K/UL
NEUTROPHILS NFR BLD: 61.3 %
NRBC BLD-RTO: 0 /100 WBC
PHOSPHATE SERPL-MCNC: 2 MG/DL
PLATELET # BLD AUTO: 179 K/UL
PMV BLD AUTO: 11.5 FL
POTASSIUM SERPL-SCNC: 3.7 MMOL/L
PROT SERPL-MCNC: 7.9 G/DL
RBC # BLD AUTO: 5.23 M/UL
SODIUM SERPL-SCNC: 138 MMOL/L
TACROLIMUS BLD-MCNC: 1.9 NG/ML
WBC # BLD AUTO: 5.97 K/UL

## 2018-12-19 PROCEDURE — 87799 DETECT AGENT NOS DNA QUANT: CPT

## 2018-12-19 PROCEDURE — 82247 BILIRUBIN TOTAL: CPT

## 2018-12-19 PROCEDURE — 80197 ASSAY OF TACROLIMUS: CPT

## 2018-12-19 PROCEDURE — 84155 ASSAY OF PROTEIN SERUM: CPT

## 2018-12-19 PROCEDURE — 84075 ASSAY ALKALINE PHOSPHATASE: CPT

## 2018-12-19 PROCEDURE — 85025 COMPLETE CBC W/AUTO DIFF WBC: CPT

## 2018-12-19 PROCEDURE — 80069 RENAL FUNCTION PANEL: CPT

## 2018-12-19 PROCEDURE — 83735 ASSAY OF MAGNESIUM: CPT

## 2018-12-19 NOTE — PROGRESS NOTES
Please increase prograf to 3 mg BID if it is a true trough. He has had low prograf level in the past (compliance issue?).

## 2018-12-19 NOTE — TELEPHONE ENCOUNTER
Pt states he missed all his medications yesterday. He realized after labs this am. Will have repeat labs 12/21/18.

## 2018-12-19 NOTE — TELEPHONE ENCOUNTER
----- Message from Sharon Jones MD sent at 12/19/2018  1:25 PM CST -----  Please increase prograf to 3 mg BID if it is a true trough. He has had low prograf level in the past (compliance issue?).

## 2018-12-19 NOTE — TELEPHONE ENCOUNTER
Documentation Only:    Re-authorization for Sergio has been approved for 1 year    Approval dates: 12.30.2018 until 12.31.2019    AKF 2:36pm

## 2018-12-31 ENCOUNTER — TELEPHONE (OUTPATIENT)
Dept: PHARMACY | Facility: CLINIC | Age: 58
End: 2018-12-31

## 2019-01-02 ENCOUNTER — LAB VISIT (OUTPATIENT)
Dept: LAB | Facility: HOSPITAL | Age: 59
End: 2019-01-02
Attending: INTERNAL MEDICINE
Payer: MEDICARE

## 2019-01-02 DIAGNOSIS — Z94.0 KIDNEY REPLACED BY TRANSPLANT: ICD-10-CM

## 2019-01-02 DIAGNOSIS — E83.39 HYPOPHOSPHATEMIA: ICD-10-CM

## 2019-01-02 LAB
ALBUMIN SERPL BCP-MCNC: 3.5 G/DL
ANION GAP SERPL CALC-SCNC: 6 MMOL/L
BASOPHILS # BLD AUTO: 0.06 K/UL
BASOPHILS NFR BLD: 0.8 %
BUN SERPL-MCNC: 14 MG/DL
CALCIUM SERPL-MCNC: 9.6 MG/DL
CHLORIDE SERPL-SCNC: 104 MMOL/L
CO2 SERPL-SCNC: 28 MMOL/L
CREAT SERPL-MCNC: 1.3 MG/DL
DIFFERENTIAL METHOD: ABNORMAL
EOSINOPHIL # BLD AUTO: 0.1 K/UL
EOSINOPHIL NFR BLD: 1.8 %
ERYTHROCYTE [DISTWIDTH] IN BLOOD BY AUTOMATED COUNT: 14.4 %
EST. GFR  (AFRICAN AMERICAN): >60 ML/MIN/1.73 M^2
EST. GFR  (NON AFRICAN AMERICAN): 59.7 ML/MIN/1.73 M^2
GLUCOSE SERPL-MCNC: 101 MG/DL
HCT VFR BLD AUTO: 41.3 %
HGB BLD-MCNC: 13.7 G/DL
IMM GRANULOCYTES # BLD AUTO: 0.04 K/UL
IMM GRANULOCYTES NFR BLD AUTO: 0.5 %
LYMPHOCYTES # BLD AUTO: 1.5 K/UL
LYMPHOCYTES NFR BLD: 20.1 %
MCH RBC QN AUTO: 27.8 PG
MCHC RBC AUTO-ENTMCNC: 33.2 G/DL
MCV RBC AUTO: 84 FL
MONOCYTES # BLD AUTO: 0.6 K/UL
MONOCYTES NFR BLD: 8.4 %
NEUTROPHILS # BLD AUTO: 5 K/UL
NEUTROPHILS NFR BLD: 68.4 %
NRBC BLD-RTO: 0 /100 WBC
PHOSPHATE SERPL-MCNC: 2.2 MG/DL
PLATELET # BLD AUTO: 209 K/UL
PMV BLD AUTO: 10.8 FL
POTASSIUM SERPL-SCNC: 3.9 MMOL/L
RBC # BLD AUTO: 4.93 M/UL
SODIUM SERPL-SCNC: 138 MMOL/L
TACROLIMUS BLD-MCNC: 5 NG/ML
WBC # BLD AUTO: 7.35 K/UL

## 2019-01-02 PROCEDURE — 80069 RENAL FUNCTION PANEL: CPT

## 2019-01-02 PROCEDURE — 85025 COMPLETE CBC W/AUTO DIFF WBC: CPT

## 2019-01-02 PROCEDURE — 80197 ASSAY OF TACROLIMUS: CPT

## 2019-01-02 PROCEDURE — 36415 COLL VENOUS BLD VENIPUNCTURE: CPT

## 2019-01-02 NOTE — PROGRESS NOTES
Prograf improved. Phos low -->  on higher phos diet. Is he still unable to afford KPN? If he is unable to afford it please remove it from med list. If he is taking  mg BID then increase to 750 mg BID.

## 2019-01-02 NOTE — TELEPHONE ENCOUNTER
----- Message from Shilpa Rothman MD sent at 1/2/2019  1:17 PM CST -----  Prograf improved. Phos low -->  on higher phos diet. Is he still unable to afford KPN? If he is unable to afford it please remove it from med list. If he is taking  mg BID then increase to 750 mg BID.

## 2019-01-14 NOTE — TELEPHONE ENCOUNTER
Attempted to contact patient for refill Veltassa.  Unable to contact patient - [facility].

## 2019-01-23 ENCOUNTER — TELEPHONE (OUTPATIENT)
Dept: TRANSPLANT | Facility: CLINIC | Age: 59
End: 2019-01-23

## 2019-01-23 NOTE — TELEPHONE ENCOUNTER
Called Pt unable to leave a message voicemail full. Calling to inquire about if he is taking MMF and Why hasn't he refilled or picked up refills.

## 2019-01-23 NOTE — TELEPHONE ENCOUNTER
Pt called me back he is unable to afford the MMF. I WILL GET WITH [facility] AND SEE IF THEY CAN HELP HIM THRU [facility].

## 2019-01-29 NOTE — TELEPHONE ENCOUNTER
Veltassa refill confirmed. We will ship Veltassa refill on  via fedex to arrive on . $8.50 copay- 004. Confirmed 2 patient identifiers - name and .     Patient has 7 doses of Veltassa remaining and takes it about 3-4 times weekly (coordinator aware).  Pt reports they are not having any side effects so far. No missed doses, no new medications, no new allergies or health conditions reported at this time.. All questions answered and addressed to patients satisfaction. Pt verbalized understanding.

## 2019-02-04 ENCOUNTER — OFFICE VISIT (OUTPATIENT)
Dept: TRANSPLANT | Facility: CLINIC | Age: 59
End: 2019-02-04
Payer: MEDICARE

## 2019-02-04 VITALS
DIASTOLIC BLOOD PRESSURE: 95 MMHG | RESPIRATION RATE: 18 BRPM | WEIGHT: 187.38 LBS | SYSTOLIC BLOOD PRESSURE: 135 MMHG | HEART RATE: 80 BPM | OXYGEN SATURATION: 97 % | HEIGHT: 67 IN | BODY MASS INDEX: 29.41 KG/M2 | TEMPERATURE: 98 F

## 2019-02-04 DIAGNOSIS — Z79.60 LONG-TERM USE OF IMMUNOSUPPRESSANT MEDICATION: ICD-10-CM

## 2019-02-04 DIAGNOSIS — Z94.0 S/P KIDNEY TRANSPLANT: ICD-10-CM

## 2019-02-04 DIAGNOSIS — N18.2 CKD (CHRONIC KIDNEY DISEASE) STAGE 2, GFR 60-89 ML/MIN: ICD-10-CM

## 2019-02-04 DIAGNOSIS — E83.39 HYPOPHOSPHATEMIA: Chronic | ICD-10-CM

## 2019-02-04 DIAGNOSIS — D63.1 ANEMIA OF CHRONIC RENAL FAILURE, STAGE 2 (MILD): Chronic | ICD-10-CM

## 2019-02-04 DIAGNOSIS — E83.42 HYPOMAGNESEMIA: Chronic | ICD-10-CM

## 2019-02-04 DIAGNOSIS — E55.9 VITAMIN D DEFICIENCY DISEASE: ICD-10-CM

## 2019-02-04 DIAGNOSIS — I10 HYPERTENSION, UNSPECIFIED TYPE: ICD-10-CM

## 2019-02-04 DIAGNOSIS — N18.2 ANEMIA OF CHRONIC RENAL FAILURE, STAGE 2 (MILD): Chronic | ICD-10-CM

## 2019-02-04 DIAGNOSIS — Z29.89 PROPHYLACTIC IMMUNOTHERAPY: ICD-10-CM

## 2019-02-04 DIAGNOSIS — Z94.0 S/P KIDNEY TRANSPLANT: Primary | ICD-10-CM

## 2019-02-04 PROCEDURE — 99215 PR OFFICE/OUTPT VISIT, EST, LEVL V, 40-54 MIN: ICD-10-PCS | Mod: S$PBB,,, | Performed by: NURSE PRACTITIONER

## 2019-02-04 PROCEDURE — 99215 OFFICE O/P EST HI 40 MIN: CPT | Mod: S$PBB,,, | Performed by: NURSE PRACTITIONER

## 2019-02-04 PROCEDURE — 99999 PR PBB SHADOW E&M-EST. PATIENT-LVL III: ICD-10-PCS | Mod: PBBFAC,,, | Performed by: NURSE PRACTITIONER

## 2019-02-04 PROCEDURE — 99999 PR PBB SHADOW E&M-EST. PATIENT-LVL III: CPT | Mod: PBBFAC,,, | Performed by: NURSE PRACTITIONER

## 2019-02-04 PROCEDURE — 99213 OFFICE O/P EST LOW 20 MIN: CPT | Mod: PBBFAC | Performed by: NURSE PRACTITIONER

## 2019-02-04 RX ORDER — MYCOPHENOLATE MOFETIL 250 MG/1
500 CAPSULE ORAL 2 TIMES DAILY
Qty: 120 CAPSULE | Refills: 11 | Status: SHIPPED | OUTPATIENT
Start: 2019-02-04 | End: 2019-12-27

## 2019-02-04 RX ORDER — MYCOPHENOLATE MOFETIL 250 MG/1
500 CAPSULE ORAL 2 TIMES DAILY
Qty: 120 CAPSULE | Refills: 11 | Status: SHIPPED | OUTPATIENT
Start: 2019-02-04 | End: 2019-02-04 | Stop reason: SDUPTHER

## 2019-02-04 RX ORDER — TACROLIMUS 1 MG/1
CAPSULE ORAL
Qty: 120 CAPSULE | Refills: 11 | Status: SHIPPED | OUTPATIENT
Start: 2019-02-04 | End: 2019-12-27

## 2019-02-04 RX ORDER — TACROLIMUS 1 MG/1
CAPSULE ORAL
Qty: 120 CAPSULE | Refills: 11 | Status: SHIPPED | OUTPATIENT
Start: 2019-02-04 | End: 2019-02-04 | Stop reason: SDUPTHER

## 2019-02-04 NOTE — PROGRESS NOTES
Kidney Post-Transplant Assessment    Referring Physician: Dino Schmidt  Current Nephrologist: Dino Schmidt    ORGAN: RIGHT KIDNEY  Donor Type:  - brain death  PHS Increased Risk: yes  Cold Ischemia: 355 mins  Induction Medications: campath - alemtuzumab (anti-cd52)    Subjective:     CC:  Reassessment of renal allograft function and management of chronic immunosuppression.    HPI:  Mr. Downing is a 58 y.o. year old Black or  male who has a History of ESRD presumed secondary to HTN. He was on dialysis since 3/7/2007,  until receiving PHS increased risk DBD DDRT (Campath induction, CMV D+/R+) on 16 .   He has CKD stage 2 - GFR 60-89 and his baseline creatinine is between 1.2-1.4. He takes tacrolimus for maintenance immunosuppression. No Recent hospitalizations or ER visits since his previous clinic visit.    HX: + BK, in which he was  tx with leflunomide     BK PCR detected/ 194  With 2.29 copies --> MMF was previously held d/t + BK  .     Interval HX:   Reports he has not taken MMF in about a year bc it was not covered by his insurance.   Pt reports having about 3 weeks of prograf left   [facility]cription sent to [facility] per pt request bc they are offering him assistance with the prograf.   Overall feels well.  Denies chest pain, SOB, leg pain, abdominal pain or LUTs.    Past Medical History:   Diagnosis Date    Anemia of chronic renal failure 12/3/2013    Blind right eye     Cataract     CKD (chronic kidney disease) stage 2, GFR 60-89 ml/min 2016    ESRD (end stage renal disease)     Hypertension     Hypomagnesemia 2017    Hypophosphatemia 2017    Latent TB - 11 - INH x 9 months 12/3/2013    Pulmonary hypertension - PASP 58mmHg () 12/3/2013    S/P kidney transplant 2016     Secondary hyperparathyroidism, renal        Review of Systems   Constitutional: Negative for activity change, appetite change, chills, fatigue, fever and unexpected  "weight change.   HENT: Negative for congestion, facial swelling, postnasal drip, rhinorrhea, sinus pressure, sore throat and trouble swallowing.    Eyes: Positive for visual disturbance. Negative for pain and redness.        Blind right eye   Respiratory: Negative for cough, chest tightness, shortness of breath and wheezing.    Cardiovascular: Negative.  Negative for chest pain, palpitations and leg swelling.   Gastrointestinal: Negative for abdominal pain, diarrhea, nausea and vomiting.   Genitourinary: Negative for dysuria, flank pain and urgency.   Musculoskeletal: Negative for gait problem, neck pain and neck stiffness.   Skin: Negative for rash.   Allergic/Immunologic: Positive for immunocompromised state. Negative for environmental allergies and food allergies.   Neurological: Negative for dizziness, weakness, light-headedness and headaches.   Psychiatric/Behavioral: Negative for agitation and confusion. The patient is not nervous/anxious.        Objective:     Blood pressure (!) 135/95, pulse 80, temperature 98.1 °F (36.7 °C), temperature source Oral, resp. rate 18, height 5' 7" (1.702 m), weight 85 kg (187 lb 6.3 oz), SpO2 97 %.body mass index is 29.35 kg/m².    Physical Exam   Constitutional: He is oriented to person, place, and time. He appears well-developed and well-nourished.   HENT:   Head: Normocephalic.   Mouth/Throat: Oropharynx is clear and moist. No oropharyngeal exudate.   Eyes: Conjunctivae and EOM are normal. Pupils are equal, round, and reactive to light. No scleral icterus.   Neck: Normal range of motion. Neck supple.   Cardiovascular: Normal rate, regular rhythm and normal heart sounds.   Pulmonary/Chest: Effort normal and breath sounds normal.   Abdominal: Soft. Normal appearance and bowel sounds are normal. He exhibits no distension and no mass. There is no splenomegaly or hepatomegaly. There is no tenderness. There is no rebound, no guarding, no CVA tenderness, no tenderness at McBurney's " point and negative Roberts's sign.       Musculoskeletal: Normal range of motion. He exhibits no edema.        Arms:  Lymphadenopathy:     He has no cervical adenopathy.   Neurological: He is alert and oriented to person, place, and time. He exhibits normal muscle tone. Coordination normal.   Skin: Skin is warm and dry.   Psychiatric: He has a normal mood and affect. His behavior is normal.   Vitals reviewed.      Labs:  Lab Results   Component Value Date    WBC 6.81 07/26/2017    HGB 13.7 (L) 07/26/2017    HCT 39.9 (L) 07/26/2017     07/26/2017    K 5.0 07/26/2017     07/26/2017    CO2 23 07/26/2017    BUN 13 07/26/2017    CREATININE 1.4 07/26/2017    EGFRNONAA 55.4 (A) 07/26/2017    CALCIUM 9.8 07/26/2017    PHOS 2.7 07/26/2017    MG 1.5 (L) 07/26/2017    ALBUMIN 3.7 07/26/2017    ALBUMIN 3.7 07/26/2017    AST 18 07/26/2017    ALT 12 07/26/2017    UTPCR Unable to calculate 07/26/2017    .0 (H) 03/23/2016    TACROLIMUS 8.4 07/26/2017       No results found for: EXTANC, EXTWBC, EXTSEGS, EXTPLATELETS, EXTHEMOGLOBI, EXTHEMATOCRI, EXTCREATININ, EXTSODIUM, EXTPOTASSIUM, EXTBUN, EXTCO2, EXTCALCIUM, EXTPHOSPHORU, EXTGLUCOSE, EXTALBUMIN, EXTAST, EXTALT, EXTBILITOTAL, EXTLIPASE, EXTAMYLASE    No results found for: EXTCYCLOSLVL, EXTSIROLIMUS, EXTTACROLVL, EXTPROTCRE, EXTPTHINTACT, EXTPROTEINUA, EXTWBCUA, EXTRBCUA    Labs were reviewed with the patient.    Assessment:     1. S/P kidney transplant 1/28/2016    2. Prophylactic immunotherapy    3. Long-term use of immunosuppressant medication    4. Hypertension, unspecified type    5. CKD (chronic kidney disease) stage 2, GFR 60-89 ml/min    6. Vitamin D deficiency disease    7. Anemia of chronic renal failure, stage 2 (mild)    8. Hypomagnesemia    9. Hypophosphatemia    10. S/P kidney transplant        Plan:   Cylex today    Follow-up:   1. CKD stage: 2  stable    2. Immunosuppression:   Prograf Trough 5.0, which is therapeutic-->   .  Prograf 2/2, MMF  500  mg BID --> prescriptions sent to [facility] per pt request , Will continue to monitor for drug toxicities.        3. Allograft Function: Stable.  Continue good po hydration .    Lab Results   Component Value Date    CREATININE 1.3 01/02/2019 1/2/2019  2yr 11mo   eGFR if African American >60 mL/min/1.73 m^2 >60.0       4. Hypertension management:   Coreg 12.5 mg BID and nifedipine 30 mg QD as prescribed       5. Metabolic Bone Disease/Secondary Hyperparathyroidism:stable.    Will monitor Vit D, PTH and CA / protocol   Lab Results   Component Value Date    .0 (H) 03/23/2016    CALCIUM 9.6 01/02/2019    PHOS 2.2 (L) 01/02/2019   Mg ox 400 mg TID,  Pt has not been taking KPN , high phos diet encouraged       6. Electrolytes: Normal calcium. Bicarbonate is normal  Lab Results   Component Value Date     01/02/2019    K 3.9 01/02/2019     01/02/2019    CO2 28 01/02/2019         7. Anemia: stable. No need for intervention    Lab Results   Component Value Date    WBC 7.35 01/02/2019    HGB 13.7 (L) 01/02/2019    HCT 41.3 01/02/2019    MCV 84 01/02/2019     01/02/2019       8.  Cytopenias: no significant cytopenias will monitor as per our guidelines. Medicine list reviewed including potential causes of drug-induced cytopenias    9.Proteinuria: continue p/c ratio as per guidelines         12/19/2018  2yr 10mo   Prot/Creat Ratio, Ur 0.00 - 0.20 0.09         10. BK virus infection screening:   will continue to monitor/ guidelines       12/19/2018  2yr 10mo   BK Virus DNA, Blood Not detected Not detected       12/19/2018  2yr 10mo   BK Virus DNA PCR, Quant, Blood <125 Copies/mL <125         11. Weight education: provided during the clinic visit   Body mass index is 29.35 kg/m².       12.Patient safety education regarding immunosuppression including prophylaxis posttransplant for CMV, PCP : Education provided about vaccination and prevention of infections       Follow-up:   Clinic: return to  transplant clinic weekly for the first month after transplant; every 2 weeks during months 2-3; then at 6-, 9-, 12-, 18-, 24-, and 36- months post-transplant to reassess for complications from immunosuppression toxicity and monitor for rejection.  Annually thereafter.    Labs: since patient remains at high risk for rejection and drug-related complications that warrant close monitoring, labs will be ordered as follows: continue twice weekly CBC, renal panel, and drug level for first month; then same labs once weekly through 3rd month post-transplant.  Urine for UA and protein/creatinine ratio monthly.  Urine BK - PCR at 1-, 3-, 6-, 9-, 12-, 18-, 24-, and 36- months post-transplant.  Hepatic panel at 1-, 2-, 3-, 6-, 9-, 12-, 18-, 24-, and 36- months post-transplant.    Delphine Amaral NP       Education:   Material provided to the patient.  Patient reminded to call with any health changes since these can be early signs of significant complications.  Also, I advised the patient to be sure any new medications or changes of old medications are discussed with either a pharmacist or physician knowledgeable with transplant to avoid rejection/drug toxicity related to significant drug interactions.    UNOS Patient Status  Functional Status: 80% - Normal activity with effort: some symptoms of disease  Physical Capacity: No Limitations

## 2019-02-04 NOTE — LETTER
February 4, 2019                     [address]  [address]  [facility] [address]  Phone: [phone]   Patient: Cj Downing   MR Number: [id number]   YOB: 1960   Date of Visit: 2/4/2019       Dear      Thank you for referring Cj Downing to me for evaluation. Attached you will find relevant portions of my assessment and plan of care.    If you have questions, please do not hesitate to call me. I look forward to following Cj Downing along with you.    Sincerely,    Delphine Amaral, NP    Enclosure    If you would like to receive this communication electronically, please contact [email or web address] or [phone] to request [employer] access.    [employer] is a tool which provides read-only access to select patient information with whom you have a relationship. Its easy to use and provides real time access to review your patients record including encounter summaries, notes, results, and demographic information.    If you feel you have received this communication in error or would no longer like to receive these types of communications, please e-mail [email or web address]

## 2019-02-04 NOTE — PROGRESS NOTES
POST TRANSPLANT CLINIC NOTE    SW met with patient in post clinic to follow up regarding any needs post transplant,assess coping and insurance concerns. Pt is a transplant recipient from 1/28/2019. Patient reported come concern with affording prograft and cellcept. Pt reports he has gone without cellcept for a while. Pt's coordinator later stepped in and reports she is processing pt's medication with [facility]. Pt reports no further concerns. SW expressed the importance of reaching out to team before he no longer has medication. Pt verbalized understanding and agreement. SW also inquired about enrolling in employers insurance. Pt reports will check with human resources department at [facility].  Patient expressed no psychosocial needs or concerns at this time, reports no issues with obtaining medications, and reports receiving medications from [facility] (may receive medication from [facility] in the near future). Pt reports knowing how to contact SW team if any additional needs arise and SW remains available at [phone].

## 2019-02-15 ENCOUNTER — TELEPHONE (OUTPATIENT)
Dept: TRANSPLANT | Facility: CLINIC | Age: 59
End: 2019-02-15

## 2019-02-15 NOTE — TELEPHONE ENCOUNTER
----- Message from Melissa Russell sent at 2/15/2019  2:52 PM CST -----  Contact: Patient      ----- Message -----  From: Nany Acosta  Sent: 2/15/2019  11:47 AM  To: [facility] Post-Kidney Transplant Clinical    Needs Advice     Reason for call: Called to f/u on assistance with his medications. Patient stated he was speak with maria victoria about this        Communication Preference: [phone]    Additional Information: n/a

## 2019-03-11 ENCOUNTER — TELEPHONE (OUTPATIENT)
Dept: PHARMACY | Facility: CLINIC | Age: 59
End: 2019-03-11

## 2019-03-26 NOTE — TELEPHONE ENCOUNTER
Veltassa refill attempted. No answer. LVM for call back.   - Postcard sent to home and [facility] contacted  - Phone goes to busy signal

## 2019-04-08 RX ORDER — LANOLIN ALCOHOL/MO/W.PET/CERES
400 CREAM (GRAM) TOPICAL 2 TIMES DAILY
Qty: 180 TABLET | Refills: 11 | OUTPATIENT
Start: 2019-04-08

## 2019-07-03 DIAGNOSIS — D63.1 ANEMIA OF CHRONIC RENAL FAILURE, STAGE 3 (MODERATE): Chronic | ICD-10-CM

## 2019-07-03 DIAGNOSIS — I10 ESSENTIAL HYPERTENSION: ICD-10-CM

## 2019-07-03 DIAGNOSIS — N25.81 SECONDARY HYPERPARATHYROIDISM, RENAL: ICD-10-CM

## 2019-07-03 DIAGNOSIS — E55.9 VITAMIN D DEFICIENCY DISEASE: ICD-10-CM

## 2019-07-03 DIAGNOSIS — N18.2 CKD (CHRONIC KIDNEY DISEASE) STAGE 2, GFR 60-89 ML/MIN: ICD-10-CM

## 2019-07-03 DIAGNOSIS — Z94.0 S/P KIDNEY TRANSPLANT: ICD-10-CM

## 2019-07-03 DIAGNOSIS — N18.30 ANEMIA OF CHRONIC RENAL FAILURE, STAGE 3 (MODERATE): Chronic | ICD-10-CM

## 2019-07-03 RX ORDER — CARVEDILOL 12.5 MG/1
TABLET ORAL
Qty: 60 TABLET | Refills: 11 | Status: SHIPPED | OUTPATIENT
Start: 2019-07-03 | End: 2020-08-25 | Stop reason: SDUPTHER

## 2019-07-03 RX ORDER — CARVEDILOL 12.5 MG/1
TABLET ORAL
Qty: 60 TABLET | Refills: 11 | Status: CANCELLED | OUTPATIENT
Start: 2019-07-03

## 2019-12-27 DIAGNOSIS — Z79.60 LONG-TERM USE OF IMMUNOSUPPRESSANT MEDICATION: ICD-10-CM

## 2019-12-27 DIAGNOSIS — Z94.0 S/P KIDNEY TRANSPLANT: ICD-10-CM

## 2019-12-27 DIAGNOSIS — Z29.89 PROPHYLACTIC IMMUNOTHERAPY: ICD-10-CM

## 2019-12-27 RX ORDER — MYCOPHENOLATE MOFETIL 250 MG/1
CAPSULE ORAL
Qty: 120 CAPSULE | Refills: 11 | Status: SHIPPED | OUTPATIENT
Start: 2019-12-27 | End: 2020-03-31 | Stop reason: SDUPTHER

## 2019-12-27 RX ORDER — TACROLIMUS 1 MG/1
CAPSULE ORAL
Qty: 120 CAPSULE | Refills: 11 | Status: SHIPPED | OUTPATIENT
Start: 2019-12-27 | End: 2020-03-31 | Stop reason: SDUPTHER

## 2020-02-04 DIAGNOSIS — Z94.0 KIDNEY REPLACED BY TRANSPLANT: Primary | ICD-10-CM

## 2020-03-04 ENCOUNTER — LAB VISIT (OUTPATIENT)
Dept: LAB | Facility: HOSPITAL | Age: 60
End: 2020-03-04
Attending: INTERNAL MEDICINE
Payer: MEDICARE

## 2020-03-04 DIAGNOSIS — Z94.0 KIDNEY REPLACED BY TRANSPLANT: ICD-10-CM

## 2020-03-04 LAB
BILIRUB UR QL STRIP: NEGATIVE
CLARITY UR REFRACT.AUTO: CLEAR
COLOR UR AUTO: YELLOW
CREAT UR-MCNC: 82 MG/DL (ref 23–375)
GLUCOSE UR QL STRIP: NEGATIVE
HGB UR QL STRIP: NEGATIVE
KETONES UR QL STRIP: NEGATIVE
LEUKOCYTE ESTERASE UR QL STRIP: NEGATIVE
NITRITE UR QL STRIP: NEGATIVE
PH UR STRIP: 6 [PH] (ref 5–8)
PROT UR QL STRIP: NEGATIVE
PROT UR-MCNC: <7 MG/DL (ref 0–15)
PROT/CREAT UR: NORMAL MG/G{CREAT} (ref 0–0.2)
SP GR UR STRIP: 1.01 (ref 1–1.03)
URN SPEC COLLECT METH UR: NORMAL

## 2020-03-04 PROCEDURE — 81003 URINALYSIS AUTO W/O SCOPE: CPT

## 2020-03-04 PROCEDURE — 84156 ASSAY OF PROTEIN URINE: CPT

## 2020-03-31 DIAGNOSIS — Z79.60 LONG-TERM USE OF IMMUNOSUPPRESSANT MEDICATION: ICD-10-CM

## 2020-03-31 DIAGNOSIS — Z94.0 S/P KIDNEY TRANSPLANT: ICD-10-CM

## 2020-03-31 DIAGNOSIS — Z29.89 PROPHYLACTIC IMMUNOTHERAPY: ICD-10-CM

## 2020-03-31 RX ORDER — MYCOPHENOLATE MOFETIL 250 MG/1
500 CAPSULE ORAL 2 TIMES DAILY
Qty: 120 CAPSULE | Refills: 11 | Status: SHIPPED | OUTPATIENT
Start: 2020-03-31 | End: 2020-09-23 | Stop reason: SDUPTHER

## 2020-03-31 RX ORDER — TACROLIMUS 1 MG/1
CAPSULE ORAL
Qty: 120 CAPSULE | Refills: 11 | Status: SHIPPED | OUTPATIENT
Start: 2020-03-31 | End: 2020-09-23 | Stop reason: SDUPTHER

## 2020-03-31 NOTE — TELEPHONE ENCOUNTER
Annual Transplant follow up assessment    Call placed to annual post transplant patient for health assessment and evaluation of need for annual transplant clinic visit.      -Are you following with a General Nephrologist? If so, who, and when was your last visit? Dr. Lora last visit was over a year.    -Have you had any hospitalizations since your last visit? No    -What is your current dose of Immunos? Who prescribed your last refill? Cellcept 250mg 2 Caps BID and Prograf 1mg 2 Caps BID.    -Do you have any new health problems?No    -Have you been told you have any form of Cancer?No    -Have you had any recurrent infections?No

## 2020-04-16 ENCOUNTER — TELEPHONE (OUTPATIENT)
Dept: TRANSPLANT | Facility: CLINIC | Age: 60
End: 2020-04-16

## 2020-04-16 NOTE — TELEPHONE ENCOUNTER
Pt called he ran out of meds, basically his fault as he did not communicate with [facility]. As needed. meds are being shipped today per Abril with [facility] . She confirmed multiple attempts to contact and ship with no success.

## 2020-08-24 NOTE — PROGRESS NOTES
"Subjective:       Patient ID: Cj Downing is a pleasant 59 y.o. Black or  male patient    Chief Complaint: Establish Care      Patient is a pt new to me and our practice.     HPI     He had follow-up outside of [facility] for PCP until possibly last year.  He changes insurance from Medicare to commercial.  He has a long and complicated medical history with mainly kidney transplant in 2016.  Last visit in Transplant was in 2/2019. Last last lab work done in 3/2020.  Last tel encounter from GINA Wilkinson RN, transplant, in 4/2020: "Pt called he ran out of meds, basically his fault as he did not communicate with [facility]. As needed. meds are being shipped today per Abril with [facility] . She confirmed multiple attempts to contact and ship with no success".  The patient has no complaint today.  He states that he needs mainly a refill for his blood pressure medication.  He reports that " a company is working on obtaining his rejection medications".  However states that he has not received CellCept for more than 1 month and is going to run short of Prograf very soon.  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 His wife is my pt too (Ms. Regina De Souza).      Patient Active Problem List   Diagnosis    Status post corneal transplant - Both Eyes    Keratoconus, unspecified - Both Eyes    Rejection of corneal graft - Right Eye    Herpes simplex keratitis    Hypertension    Secondary hyperparathyroidism, renal    Anemia of chronic renal failure    Prophylactic immunotherapy    Long-term use of immunosuppressant medication    S/P kidney transplant 1/28/2016    Acidosis    Vitamin D deficiency disease    Positive blood culture in cadaveric donor    CKD (chronic kidney disease) stage 2, GFR 60-89 ml/min    BK viremia    Hypophosphatemia    Hypomagnesemia          ACTIVE MEDICAL ISSUES:  Documented in Problem List     PAST MEDICAL HISTORY  Documented     PAST SURGICAL HISTORY:  Documented     SOCIAL HISTORY:  Documented   "   FAMILY HISTORY:  Documented     ALLERGIES AND MEDICATIONS: updated and reviewed.  Documented    Review of Systems   Constitutional: Negative for activity change, appetite change and fatigue.   HENT: Negative for postnasal drip, rhinorrhea, sinus pressure and sore throat.    Eyes: Negative for pain and redness.   Respiratory: Negative for cough, chest tightness, shortness of breath and wheezing.    Cardiovascular: Negative for chest pain, palpitations and leg swelling.   Gastrointestinal: Negative for abdominal pain and constipation.   Genitourinary: Negative for frequency and urgency.   Musculoskeletal: Negative for back pain, gait problem and myalgias.   Skin: Negative for color change.   Allergic/Immunologic: Negative for environmental allergies and food allergies.   Neurological: Negative for headaches.   Hematological: Negative for adenopathy. Does not bruise/bleed easily.   Psychiatric/Behavioral: Negative for behavioral problems, sleep disturbance and suicidal ideas. The patient is not nervous/anxious.        Objective:      Physical Exam  Vitals signs and nursing note reviewed.   Constitutional:       Appearance: He is well-developed.   HENT:      Right Ear: External ear normal.      Left Ear: External ear normal.   Eyes:      Conjunctiva/sclera: Conjunctivae normal.      Pupils: Pupils are equal, round, and reactive to light.   Neck:      Musculoskeletal: Normal range of motion.      Thyroid: No thyromegaly.   Cardiovascular:      Rate and Rhythm: Normal rate and regular rhythm.      Heart sounds: Normal heart sounds.   Pulmonary:      Effort: Pulmonary effort is normal.      Breath sounds: Normal breath sounds. No wheezing.   Chest:      Chest wall: No tenderness.   Abdominal:      General: Bowel sounds are normal.      Palpations: Abdomen is soft. There is no mass.      Tenderness: There is no abdominal tenderness.   Musculoskeletal: Normal range of motion.         General: No tenderness or deformity.  "  Lymphadenopathy:      Cervical: No cervical adenopathy.   Skin:     General: Skin is warm and dry.      Comments: Scar on R sided abdomen (kidney transplant) calm   Neurological:      Mental Status: He is alert and oriented to person, place, and time.   Psychiatric:         Behavior: Behavior normal.         Thought Content: Thought content normal.         Judgment: Judgment normal.         Vitals:    08/25/20 0902   BP: 130/88   BP Location: Right arm   Patient Position: Sitting   BP Method: Large (Manual)   Pulse: 75   Resp: 16   Temp: 98.4 °F (36.9 °C)   SpO2: 99%   Weight: 82.2 kg (181 lb 3.5 oz)   Height: 5' 7" (1.702 m)     Body mass index is 28.38 kg/m².    RESULTS: Reviewed labs from last 12 months    Assessment:       1. Essential hypertension    2. S/P kidney transplant    3. CKD (chronic kidney disease) stage 2, GFR 60-89 ml/min    4. Thrombopenia        Plan:   Cj was seen today for establish care.    Diagnoses and all orders for this visit:    Essential hypertension  -     carvediloL (COREG) 12.5 MG tablet; Take 1 tablet (12.5 mg total) by mouth 2 (two) times daily with meals.      BP almost at goal, will resume carvedilol.  Most recent blood work done in March.  Will reassess as below.    S/P kidney transplant    See note from transplant team.  Last lab work done in March with CKD stage 2. Patient states that he has had no more CellCept for the last month and half and is going to run short of Prograf.  I sent a message to Transplant to see if he needs to be seen and to reassess patient's situation.  Will probably need regular follow-up, his insurance has been changed to commercial.    CKD (chronic kidney disease) stage 2, GFR 60-89 ml/min    See above.    Thrombopenia    Mild, will monitor.    Other orders  -     multivitamin (THERAGRAN) tablet; Take 1 tablet by mouth once daily.    Refilled as per patient's request.    Follow up if symptoms worsen or fail to improve.    This note was created " by combination of typed  and M-Modal dictation.  Transcription errors may be present.  If there are any questions, please contact me.

## 2020-08-25 ENCOUNTER — OFFICE VISIT (OUTPATIENT)
Dept: FAMILY MEDICINE | Facility: CLINIC | Age: 60
End: 2020-08-25
Payer: COMMERCIAL

## 2020-08-25 VITALS
BODY MASS INDEX: 28.44 KG/M2 | RESPIRATION RATE: 16 BRPM | TEMPERATURE: 98 F | WEIGHT: 181.19 LBS | DIASTOLIC BLOOD PRESSURE: 88 MMHG | HEIGHT: 67 IN | HEART RATE: 75 BPM | OXYGEN SATURATION: 99 % | SYSTOLIC BLOOD PRESSURE: 130 MMHG

## 2020-08-25 DIAGNOSIS — I10 ESSENTIAL HYPERTENSION: ICD-10-CM

## 2020-08-25 DIAGNOSIS — Z94.0 S/P KIDNEY TRANSPLANT: ICD-10-CM

## 2020-08-25 DIAGNOSIS — N18.2 CKD (CHRONIC KIDNEY DISEASE) STAGE 2, GFR 60-89 ML/MIN: ICD-10-CM

## 2020-08-25 DIAGNOSIS — D69.6 THROMBOPENIA: ICD-10-CM

## 2020-08-25 PROCEDURE — 99204 OFFICE O/P NEW MOD 45 MIN: CPT | Mod: S$GLB,,, | Performed by: INTERNAL MEDICINE

## 2020-08-25 PROCEDURE — 99999 PR PBB SHADOW E&M-EST. PATIENT-LVL III: CPT | Mod: PBBFAC,,, | Performed by: INTERNAL MEDICINE

## 2020-08-25 PROCEDURE — 99999 PR PBB SHADOW E&M-EST. PATIENT-LVL III: ICD-10-PCS | Mod: PBBFAC,,, | Performed by: INTERNAL MEDICINE

## 2020-08-25 PROCEDURE — 99204 PR OFFICE/OUTPT VISIT, NEW, LEVL IV, 45-59 MIN: ICD-10-PCS | Mod: S$GLB,,, | Performed by: INTERNAL MEDICINE

## 2020-08-25 RX ORDER — CARVEDILOL 12.5 MG/1
12.5 TABLET ORAL 2 TIMES DAILY WITH MEALS
Qty: 60 TABLET | Refills: 2 | Status: SHIPPED | OUTPATIENT
Start: 2020-08-25 | End: 2020-10-12 | Stop reason: SDUPTHER

## 2020-09-23 ENCOUNTER — TELEPHONE (OUTPATIENT)
Dept: TRANSPLANT | Facility: CLINIC | Age: 60
End: 2020-09-23

## 2020-09-23 DIAGNOSIS — Z94.0 S/P KIDNEY TRANSPLANT: ICD-10-CM

## 2020-09-23 DIAGNOSIS — Z29.89 PROPHYLACTIC IMMUNOTHERAPY: ICD-10-CM

## 2020-09-23 DIAGNOSIS — Z79.60 LONG-TERM USE OF IMMUNOSUPPRESSANT MEDICATION: ICD-10-CM

## 2020-09-23 RX ORDER — MYCOPHENOLATE MOFETIL 250 MG/1
500 CAPSULE ORAL 2 TIMES DAILY
Qty: 120 CAPSULE | Refills: 11 | Status: SHIPPED | OUTPATIENT
Start: 2020-09-23 | End: 2021-01-13

## 2020-09-23 RX ORDER — TACROLIMUS 1 MG/1
CAPSULE ORAL
Qty: 120 CAPSULE | Refills: 11 | Status: SHIPPED | OUTPATIENT
Start: 2020-09-23 | End: 2021-01-13

## 2020-09-23 NOTE — TELEPHONE ENCOUNTER
Spoke with Pt and he has been out of ALL meds for 1 month. I called [facility] to inquire about lacho program, unsure if they can help. Also reached out to Social workers as well as CHAPIN Barron Pt .       ----- Message from Inderjit Prescott sent at 9/23/2020 10:22 AM CDT -----  Regarding: Speak with office  Contact: Cj Morales is calling to speak with nurse about being out of rejection meds for a month.      [phone]

## 2020-09-23 NOTE — TELEPHONE ENCOUNTER
ADRYAN contacted Abril, at [facility] to follow up on pt's prescription. Abril reports she is processing pt's prescription but due to pt's history, it may be difficult. Abril continues to reports working for the pt for 1 year and has encouraged pt to reapply for Medicaid, pay for Medicare B plan, pay $45 for needed medication and pt refused all the above. Abril continued to report pt's nonchalant attitude toward care. Abril reports she will follow up with team once prescription is complete. ADRYAN remains available and will follow up.

## 2020-09-23 NOTE — TELEPHONE ENCOUNTER
ADRYAN called pt regarding concern below. ADRYAN inquired about pt's insurance benefits. Pt reports he no longer has Medicare (SW explained why he may not have qualify anymore), employer does not offer insurance, he can no longer afford previous private policy, [facility] ceased covering medications and he could not afford any medical plans through healthcare.gov. Pt reports he did not know of any other options, so he decided to call the transplant team after not having medications for one month. SW explained, the dangers of being without medication for 1 month. Pt nonchalantly verbalized understanding.    SW explained if transplant team is able to assist with medication coverage for 1 month, what will be pt's plans moving forward. Pt reports he plans to enroll in private policy after he gets paid Thursday. SW also encouraged pt to apply for Medicaid as soon as possible. Pt verbalized understanding and agreement.     ADRYAN contacted [facility] who reports pt does have an active Medicare A and D policy but none will cover pt's medications. ADRYAN attempted to get pt's medications filled at [facility] but was informed pt currently has a prescription with [facility]. SW to contact [facility] for follow up and attempted to contact coordinator for clarity.    ADRYAN contacted pt to provide update and SW will also encourage pt to apply for Medicare B. Pt verbalized understanding and agreement. ADRYAN remains available.           ----- Message from Essence Wilkinson RN sent at 9/23/2020 11:11 AM CDT -----  Regarding: NO IMMUNO'S FOR 1 MONTH  Pt called he has been out of ALL meds for 1 month..... says he has no coverage. Can I get someone to help him if possible. LEIDA

## 2020-09-24 ENCOUNTER — TELEPHONE (OUTPATIENT)
Dept: TRANSPLANT | Facility: CLINIC | Age: 60
End: 2020-09-24

## 2020-09-29 ENCOUNTER — TELEPHONE (OUTPATIENT)
Dept: TRANSPLANT | Facility: CLINIC | Age: 60
End: 2020-09-29

## 2020-09-29 ENCOUNTER — TELEPHONE (OUTPATIENT)
Dept: TRANSPLANT | Facility: HOSPITAL | Age: 60
End: 2020-09-29

## 2020-09-29 NOTE — TELEPHONE ENCOUNTER
----- Message from Essence Wilkinson RN sent at 9/29/2020 10:41 AM CDT -----  He was approved and [facility] is helping him. Essence thanks   ----- Message -----  From: REJI Wilde, [facility]  Sent: 9/23/2020   2:11 PM CDT  To: Essence Wilkinson RN    Hey,   Did you send this pt's prescription to [facility]?

## 2020-10-12 ENCOUNTER — LAB VISIT (OUTPATIENT)
Dept: LAB | Facility: HOSPITAL | Age: 60
End: 2020-10-12
Attending: INTERNAL MEDICINE
Payer: COMMERCIAL

## 2020-10-12 ENCOUNTER — OFFICE VISIT (OUTPATIENT)
Dept: TRANSPLANT | Facility: CLINIC | Age: 60
End: 2020-10-12
Payer: COMMERCIAL

## 2020-10-12 VITALS
HEART RATE: 83 BPM | RESPIRATION RATE: 18 BRPM | DIASTOLIC BLOOD PRESSURE: 95 MMHG | TEMPERATURE: 98 F | BODY MASS INDEX: 28.37 KG/M2 | WEIGHT: 180.75 LBS | HEIGHT: 67 IN | OXYGEN SATURATION: 98 % | SYSTOLIC BLOOD PRESSURE: 170 MMHG

## 2020-10-12 DIAGNOSIS — N18.2 CKD (CHRONIC KIDNEY DISEASE) STAGE 2, GFR 60-89 ML/MIN: ICD-10-CM

## 2020-10-12 DIAGNOSIS — Z29.89 PROPHYLACTIC IMMUNOTHERAPY: ICD-10-CM

## 2020-10-12 DIAGNOSIS — N25.81 SECONDARY HYPERPARATHYROIDISM, RENAL: ICD-10-CM

## 2020-10-12 DIAGNOSIS — I10 ESSENTIAL HYPERTENSION: ICD-10-CM

## 2020-10-12 DIAGNOSIS — Z94.0 KIDNEY REPLACED BY TRANSPLANT: ICD-10-CM

## 2020-10-12 DIAGNOSIS — I15.0 RENOVASCULAR HYPERTENSION: ICD-10-CM

## 2020-10-12 DIAGNOSIS — Z94.0 S/P KIDNEY TRANSPLANT: Primary | ICD-10-CM

## 2020-10-12 PROBLEM — E83.42 HYPOMAGNESEMIA: Chronic | Status: RESOLVED | Noted: 2017-01-18 | Resolved: 2020-10-12

## 2020-10-12 PROBLEM — E83.39 HYPOPHOSPHATEMIA: Chronic | Status: RESOLVED | Noted: 2017-01-18 | Resolved: 2020-10-12

## 2020-10-12 LAB
ALBUMIN SERPL BCP-MCNC: 4.1 G/DL (ref 3.5–5.2)
ALBUMIN SERPL BCP-MCNC: 4.1 G/DL (ref 3.5–5.2)
ALP SERPL-CCNC: 52 U/L (ref 55–135)
ALT SERPL W/O P-5'-P-CCNC: 15 U/L (ref 10–44)
ANION GAP SERPL CALC-SCNC: 9 MMOL/L (ref 8–16)
AST SERPL-CCNC: 26 U/L (ref 10–40)
BASOPHILS # BLD AUTO: 0.08 K/UL (ref 0–0.2)
BASOPHILS NFR BLD: 1.3 % (ref 0–1.9)
BILIRUB DIRECT SERPL-MCNC: 0.3 MG/DL (ref 0.1–0.3)
BILIRUB SERPL-MCNC: 0.7 MG/DL (ref 0.1–1)
BUN SERPL-MCNC: 16 MG/DL (ref 6–20)
CALCIUM SERPL-MCNC: 10.4 MG/DL (ref 8.7–10.5)
CHLORIDE SERPL-SCNC: 108 MMOL/L (ref 95–110)
CO2 SERPL-SCNC: 27 MMOL/L (ref 23–29)
CREAT SERPL-MCNC: 1.3 MG/DL (ref 0.5–1.4)
DIFFERENTIAL METHOD: NORMAL
EOSINOPHIL # BLD AUTO: 0.2 K/UL (ref 0–0.5)
EOSINOPHIL NFR BLD: 3.9 % (ref 0–8)
ERYTHROCYTE [DISTWIDTH] IN BLOOD BY AUTOMATED COUNT: 13.9 % (ref 11.5–14.5)
EST. GFR  (AFRICAN AMERICAN): >60 ML/MIN/1.73 M^2
EST. GFR  (NON AFRICAN AMERICAN): 59.3 ML/MIN/1.73 M^2
GLUCOSE SERPL-MCNC: 107 MG/DL (ref 70–110)
HCT VFR BLD AUTO: 42.2 % (ref 40–54)
HGB BLD-MCNC: 14 G/DL (ref 14–18)
IMM GRANULOCYTES # BLD AUTO: 0.01 K/UL (ref 0–0.04)
IMM GRANULOCYTES NFR BLD AUTO: 0.2 % (ref 0–0.5)
LYMPHOCYTES # BLD AUTO: 1.2 K/UL (ref 1–4.8)
LYMPHOCYTES NFR BLD: 20.2 % (ref 18–48)
MAGNESIUM SERPL-MCNC: 1.8 MG/DL (ref 1.6–2.6)
MCH RBC QN AUTO: 28.9 PG (ref 27–31)
MCHC RBC AUTO-ENTMCNC: 33.2 G/DL (ref 32–36)
MCV RBC AUTO: 87 FL (ref 82–98)
MONOCYTES # BLD AUTO: 0.7 K/UL (ref 0.3–1)
MONOCYTES NFR BLD: 12.1 % (ref 4–15)
NEUTROPHILS # BLD AUTO: 3.7 K/UL (ref 1.8–7.7)
NEUTROPHILS NFR BLD: 62.3 % (ref 38–73)
NRBC BLD-RTO: 0 /100 WBC
PHOSPHATE SERPL-MCNC: 3 MG/DL (ref 2.7–4.5)
PLATELET # BLD AUTO: 200 K/UL (ref 150–350)
PMV BLD AUTO: 11.7 FL (ref 9.2–12.9)
POTASSIUM SERPL-SCNC: 4.3 MMOL/L (ref 3.5–5.1)
PROT SERPL-MCNC: 7.8 G/DL (ref 6–8.4)
RBC # BLD AUTO: 4.85 M/UL (ref 4.6–6.2)
SODIUM SERPL-SCNC: 144 MMOL/L (ref 136–145)
TACROLIMUS BLD-MCNC: 4.9 NG/ML (ref 5–15)
WBC # BLD AUTO: 5.94 K/UL (ref 3.9–12.7)

## 2020-10-12 PROCEDURE — 99999 PR PBB SHADOW E&M-EST. PATIENT-LVL IV: CPT | Mod: PBBFAC,,, | Performed by: NURSE PRACTITIONER

## 2020-10-12 PROCEDURE — 83735 ASSAY OF MAGNESIUM: CPT

## 2020-10-12 PROCEDURE — 36415 COLL VENOUS BLD VENIPUNCTURE: CPT

## 2020-10-12 PROCEDURE — 84075 ASSAY ALKALINE PHOSPHATASE: CPT

## 2020-10-12 PROCEDURE — 99999 PR PBB SHADOW E&M-EST. PATIENT-LVL IV: ICD-10-PCS | Mod: PBBFAC,,, | Performed by: NURSE PRACTITIONER

## 2020-10-12 PROCEDURE — 85025 COMPLETE CBC W/AUTO DIFF WBC: CPT

## 2020-10-12 PROCEDURE — 80197 ASSAY OF TACROLIMUS: CPT

## 2020-10-12 PROCEDURE — 86352 CELL FUNCTION ASSAY W/STIM: CPT

## 2020-10-12 PROCEDURE — 80069 RENAL FUNCTION PANEL: CPT

## 2020-10-12 PROCEDURE — 99215 PR OFFICE/OUTPT VISIT, EST, LEVL V, 40-54 MIN: ICD-10-PCS | Mod: S$GLB,,, | Performed by: NURSE PRACTITIONER

## 2020-10-12 PROCEDURE — 99215 OFFICE O/P EST HI 40 MIN: CPT | Mod: S$GLB,,, | Performed by: NURSE PRACTITIONER

## 2020-10-12 PROCEDURE — 87799 DETECT AGENT NOS DNA QUANT: CPT

## 2020-10-12 RX ORDER — CARVEDILOL 12.5 MG/1
12.5 TABLET ORAL 2 TIMES DAILY WITH MEALS
Qty: 60 TABLET | Refills: 0 | Status: SHIPPED | OUTPATIENT
Start: 2020-10-12 | End: 2022-01-10 | Stop reason: SDUPTHER

## 2020-10-12 NOTE — PROGRESS NOTES
Kidney Post-Transplant Assessment    Referring Physician: Dino Schmidt  Current Nephrologist: Dino Schmidt    ORGAN: RIGHT KIDNEY  Donor Type:  - brain death  PHS Increased Risk: yes  Cold Ischemia: 355 mins  Induction Medications: campath - alemtuzumab (anti-cd52)    Subjective:     CC:  Reassessment of renal allograft function and management of chronic immunosuppression.    HPI:  Mr. Downing is a 59 y.o. year old Black or  male who has a History of ESRD presumed secondary to HTN. He was on dialysis since 3/7/2007,  until receiving PHS increased risk DBD DDRT (Campath induction, CMV D+/R+) on 16 .   He has CKD stage 2 - GFR 60-89 and his baseline creatinine is between 1.2-1.4. He takes tacrolimus for maintenance immunosuppression. No Recent hospitalizations or ER visits since his previous clinic visit.    HX: + BK, in which he was  tx with leflunomide     BK PCR detected/ 194  With 2.29 copies --> MMF was previously held d/t + BK  .       Interval HX:  Intake feels he could drink more   UOP-no problems   BP--pt reports being out of BP meds  He has no been f/u with general nephrology--discussed the need to f/u with general nephrology  For CKD care and for Med refills.   Peripheral edema-no   Weight stable  Appetite-good      fx assessment   Lab /diagnostic results reviewed with patient today.   All questions answered        Past Medical History:   Diagnosis Date    Anemia of chronic renal failure 12/3/2013    Blind right eye     Cataract     CKD (chronic kidney disease) stage 2, GFR 60-89 ml/min 2016    CKD (chronic kidney disease) stage 2, GFR 60-89 ml/min 2016    ESRD (end stage renal disease)     Hypertension     Hypomagnesemia 2017    Hypophosphatemia 2017    Latent TB - 11 - INH x 9 months 12/3/2013    Pulmonary hypertension - PASP 58mmHg () 12/3/2013    Pulmonary hypertension - PASP 58mmHg () improved on recent echo PA  "pressure of 13 12/3/2013    S/P kidney transplant 1/28/2016     Secondary hyperparathyroidism, renal        Review of Systems   Constitutional: Negative for activity change, appetite change, chills, fatigue, fever and unexpected weight change.   HENT: Negative for congestion, facial swelling, postnasal drip, rhinorrhea, sinus pressure, sore throat and trouble swallowing.    Eyes: Positive for visual disturbance. Negative for pain and redness.        Blind right eye   Respiratory: Negative for cough, chest tightness, shortness of breath and wheezing.    Cardiovascular: Negative.  Negative for chest pain, palpitations and leg swelling.   Gastrointestinal: Negative for abdominal pain, diarrhea, nausea and vomiting.   Genitourinary: Negative for dysuria, flank pain and urgency.   Musculoskeletal: Negative for gait problem, neck pain and neck stiffness.   Skin: Negative for rash.   Allergic/Immunologic: Positive for immunocompromised state. Negative for environmental allergies and food allergies.   Neurological: Negative for dizziness, weakness, light-headedness and headaches.   Psychiatric/Behavioral: Negative for agitation and confusion. The patient is not nervous/anxious.        Objective:     Blood pressure (!) 170/95, pulse 83, temperature 98.2 °F (36.8 °C), temperature source Oral, resp. rate 18, height 5' 7" (1.702 m), weight 82 kg (180 lb 12.4 oz), SpO2 98 %.body mass index is 28.31 kg/m².    Physical Exam  Vitals signs reviewed.   Constitutional:       Appearance: Normal appearance. He is well-developed.   HENT:      Head: Normocephalic.      Mouth/Throat:      Pharynx: No oropharyngeal exudate.   Eyes:      General: No scleral icterus.     Conjunctiva/sclera: Conjunctivae normal.      Pupils: Pupils are equal, round, and reactive to light.   Neck:      Musculoskeletal: Normal range of motion and neck supple.   Cardiovascular:      Rate and Rhythm: Normal rate and regular rhythm.      Heart sounds: Normal " heart sounds.   Pulmonary:      Effort: Pulmonary effort is normal.      Breath sounds: Normal breath sounds.   Abdominal:      General: Bowel sounds are normal. There is no distension.      Palpations: Abdomen is soft. There is no hepatomegaly, splenomegaly or mass.      Tenderness: There is no abdominal tenderness. There is no guarding or rebound. Negative signs include Roberts's sign and McBurney's sign.       Musculoskeletal: Normal range of motion.        Arms:    Lymphadenopathy:      Cervical: No cervical adenopathy.   Skin:     General: Skin is warm and dry.   Neurological:      Mental Status: He is alert and oriented to person, place, and time.      Motor: No abnormal muscle tone.      Coordination: Coordination normal.   Psychiatric:         Behavior: Behavior normal.         Labs:    No results found for: EXTANC, EXTWBC, EXTSEGS, EXTPLATELETS, EXTHEMOGLOBI, EXTHEMATOCRI, EXTCREATININ, EXTSODIUM, EXTPOTASSIUM, EXTBUN, EXTCO2, EXTCALCIUM, EXTPHOSPHORU, EXTGLUCOSE, EXTALBUMIN, EXTAST, EXTALT, EXTBILITOTAL, EXTLIPASE, EXTAMYLASE    No results found for: EXTCYCLOSLVL, EXTSIROLIMUS, EXTTACROLVL, EXTPROTCRE, EXTPTHINTACT, EXTPROTEINUA, EXTWBCUA, EXTRBCUA    Labs were reviewed with the patient.    Assessment:     1. S/P kidney transplant 1/28/2016    2. Secondary hyperparathyroidism, renal    3. Prophylactic immunotherapy    4. CKD (chronic kidney disease) stage 2, GFR 60-89 ml/min    5. Renovascular hypertension        Plan:    PROGRAF TROUGH PENDING   Encouraged to f/u with general nephrology  Pt reports being out of BP meds-->1 refill given for coreg--instucted that future refills need to come from PCP or general nephrology  General neurology referral made    Pt verbalized understanding     Follow-up:   1. CKD stage: 2  stable    2. Immunosuppression:   Prograf Trough PENDING , which is therapeutic-->   .  Prograf 2/2, MMF  500 mg BID -- , Will continue to monitor for drug toxicities.        3. Allograft  Function: Stable.  Continue good po hydration .    Lab Results   Component Value Date    CREATININE 1.3 10/12/2020          10/12/2020  4yr 8mo   eGFR if African American >60 mL/min/1.73 m^2 >60.0       4. Hypertension management:   Coreg 12.5 mg BID as prescribed -->MGMT deferred to general nephrology        5. Metabolic Bone Disease/Secondary Hyperparathyroidism:stable.    Will monitor Vit D, PTH and CA / protocol  -->MGMT deferred to general nephrology    Lab Results   Component Value Date    .0 (H) 03/23/2016    CALCIUM 10.4 10/12/2020    PHOS 3.0 10/12/2020       10/12/2020  4yr 8mo   Magnesium 1.6 - 2.6 mg/dL 1.8       6. Electrolytes: Normal calcium. Bicarbonate is normal -->MGMT deferred to general nephrology    Lab Results   Component Value Date     10/12/2020    K 4.3 10/12/2020     10/12/2020    CO2 27 10/12/2020         7. Anemia: stable. No need for intervention    Lab Results   Component Value Date    WBC 5.94 10/12/2020    HGB 14.0 10/12/2020    HCT 42.2 10/12/2020    MCV 87 10/12/2020     10/12/2020       8.  Cytopenias: no significant cytopenias will monitor as per our guidelines. Medicine list reviewed including potential causes of drug-induced cytopenias    9.Proteinuria: continue p/c ratio as per guidelines          10. BK virus infection screening:   will continue to monitor/ guidelines         11. Weight education: provided during the clinic visit   Body mass index is 28.31 kg/m².       12.Patient safety education regarding immunosuppression including prophylaxis posttransplant for CMV, PCP : Education provided about vaccination and prevention of infections       Follow-up:   Clinic: return to transplant clinic weekly for the first month after transplant; every 2 weeks during months 2-3; then at 6-, 9-, 12-, 18-, 24-, and 36- months post-transplant to reassess for complications from immunosuppression toxicity and monitor for rejection.  Annually thereafter.    Labs:  since patient remains at high risk for rejection and drug-related complications that warrant close monitoring, labs will be ordered as follows: continue twice weekly CBC, renal panel, and drug level for first month; then same labs once weekly through 3rd month post-transplant.  Urine for UA and protein/creatinine ratio monthly.  Urine BK - PCR at 1-, 3-, 6-, 9-, 12-, 18-, 24-, and 36- months post-transplant.  Hepatic panel at 1-, 2-, 3-, 6-, 9-, 12-, 18-, 24-, and 36- months post-transplant.    Delphine Amaral NP       Education:   Material provided to the patient.  Patient reminded to call with any health changes since these can be early signs of significant complications.  Also, I advised the patient to be sure any new medications or changes of old medications are discussed with either a pharmacist or physician knowledgeable with transplant to avoid rejection/drug toxicity related to significant drug interactions.    UNOS Patient Status  Functional Status: 80% - Normal activity with effort: some symptoms of disease  Physical Capacity: No Limitations

## 2020-10-12 NOTE — LETTER
October 12, 2020                     [facility] 1st Fl  [address]  [facility] [address]  Phone: [phone]   Patient: Cj Downing   MR Number: [id number]   YOB: 1960   Date of Visit: 10/12/2020       Dear      Thank you for referring Cj Downing to me for evaluation. Attached you will find relevant portions of my assessment and plan of care.    If you have questions, please do not hesitate to call me. I look forward to following Cj Downing along with you.    Sincerely,    Delphine Amaral, NP    Enclosure    If you would like to receive this communication electronically, please contact [email or web address] or [phone] to request [employer] access.    [employer] is a tool which provides read-only access to select patient information with whom you have a relationship. Its easy to use and provides real time access to review your patients record including encounter summaries, notes, results, and demographic information.    If you feel you have received this communication in error or would no longer like to receive these types of communications, please e-mail [email or web address]

## 2020-10-14 LAB
BKV DNA SERPL NAA+PROBE-ACNC: <125 COPIES/ML
BKV DNA SERPL NAA+PROBE-LOG#: <2.1 LOG (10) COPIES/ML
BKV DNA SERPL QL NAA+PROBE: NOT DETECTED
IMMUNKNOW (STIMULATED): 259 NG/ML (ref 226–524)

## 2020-12-01 ENCOUNTER — TELEPHONE (OUTPATIENT)
Dept: TRANSPLANT | Facility: CLINIC | Age: 60
End: 2020-12-01

## 2020-12-01 NOTE — TELEPHONE ENCOUNTER
Patient C/O Bilat Hand Tingling x several weeks , Patient denies any acute issues . He is schedule to see Dr Schmidt and will keep coordinator posted.  ----- Message from Tj Olivarez RN sent at 12/1/2020 11:43 AM CST -----  Regarding: FW: Speak to coordinator  Contact: Pt    ----- Message -----  From: Eduin Jennings  Sent: 12/1/2020   9:34 AM CST  To: [facility] Transplant Clinical  Subject: Speak to coordinator                             Pt would like to speak with coordinator.  Pt states he has been experiencing tingling in fingers on both hands for several weeks and would like to know if he should seek medical attention.  Pt states that he is urinating fine and everything else is okay except for the tingling sensation.    Pt # [phone]

## 2021-02-03 ENCOUNTER — OFFICE VISIT (OUTPATIENT)
Dept: FAMILY MEDICINE | Facility: CLINIC | Age: 61
End: 2021-02-03
Payer: COMMERCIAL

## 2021-02-03 VITALS
HEIGHT: 67 IN | TEMPERATURE: 99 F | DIASTOLIC BLOOD PRESSURE: 94 MMHG | OXYGEN SATURATION: 97 % | RESPIRATION RATE: 16 BRPM | BODY MASS INDEX: 28.86 KG/M2 | WEIGHT: 183.88 LBS | SYSTOLIC BLOOD PRESSURE: 158 MMHG | HEART RATE: 90 BPM

## 2021-02-03 DIAGNOSIS — I10 ESSENTIAL HYPERTENSION: ICD-10-CM

## 2021-02-03 DIAGNOSIS — Z94.0 S/P KIDNEY TRANSPLANT: ICD-10-CM

## 2021-02-03 DIAGNOSIS — R20.0 NUMBNESS OF FINGERS: Primary | ICD-10-CM

## 2021-02-03 PROCEDURE — 99999 PR PBB SHADOW E&M-EST. PATIENT-LVL IV: ICD-10-PCS | Mod: PBBFAC,,, | Performed by: INTERNAL MEDICINE

## 2021-02-03 PROCEDURE — 99214 OFFICE O/P EST MOD 30 MIN: CPT | Mod: S$GLB,,, | Performed by: INTERNAL MEDICINE

## 2021-02-03 PROCEDURE — 99214 PR OFFICE/OUTPT VISIT, EST, LEVL IV, 30-39 MIN: ICD-10-PCS | Mod: S$GLB,,, | Performed by: INTERNAL MEDICINE

## 2021-02-03 PROCEDURE — 99999 PR PBB SHADOW E&M-EST. PATIENT-LVL IV: CPT | Mod: PBBFAC,,, | Performed by: INTERNAL MEDICINE

## 2021-02-04 ENCOUNTER — TELEPHONE (OUTPATIENT)
Dept: FAMILY MEDICINE | Facility: CLINIC | Age: 61
End: 2021-02-04

## 2021-02-05 DIAGNOSIS — Z94.0 KIDNEY REPLACED BY TRANSPLANT: Primary | ICD-10-CM

## 2021-02-09 ENCOUNTER — TELEPHONE (OUTPATIENT)
Dept: TRANSPLANT | Facility: CLINIC | Age: 61
End: 2021-02-09

## 2021-02-09 ENCOUNTER — LAB VISIT (OUTPATIENT)
Dept: LAB | Facility: HOSPITAL | Age: 61
End: 2021-02-09
Attending: INTERNAL MEDICINE
Payer: COMMERCIAL

## 2021-02-09 DIAGNOSIS — R20.0 FINGER NUMBNESS: Primary | ICD-10-CM

## 2021-02-09 DIAGNOSIS — Z94.0 KIDNEY REPLACED BY TRANSPLANT: ICD-10-CM

## 2021-02-09 LAB
ALBUMIN SERPL BCP-MCNC: 3.9 G/DL (ref 3.5–5.2)
ALBUMIN SERPL BCP-MCNC: 3.9 G/DL (ref 3.5–5.2)
ALP SERPL-CCNC: 42 U/L (ref 55–135)
ALT SERPL W/O P-5'-P-CCNC: 12 U/L (ref 10–44)
ANION GAP SERPL CALC-SCNC: 7 MMOL/L (ref 8–16)
AST SERPL-CCNC: 24 U/L (ref 10–40)
BASOPHILS # BLD AUTO: 0.05 K/UL (ref 0–0.2)
BASOPHILS NFR BLD: 1 % (ref 0–1.9)
BILIRUB DIRECT SERPL-MCNC: 0.3 MG/DL (ref 0.1–0.3)
BILIRUB SERPL-MCNC: 0.7 MG/DL (ref 0.1–1)
BUN SERPL-MCNC: 17 MG/DL (ref 8–23)
CALCIUM SERPL-MCNC: 9.1 MG/DL (ref 8.7–10.5)
CHLORIDE SERPL-SCNC: 110 MMOL/L (ref 95–110)
CO2 SERPL-SCNC: 25 MMOL/L (ref 23–29)
CREAT SERPL-MCNC: 1.2 MG/DL (ref 0.5–1.4)
DIFFERENTIAL METHOD: ABNORMAL
EOSINOPHIL # BLD AUTO: 0.1 K/UL (ref 0–0.5)
EOSINOPHIL NFR BLD: 2 % (ref 0–8)
ERYTHROCYTE [DISTWIDTH] IN BLOOD BY AUTOMATED COUNT: 13.6 % (ref 11.5–14.5)
EST. GFR  (AFRICAN AMERICAN): >60 ML/MIN/1.73 M^2
EST. GFR  (NON AFRICAN AMERICAN): >60 ML/MIN/1.73 M^2
GLUCOSE SERPL-MCNC: 99 MG/DL (ref 70–110)
HCT VFR BLD AUTO: 41 % (ref 40–54)
HGB BLD-MCNC: 13.6 G/DL (ref 14–18)
IMM GRANULOCYTES # BLD AUTO: 0.02 K/UL (ref 0–0.04)
IMM GRANULOCYTES NFR BLD AUTO: 0.4 % (ref 0–0.5)
LYMPHOCYTES # BLD AUTO: 1.2 K/UL (ref 1–4.8)
LYMPHOCYTES NFR BLD: 24 % (ref 18–48)
MAGNESIUM SERPL-MCNC: 1.8 MG/DL (ref 1.6–2.6)
MCH RBC QN AUTO: 29.3 PG (ref 27–31)
MCHC RBC AUTO-ENTMCNC: 33.2 G/DL (ref 32–36)
MCV RBC AUTO: 88 FL (ref 82–98)
MONOCYTES # BLD AUTO: 0.6 K/UL (ref 0.3–1)
MONOCYTES NFR BLD: 11 % (ref 4–15)
NEUTROPHILS # BLD AUTO: 3.1 K/UL (ref 1.8–7.7)
NEUTROPHILS NFR BLD: 61.6 % (ref 38–73)
NRBC BLD-RTO: 0 /100 WBC
PHOSPHATE SERPL-MCNC: 2.6 MG/DL (ref 2.7–4.5)
PLATELET # BLD AUTO: 137 K/UL (ref 150–350)
PMV BLD AUTO: 12.2 FL (ref 9.2–12.9)
POTASSIUM SERPL-SCNC: 4.1 MMOL/L (ref 3.5–5.1)
PROT SERPL-MCNC: 7.3 G/DL (ref 6–8.4)
RBC # BLD AUTO: 4.64 M/UL (ref 4.6–6.2)
SODIUM SERPL-SCNC: 142 MMOL/L (ref 136–145)
TACROLIMUS BLD-MCNC: 2.6 NG/ML (ref 5–15)
WBC # BLD AUTO: 5.01 K/UL (ref 3.9–12.7)

## 2021-02-09 PROCEDURE — 80069 RENAL FUNCTION PANEL: CPT

## 2021-02-09 PROCEDURE — 83735 ASSAY OF MAGNESIUM: CPT

## 2021-02-09 PROCEDURE — 84075 ASSAY ALKALINE PHOSPHATASE: CPT

## 2021-02-09 PROCEDURE — 85025 COMPLETE CBC W/AUTO DIFF WBC: CPT

## 2021-02-09 PROCEDURE — 36415 COLL VENOUS BLD VENIPUNCTURE: CPT

## 2021-02-09 PROCEDURE — 80197 ASSAY OF TACROLIMUS: CPT

## 2021-02-09 PROCEDURE — 82247 BILIRUBIN TOTAL: CPT

## 2021-02-12 ENCOUNTER — TELEPHONE (OUTPATIENT)
Dept: NEUROLOGY | Facility: CLINIC | Age: 61
End: 2021-02-12

## 2021-02-17 ENCOUNTER — TELEPHONE (OUTPATIENT)
Dept: TRANSPLANT | Facility: CLINIC | Age: 61
End: 2021-02-17

## 2021-02-22 ENCOUNTER — LAB VISIT (OUTPATIENT)
Dept: LAB | Facility: HOSPITAL | Age: 61
End: 2021-02-22
Attending: INTERNAL MEDICINE
Payer: COMMERCIAL

## 2021-02-22 DIAGNOSIS — Z94.0 KIDNEY REPLACED BY TRANSPLANT: ICD-10-CM

## 2021-02-22 LAB — TACROLIMUS BLD-MCNC: 5.3 NG/ML (ref 5–15)

## 2021-02-22 PROCEDURE — 80197 ASSAY OF TACROLIMUS: CPT

## 2021-02-22 PROCEDURE — 36415 COLL VENOUS BLD VENIPUNCTURE: CPT

## 2021-03-11 ENCOUNTER — PATIENT OUTREACH (OUTPATIENT)
Dept: ADMINISTRATIVE | Facility: HOSPITAL | Age: 61
End: 2021-03-11

## 2021-03-11 DIAGNOSIS — Z12.11 COLON CANCER SCREENING: Primary | ICD-10-CM

## 2021-04-07 ENCOUNTER — PATIENT OUTREACH (OUTPATIENT)
Dept: ADMINISTRATIVE | Facility: OTHER | Age: 61
End: 2021-04-07

## 2021-05-05 DIAGNOSIS — I10 HYPERTENSION: ICD-10-CM

## 2021-05-10 ENCOUNTER — TELEPHONE (OUTPATIENT)
Dept: FAMILY MEDICINE | Facility: CLINIC | Age: 61
End: 2021-05-10

## 2021-06-23 ENCOUNTER — PATIENT OUTREACH (OUTPATIENT)
Dept: ADMINISTRATIVE | Facility: HOSPITAL | Age: 61
End: 2021-06-23

## 2021-06-23 ENCOUNTER — PATIENT MESSAGE (OUTPATIENT)
Dept: ADMINISTRATIVE | Facility: HOSPITAL | Age: 61
End: 2021-06-23

## 2021-07-27 DIAGNOSIS — I10 ESSENTIAL HYPERTENSION: ICD-10-CM

## 2021-07-27 DIAGNOSIS — Z94.0 KIDNEY REPLACED BY TRANSPLANT: Primary | ICD-10-CM

## 2021-07-27 RX ORDER — CARVEDILOL 12.5 MG/1
12.5 TABLET ORAL 2 TIMES DAILY WITH MEALS
Qty: 60 TABLET | Refills: 2 | OUTPATIENT
Start: 2021-07-27

## 2021-09-20 ENCOUNTER — TELEPHONE (OUTPATIENT)
Dept: NEPHROLOGY | Facility: CLINIC | Age: 61
End: 2021-09-20

## 2021-10-06 ENCOUNTER — TELEPHONE (OUTPATIENT)
Dept: NEPHROLOGY | Facility: CLINIC | Age: 61
End: 2021-10-06

## 2021-10-06 ENCOUNTER — OFFICE VISIT (OUTPATIENT)
Dept: NEPHROLOGY | Facility: CLINIC | Age: 61
End: 2021-10-06

## 2021-10-06 DIAGNOSIS — I15.0 RENOVASCULAR HYPERTENSION: ICD-10-CM

## 2021-10-06 DIAGNOSIS — N18.2 CKD (CHRONIC KIDNEY DISEASE) STAGE 2, GFR 60-89 ML/MIN: ICD-10-CM

## 2021-10-06 DIAGNOSIS — Z94.0 S/P KIDNEY TRANSPLANT: ICD-10-CM

## 2021-10-06 PROCEDURE — 99205 PR OFFICE/OUTPT VISIT, NEW, LEVL V, 60-74 MIN: ICD-10-PCS | Mod: S$PBB,,, | Performed by: INTERNAL MEDICINE

## 2021-10-06 PROCEDURE — 99205 OFFICE O/P NEW HI 60 MIN: CPT | Mod: S$PBB,,, | Performed by: INTERNAL MEDICINE

## 2021-10-17 ENCOUNTER — PATIENT OUTREACH (OUTPATIENT)
Dept: ADMINISTRATIVE | Facility: OTHER | Age: 61
End: 2021-10-17

## 2021-11-22 DIAGNOSIS — Z94.0 KIDNEY REPLACED BY TRANSPLANT: Primary | ICD-10-CM

## 2021-12-15 ENCOUNTER — PATIENT MESSAGE (OUTPATIENT)
Dept: ADMINISTRATIVE | Facility: HOSPITAL | Age: 61
End: 2021-12-15

## 2022-01-05 ENCOUNTER — LAB VISIT (OUTPATIENT)
Dept: LAB | Facility: HOSPITAL | Age: 62
End: 2022-01-05

## 2022-01-05 DIAGNOSIS — Z94.0 S/P KIDNEY TRANSPLANT: ICD-10-CM

## 2022-01-05 DIAGNOSIS — Z79.60 LONG-TERM USE OF IMMUNOSUPPRESSANT MEDICATION: ICD-10-CM

## 2022-01-05 DIAGNOSIS — Z94.0 KIDNEY REPLACED BY TRANSPLANT: ICD-10-CM

## 2022-01-05 LAB
ALBUMIN SERPL BCP-MCNC: 4.3 G/DL (ref 3.5–5.2)
ALBUMIN SERPL BCP-MCNC: 4.3 G/DL (ref 3.5–5.2)
ALP SERPL-CCNC: 51 U/L (ref 55–135)
ALT SERPL W/O P-5'-P-CCNC: 8 U/L (ref 10–44)
ANION GAP SERPL CALC-SCNC: 9 MMOL/L (ref 8–16)
AST SERPL-CCNC: 15 U/L (ref 10–40)
BASOPHILS # BLD AUTO: 0.04 K/UL (ref 0–0.2)
BASOPHILS NFR BLD: 0.6 % (ref 0–1.9)
BILIRUB DIRECT SERPL-MCNC: 0.3 MG/DL (ref 0.1–0.3)
BILIRUB SERPL-MCNC: 0.6 MG/DL (ref 0.1–1)
BUN SERPL-MCNC: 13 MG/DL (ref 8–23)
CALCIUM SERPL-MCNC: 10.2 MG/DL (ref 8.7–10.5)
CHLORIDE SERPL-SCNC: 104 MMOL/L (ref 95–110)
CO2 SERPL-SCNC: 25 MMOL/L (ref 23–29)
CREAT SERPL-MCNC: 1.3 MG/DL (ref 0.5–1.4)
DIFFERENTIAL METHOD: ABNORMAL
EOSINOPHIL # BLD AUTO: 0.1 K/UL (ref 0–0.5)
EOSINOPHIL NFR BLD: 1.6 % (ref 0–8)
ERYTHROCYTE [DISTWIDTH] IN BLOOD BY AUTOMATED COUNT: 13.5 % (ref 11.5–14.5)
EST. GFR  (AFRICAN AMERICAN): >60 ML/MIN/1.73 M^2
EST. GFR  (NON AFRICAN AMERICAN): 58.5 ML/MIN/1.73 M^2
GLUCOSE SERPL-MCNC: 119 MG/DL (ref 70–110)
HCT VFR BLD AUTO: 38.6 % (ref 40–54)
HGB BLD-MCNC: 13 G/DL (ref 14–18)
IMM GRANULOCYTES # BLD AUTO: 0.01 K/UL (ref 0–0.04)
IMM GRANULOCYTES NFR BLD AUTO: 0.2 % (ref 0–0.5)
LYMPHOCYTES # BLD AUTO: 1.5 K/UL (ref 1–4.8)
LYMPHOCYTES NFR BLD: 23.3 % (ref 18–48)
MAGNESIUM SERPL-MCNC: 1.8 MG/DL (ref 1.6–2.6)
MCH RBC QN AUTO: 29 PG (ref 27–31)
MCHC RBC AUTO-ENTMCNC: 33.7 G/DL (ref 32–36)
MCV RBC AUTO: 86 FL (ref 82–98)
MONOCYTES # BLD AUTO: 0.6 K/UL (ref 0.3–1)
MONOCYTES NFR BLD: 8.8 % (ref 4–15)
NEUTROPHILS # BLD AUTO: 4.1 K/UL (ref 1.8–7.7)
NEUTROPHILS NFR BLD: 65.5 % (ref 38–73)
NRBC BLD-RTO: 0 /100 WBC
PHOSPHATE SERPL-MCNC: 2.9 MG/DL (ref 2.7–4.5)
PLATELET # BLD AUTO: 160 K/UL (ref 150–450)
PMV BLD AUTO: 11.4 FL (ref 9.2–12.9)
POTASSIUM SERPL-SCNC: 4.3 MMOL/L (ref 3.5–5.1)
PROT SERPL-MCNC: 8 G/DL (ref 6–8.4)
RBC # BLD AUTO: 4.48 M/UL (ref 4.6–6.2)
SODIUM SERPL-SCNC: 138 MMOL/L (ref 136–145)
TACROLIMUS BLD-MCNC: 3 NG/ML (ref 5–15)
WBC # BLD AUTO: 6.22 K/UL (ref 3.9–12.7)

## 2022-01-05 PROCEDURE — 36415 COLL VENOUS BLD VENIPUNCTURE: CPT | Performed by: NURSE PRACTITIONER

## 2022-01-05 PROCEDURE — 80197 ASSAY OF TACROLIMUS: CPT | Performed by: NURSE PRACTITIONER

## 2022-01-05 PROCEDURE — 80069 RENAL FUNCTION PANEL: CPT | Performed by: NURSE PRACTITIONER

## 2022-01-05 PROCEDURE — 84075 ASSAY ALKALINE PHOSPHATASE: CPT | Performed by: NURSE PRACTITIONER

## 2022-01-05 PROCEDURE — 83735 ASSAY OF MAGNESIUM: CPT | Performed by: NURSE PRACTITIONER

## 2022-01-05 PROCEDURE — 85025 COMPLETE CBC W/AUTO DIFF WBC: CPT | Performed by: NURSE PRACTITIONER

## 2022-01-05 RX ORDER — TACROLIMUS 1 MG/1
CAPSULE ORAL
Qty: 120 CAPSULE | Refills: 11 | Status: SHIPPED | OUTPATIENT
Start: 2022-01-05 | End: 2022-01-21 | Stop reason: DRUGHIGH

## 2022-01-10 ENCOUNTER — OFFICE VISIT (OUTPATIENT)
Dept: TRANSPLANT | Facility: CLINIC | Age: 62
End: 2022-01-10

## 2022-01-10 ENCOUNTER — TELEPHONE (OUTPATIENT)
Dept: TRANSPLANT | Facility: CLINIC | Age: 62
End: 2022-01-10

## 2022-01-10 ENCOUNTER — HOSPITAL ENCOUNTER (EMERGENCY)
Facility: HOSPITAL | Age: 62
Discharge: LEFT BEFORE DISCHARGE | End: 2022-01-10
Attending: EMERGENCY MEDICINE
Payer: OTHER GOVERNMENT

## 2022-01-10 VITALS
TEMPERATURE: 97 F | HEART RATE: 90 BPM | WEIGHT: 176.56 LBS | OXYGEN SATURATION: 99 % | DIASTOLIC BLOOD PRESSURE: 114 MMHG | SYSTOLIC BLOOD PRESSURE: 197 MMHG | RESPIRATION RATE: 16 BRPM | HEIGHT: 67 IN | BODY MASS INDEX: 27.71 KG/M2

## 2022-01-10 VITALS
OXYGEN SATURATION: 96 % | HEART RATE: 85 BPM | HEIGHT: 67 IN | RESPIRATION RATE: 18 BRPM | TEMPERATURE: 99 F | BODY MASS INDEX: 27.47 KG/M2 | SYSTOLIC BLOOD PRESSURE: 182 MMHG | WEIGHT: 175 LBS | DIASTOLIC BLOOD PRESSURE: 104 MMHG

## 2022-01-10 DIAGNOSIS — Z79.60 LONG-TERM USE OF IMMUNOSUPPRESSANT MEDICATION: ICD-10-CM

## 2022-01-10 DIAGNOSIS — Z94.0 S/P KIDNEY TRANSPLANT: Primary | ICD-10-CM

## 2022-01-10 DIAGNOSIS — N25.81 SECONDARY HYPERPARATHYROIDISM, RENAL: ICD-10-CM

## 2022-01-10 DIAGNOSIS — N18.2 CKD (CHRONIC KIDNEY DISEASE) STAGE 2, GFR 60-89 ML/MIN: ICD-10-CM

## 2022-01-10 DIAGNOSIS — I15.0 RENOVASCULAR HYPERTENSION: ICD-10-CM

## 2022-01-10 DIAGNOSIS — U07.1 COVID-19 VIRUS DETECTED: ICD-10-CM

## 2022-01-10 DIAGNOSIS — I10 ESSENTIAL HYPERTENSION: ICD-10-CM

## 2022-01-10 DIAGNOSIS — Z91.148 NONCOMPLIANCE WITH MEDICATIONS: ICD-10-CM

## 2022-01-10 LAB
CTP QC/QA: YES
SARS-COV-2 RDRP RESP QL NAA+PROBE: POSITIVE

## 2022-01-10 PROCEDURE — 99215 OFFICE O/P EST HI 40 MIN: CPT | Mod: S$PBB,,, | Performed by: NURSE PRACTITIONER

## 2022-01-10 PROCEDURE — U0002 COVID-19 LAB TEST NON-CDC: HCPCS | Performed by: EMERGENCY MEDICINE

## 2022-01-10 PROCEDURE — 99900041 HC LEFT WITHOUT BEING SEEN- EMERGENCY

## 2022-01-10 PROCEDURE — 99215 PR OFFICE/OUTPT VISIT, EST, LEVL V, 40-54 MIN: ICD-10-PCS | Mod: S$PBB,,, | Performed by: NURSE PRACTITIONER

## 2022-01-10 PROCEDURE — 99900 PR LEFT WITHOUTBEING SEEN-EMERGENCY: CPT | Mod: ,,, | Performed by: EMERGENCY MEDICINE

## 2022-01-10 PROCEDURE — 99900 PR LEFT WITHOUTBEING SEEN-EMERGENCY: ICD-10-PCS | Mod: ,,, | Performed by: EMERGENCY MEDICINE

## 2022-01-10 PROCEDURE — 99999 PR PBB SHADOW E&M-EST. PATIENT-LVL III: CPT | Mod: PBBFAC,,, | Performed by: NURSE PRACTITIONER

## 2022-01-10 PROCEDURE — 99213 OFFICE O/P EST LOW 20 MIN: CPT | Mod: PBBFAC | Performed by: NURSE PRACTITIONER

## 2022-01-10 PROCEDURE — 99999 PR PBB SHADOW E&M-EST. PATIENT-LVL III: ICD-10-PCS | Mod: PBBFAC,,, | Performed by: NURSE PRACTITIONER

## 2022-01-10 RX ORDER — CARVEDILOL 12.5 MG/1
12.5 TABLET ORAL 2 TIMES DAILY WITH MEALS
Qty: 60 TABLET | Refills: 2 | Status: SHIPPED | OUTPATIENT
Start: 2022-01-10 | End: 2022-05-16

## 2022-01-10 NOTE — ED TRIAGE NOTES
Pt had cold symptoms last week, resolved. Got tested because last tested positive this a.m. Pt is kidney transplant recipient (2016)the patient currently symptom free.

## 2022-01-10 NOTE — Clinical Note
January 10, 2022                     [facility] Trans[facility] 1st Fl  [address]  [facility] [address]  Phone: [phone]   Patient: Cj Downing   MR Number: [id number]   YOB: 1960   Date of Visit: 1/10/2022       Dear      Thank you for referring Cj Downing to me for evaluation. Attached you will find relevant portions of my assessment and plan of care.    If you have questions, please do not hesitate to call me. I look forward to following Cj Downing along with you.    Sincerely,    Delphine Amaral, NP    Enclosure    If you would like to receive this communication electronically, please contact [email or web address] or [phone] to request [employer] access.    [employer] is a tool which provides read-only access to select patient information with whom you have a relationship. Its easy to use and provides real time access to review your patients record including encounter summaries, notes, results, and demographic information.    If you feel you have received this communication in error or would no longer like to receive these types of communications, please e-mail [email or web address]

## 2022-01-10 NOTE — PROGRESS NOTES
"   Kidney Post-Transplant Assessment    Referring Physician: Dino Schmidt  Current Nephrologist: Dino Schmidt    ORGAN: RIGHT KIDNEY  Donor Type:  - brain death  PHS Increased Risk: yes  Cold Ischemia: 355 mins  Induction Medications: campath - alemtuzumab (anti-cd52)    Subjective:     CC:  Reassessment of renal allograft function and management of chronic immunosuppression.    HPI:  Mr. Downing is a 61 y.o. year old Black or  male who has a History of ESRD presumed secondary to HTN. He was on dialysis since 3/7/2007,  until receiving PHS increased risk DBD DDRT (Campath induction, CMV D+/R+) on 16 .   He has CKD stage 2 - GFR 60-89 and his baseline creatinine is between 1.2-1.4. He takes tacrolimus for maintenance immunosuppression. No Recent hospitalizations or ER visits since his previous clinic visit.    HX: + BK, in which he was  tx with leflunomide     BK PCR detected/ 194  With 2.29 copies --> MMF was previously held d/t + BK  .       Interval HX:  Following with Dr Thorne general nephrology . LOV 10/6/21  Has been out of BP meds for "months" d/t insurance coverage   Reports tingling fingers /hands bilaterally , which is not a new problem--is f/u with general nephrology regarding this  Also states his girlfriend tested + COVID today, he is currently asymptomatic, feels well and has been vaccinated  Intake   Feels he could drink more water--discussed 2L minimum as a goal  UOP-no problems   BP--   BP Readings from Last 3 Encounters:   01/10/22 (!) 197/114   21 (!) 158/94   10/12/20 (!) 170/95     Peripheral edema-no   Weight stable  Appetite-good      fx assessment --working 2 jobs     Lab /diagnostic results reviewed with patient today.   All questions answered        Past Medical History:   Diagnosis Date    Anemia of chronic renal failure 12/3/2013    Blind right eye     Cataract     CKD (chronic kidney disease) stage 2, GFR 60-89 ml/min 2016    CKD (chronic " "kidney disease) stage 2, GFR 60-89 ml/min 2/24/2016    ESRD (end stage renal disease) 2008    Hypertension     Hypomagnesemia 1/18/2017    Hypophosphatemia 1/18/2017    Latent TB - 4/7/11 - INH x 9 months 12/3/2013    Pulmonary hypertension - PASP 58mmHg (9/13) 12/3/2013    Pulmonary hypertension - PASP 58mmHg (9/13) improved on recent echo PA pressure of 13 12/3/2013    S/P kidney transplant 1/28/2016     Secondary hyperparathyroidism, renal        Review of Systems   Constitutional: Positive for fatigue. Negative for activity change, appetite change, chills, fever and unexpected weight change.   HENT: Negative for congestion, facial swelling, postnasal drip, rhinorrhea, sinus pressure, sore throat and trouble swallowing.    Eyes: Positive for visual disturbance. Negative for pain and redness.        Blind right eye   Respiratory: Negative for cough, chest tightness, shortness of breath and wheezing.    Cardiovascular: Negative.  Negative for chest pain, palpitations and leg swelling.   Gastrointestinal: Negative for abdominal pain, diarrhea, nausea and vomiting.   Genitourinary: Negative for dysuria, flank pain and urgency.   Musculoskeletal: Negative for gait problem, neck pain and neck stiffness.   Skin: Negative for rash.   Allergic/Immunologic: Positive for immunocompromised state. Negative for environmental allergies and food allergies.   Neurological: Negative for dizziness, weakness, light-headedness and headaches.        Paresthesia to hands bilaterally    Psychiatric/Behavioral: Negative for agitation and confusion. The patient is not nervous/anxious.        Objective:     Blood pressure (!) 197/114, pulse 90, temperature 97 °F (36.1 °C), temperature source Temporal, resp. rate 16, height 5' 7" (1.702 m), weight 80.1 kg (176 lb 9.4 oz), SpO2 99 %.body mass index is 27.66 kg/m².    Physical Exam  Vitals reviewed.   Constitutional:       Appearance: Normal appearance. He is well-developed.   HENT: "      Head: Normocephalic.      Mouth/Throat:      Pharynx: No oropharyngeal exudate.   Eyes:      General: No scleral icterus.     Conjunctiva/sclera: Conjunctivae normal.      Pupils: Pupils are equal, round, and reactive to light.   Cardiovascular:      Rate and Rhythm: Normal rate and regular rhythm.      Heart sounds: Normal heart sounds.   Pulmonary:      Effort: Pulmonary effort is normal.      Breath sounds: Normal breath sounds.   Abdominal:      General: Bowel sounds are normal. There is no distension.      Palpations: Abdomen is soft. There is no hepatomegaly, splenomegaly or mass.      Tenderness: There is no abdominal tenderness. There is no guarding or rebound. Negative signs include Roberts's sign and McBurney's sign.       Musculoskeletal:         General: Normal range of motion.        Arms:       Cervical back: Normal range of motion and neck supple.   Lymphadenopathy:      Cervical: No cervical adenopathy.   Skin:     General: Skin is warm and dry.   Neurological:      Mental Status: He is alert and oriented to person, place, and time.      Motor: No abnormal muscle tone.      Coordination: Coordination normal.   Psychiatric:         Behavior: Behavior normal.         Labs:    No results found for: EXTANC, EXTWBC, EXTSEGS, EXTPLATELETS, EXTHEMOGLOBI, EXTHEMATOCRI, EXTCREATININ, EXTSODIUM, EXTPOTASSIUM, EXTBUN, EXTCO2, EXTCALCIUM, EXTPHOSPHORU, EXTGLUCOSE, EXTALBUMIN, EXTAST, EXTALT, EXTBILITOTAL, EXTLIPASE, EXTAMYLASE    No results found for: EXTCYCLOSLVL, EXTSIROLIMUS, EXTTACROLVL, EXTPROTCRE, EXTPTHINTACT, EXTPROTEINUA, EXTWBCUA, EXTRBCUA    Labs were reviewed with the patient.    Assessment:     1. S/P kidney transplant 1/28/2016    2. Essential hypertension    3. Long-term use of immunosuppressant medication    4. CKD (chronic kidney disease) stage 2, GFR 60-89 ml/min    5. Renovascular hypertension    6. Secondary hyperparathyroidism, renal    7. Noncompliance with medications        Plan:        Encouraged to f/u with general nephrology   Prograf dose increased to 3/2   Repeat trough ~ 3 weeks  Instructed to call coordinator/txp team  if he becomes + COVID    Follow-up:   1. CKD stage: 2  stable    2. Immunosuppression:   Prograf Trough 3.0 , which is sub  therapeutic-->   .  Prograf 3/2, MMF  500 mg BID -- , Will continue to monitor for drug toxicities.        3. Allograft Function: Stable.  Continue good po hydration .    Lab Results   Component Value Date    CREATININE 1.3 01/05/2022 1/5/2022  5yr 11mo   eGFR if African American >60 mL/min/1.73 m^2 >60.0         4. Hypertension management:   Coreg   as prescribed -->MGMT deferred to general nephrology  Courtesy 90 day  Refill given, , discussed importance in maintaining good BP control and f.u up with general nephrology for further monitoring /mgmt      5. Metabolic Bone Disease/Secondary Hyperparathyroidism:stable.    Will monitor Vit D, PTH and CA / protocol  -->MGMT deferred to general nephrology    Lab Results   Component Value Date    .0 (H) 03/23/2016    CALCIUM 10.2 01/05/2022    PHOS 2.9 01/05/2022 1/5/2022  5yr 11mo   Magnesium 1.6 - 2.6 mg/dL 1.8       6. Electrolytes: Normal calcium. Bicarbonate is normal -->MGMT deferred to general nephrology    Lab Results   Component Value Date     01/05/2022    K 4.3 01/05/2022     01/05/2022    CO2 25 01/05/2022         7. Anemia: stable. No need for intervention    Lab Results   Component Value Date    WBC 6.22 01/05/2022    HGB 13.0 (L) 01/05/2022    HCT 38.6 (L) 01/05/2022    MCV 86 01/05/2022     01/05/2022       8.  Cytopenias: no significant cytopenias will monitor as per our guidelines. Medicine list reviewed including potential causes of drug-induced cytopenias    9.Proteinuria: continue p/c ratio as per guidelines          1/5/2022  5yr 11mo   Prot/Creat Ratio, Urine 0.00 - 0.20 0.16       10. BK virus infection screening:   will continue to  monitor/ guidelines         11. Weight education: provided during the clinic visit   Body mass index is 27.66 kg/m².       12.Patient safety education regarding immunosuppression including prophylaxis posttransplant for CMV, PCP : Education provided about vaccination and prevention of infections       Follow-up:   Clinic: return to transplant clinic weekly for the first month after transplant; every 2 weeks during months 2-3; then at 6-, 9-, 12-, 18-, 24-, and 36- months post-transplant to reassess for complications from immunosuppression toxicity and monitor for rejection.  Annually thereafter.    Labs: since patient remains at high risk for rejection and drug-related complications that warrant close monitoring, labs will be ordered as follows: continue twice weekly CBC, renal panel, and drug level for first month; then same labs once weekly through 3rd month post-transplant.  Urine for UA and protein/creatinine ratio monthly.  Urine BK - PCR at 1-, 3-, 6-, 9-, 12-, 18-, 24-, and 36- months post-transplant.  Hepatic panel at 1-, 2-, 3-, 6-, 9-, 12-, 18-, 24-, and 36- months post-transplant.    Delphine Amaral NP       Education:   Material provided to the patient.  Patient reminded to call with any health changes since these can be early signs of significant complications.  Also, I advised the patient to be sure any new medications or changes of old medications are discussed with either a pharmacist or physician knowledgeable with transplant to avoid rejection/drug toxicity related to significant drug interactions.    UNOS Patient Status  Functional Status: 80% - Normal activity with effort: some symptoms of disease  Physical Capacity: No Limitations

## 2022-01-10 NOTE — ED PROVIDER NOTES
12:13 PM  Patient left without being seen. Per nurse, he did not want to wait.      Sita Jeronimo PA-C  01/10/22 1216

## 2022-01-10 NOTE — TELEPHONE ENCOUNTER
Writer talked to pt, pt instructed to hold MMF for 2 weeks, pt stated he is asymptomatic and feels okay.  Pt verbalized understanding of holding cellcept for 2 weeks.

## 2022-01-10 NOTE — ED NOTES
Pt left without ingrid seen by provider. Pt had earlier stated he worked last night and was tired, didn't want to wait.

## 2022-01-21 ENCOUNTER — LAB VISIT (OUTPATIENT)
Dept: LAB | Facility: HOSPITAL | Age: 62
End: 2022-01-21
Payer: OTHER GOVERNMENT

## 2022-01-21 DIAGNOSIS — Z94.0 KIDNEY REPLACED BY TRANSPLANT: ICD-10-CM

## 2022-01-21 DIAGNOSIS — Z94.0 S/P KIDNEY TRANSPLANT: ICD-10-CM

## 2022-01-21 DIAGNOSIS — Z79.60 LONG-TERM USE OF IMMUNOSUPPRESSANT MEDICATION: ICD-10-CM

## 2022-01-21 LAB — TACROLIMUS BLD-MCNC: 8.3 NG/ML (ref 5–15)

## 2022-01-21 PROCEDURE — 80197 ASSAY OF TACROLIMUS: CPT | Performed by: NURSE PRACTITIONER

## 2022-01-21 PROCEDURE — 36415 COLL VENOUS BLD VENIPUNCTURE: CPT | Performed by: NURSE PRACTITIONER

## 2022-01-21 RX ORDER — TACROLIMUS 1 MG/1
CAPSULE ORAL
Qty: 120 CAPSULE | Refills: 11 | Status: SHIPPED | OUTPATIENT
Start: 2022-01-21 | End: 2022-12-05

## 2022-01-21 NOTE — TELEPHONE ENCOUNTER
Pt made aware to lower prograf to 2mg BID and will repeat in 2 weeks.            ----- Message from Delphine Amaral NP sent at 1/21/2022 11:59 AM CST -----  Result reviewed, Action needed.   Lower prograf 2/2  Repeat trough ~ 2 weeks

## 2022-01-24 DIAGNOSIS — Z94.0 KIDNEY REPLACED BY TRANSPLANT: Primary | ICD-10-CM

## 2022-01-24 RX ORDER — MYCOPHENOLATE MOFETIL 250 MG/1
500 CAPSULE ORAL 2 TIMES DAILY
Qty: 120 CAPSULE | Refills: 11 | Status: SHIPPED | OUTPATIENT
Start: 2022-01-24 | End: 2022-12-05

## 2022-01-24 NOTE — TELEPHONE ENCOUNTER
Spoke with pt, pt had covid, was asymptomatic, restarted cellcept at 500mg BID  Pt verbalized understanding.

## 2022-02-08 ENCOUNTER — LAB VISIT (OUTPATIENT)
Dept: LAB | Facility: HOSPITAL | Age: 62
End: 2022-02-08
Payer: OTHER GOVERNMENT

## 2022-02-08 DIAGNOSIS — Z94.0 KIDNEY REPLACED BY TRANSPLANT: ICD-10-CM

## 2022-02-08 LAB — TACROLIMUS BLD-MCNC: 4.4 NG/ML (ref 5–15)

## 2022-02-08 PROCEDURE — 80197 ASSAY OF TACROLIMUS: CPT | Performed by: NURSE PRACTITIONER

## 2022-02-08 PROCEDURE — 36415 COLL VENOUS BLD VENIPUNCTURE: CPT | Performed by: NURSE PRACTITIONER

## 2022-05-11 ENCOUNTER — PATIENT MESSAGE (OUTPATIENT)
Dept: RESEARCH | Facility: CLINIC | Age: 62
End: 2022-05-11

## 2022-05-30 ENCOUNTER — PATIENT MESSAGE (OUTPATIENT)
Dept: ADMINISTRATIVE | Facility: HOSPITAL | Age: 62
End: 2022-05-30

## 2022-06-01 ENCOUNTER — HOSPITAL ENCOUNTER (EMERGENCY)
Facility: HOSPITAL | Age: 62
Discharge: HOME | End: 2022-06-01
Attending: EMERGENCY MEDICINE

## 2022-06-01 VITALS
HEIGHT: 67 IN | OXYGEN SATURATION: 99 % | SYSTOLIC BLOOD PRESSURE: 186 MMHG | WEIGHT: 185 LBS | HEART RATE: 85 BPM | DIASTOLIC BLOOD PRESSURE: 109 MMHG | RESPIRATION RATE: 18 BRPM | BODY MASS INDEX: 29.03 KG/M2 | TEMPERATURE: 98 F

## 2022-06-01 DIAGNOSIS — R20.2 PARESTHESIA OF BOTH HANDS: Primary | ICD-10-CM

## 2022-06-01 LAB
ALBUMIN SERPL BCP-MCNC: 4.1 G/DL (ref 3.5–5.2)
ALP SERPL-CCNC: 44 U/L (ref 55–135)
ALT SERPL W/O P-5'-P-CCNC: 10 U/L (ref 10–44)
ANION GAP SERPL CALC-SCNC: 10 MMOL/L (ref 8–16)
AST SERPL-CCNC: 21 U/L (ref 10–40)
BASOPHILS # BLD AUTO: 0.06 K/UL (ref 0–0.2)
BASOPHILS NFR BLD: 1.1 % (ref 0–1.9)
BILIRUB SERPL-MCNC: 0.5 MG/DL (ref 0.1–1)
BUN SERPL-MCNC: 10 MG/DL (ref 8–23)
CALCIUM SERPL-MCNC: 10.1 MG/DL (ref 8.7–10.5)
CHLORIDE SERPL-SCNC: 106 MMOL/L (ref 95–110)
CO2 SERPL-SCNC: 27 MMOL/L (ref 23–29)
CREAT SERPL-MCNC: 1.2 MG/DL (ref 0.5–1.4)
DIFFERENTIAL METHOD: ABNORMAL
EOSINOPHIL # BLD AUTO: 0.2 K/UL (ref 0–0.5)
EOSINOPHIL NFR BLD: 2.7 % (ref 0–8)
ERYTHROCYTE [DISTWIDTH] IN BLOOD BY AUTOMATED COUNT: 14.3 % (ref 11.5–14.5)
EST. GFR  (AFRICAN AMERICAN): >60 ML/MIN/1.73 M^2
EST. GFR  (NON AFRICAN AMERICAN): >60 ML/MIN/1.73 M^2
GLUCOSE SERPL-MCNC: 95 MG/DL (ref 70–110)
HCT VFR BLD AUTO: 38.9 % (ref 40–54)
HGB BLD-MCNC: 13.2 G/DL (ref 14–18)
IMM GRANULOCYTES # BLD AUTO: 0.01 K/UL (ref 0–0.04)
IMM GRANULOCYTES NFR BLD AUTO: 0.2 % (ref 0–0.5)
LYMPHOCYTES # BLD AUTO: 1.3 K/UL (ref 1–4.8)
LYMPHOCYTES NFR BLD: 22.9 % (ref 18–48)
MAGNESIUM SERPL-MCNC: 2.9 MG/DL (ref 1.6–2.6)
MCH RBC QN AUTO: 29.5 PG (ref 27–31)
MCHC RBC AUTO-ENTMCNC: 33.9 G/DL (ref 32–36)
MCV RBC AUTO: 87 FL (ref 82–98)
MONOCYTES # BLD AUTO: 0.6 K/UL (ref 0.3–1)
MONOCYTES NFR BLD: 10.5 % (ref 4–15)
NEUTROPHILS # BLD AUTO: 3.5 K/UL (ref 1.8–7.7)
NEUTROPHILS NFR BLD: 62.6 % (ref 38–73)
NRBC BLD-RTO: 0 /100 WBC
PLATELET # BLD AUTO: 161 K/UL (ref 150–450)
PMV BLD AUTO: 11.2 FL (ref 9.2–12.9)
POCT GLUCOSE: 90 MG/DL (ref 70–110)
POTASSIUM SERPL-SCNC: 4.1 MMOL/L (ref 3.5–5.1)
PROT SERPL-MCNC: 8.1 G/DL (ref 6–8.4)
RBC # BLD AUTO: 4.48 M/UL (ref 4.6–6.2)
SODIUM SERPL-SCNC: 143 MMOL/L (ref 136–145)
TSH SERPL DL<=0.005 MIU/L-ACNC: 1.39 UIU/ML (ref 0.4–4)
WBC # BLD AUTO: 5.63 K/UL (ref 3.9–12.7)

## 2022-06-01 PROCEDURE — 80053 COMPREHEN METABOLIC PANEL: CPT | Performed by: EMERGENCY MEDICINE

## 2022-06-01 PROCEDURE — 84443 ASSAY THYROID STIM HORMONE: CPT | Performed by: EMERGENCY MEDICINE

## 2022-06-01 PROCEDURE — 85025 COMPLETE CBC W/AUTO DIFF WBC: CPT | Performed by: EMERGENCY MEDICINE

## 2022-06-01 PROCEDURE — 99283 EMERGENCY DEPT VISIT LOW MDM: CPT | Mod: 25

## 2022-06-01 PROCEDURE — 82962 GLUCOSE BLOOD TEST: CPT

## 2022-06-01 PROCEDURE — 25000003 PHARM REV CODE 250: Performed by: EMERGENCY MEDICINE

## 2022-06-01 PROCEDURE — 83735 ASSAY OF MAGNESIUM: CPT | Performed by: EMERGENCY MEDICINE

## 2022-06-01 RX ORDER — CARVEDILOL 12.5 MG/1
12.5 TABLET ORAL
Status: COMPLETED | OUTPATIENT
Start: 2022-06-01 | End: 2022-06-01

## 2022-06-01 RX ADMIN — CARVEDILOL 12.5 MG: 12.5 TABLET, FILM COATED ORAL at 01:06

## 2022-06-01 NOTE — LETTER
Patient: Cj Downing  YOB: 1960  Date: 6/1/2022 Time: 2:24 PM  Location: [facility]    Leaving the Hospital Against Medical Advice    Chart #:[id number]    This will certify that I, the undersigned,    ______________________________________________________________________    A patient in the above named medical center, having requested discharge and removal from the medical center against the advice of my attending physician(s), hereby release [facility], its physicians, officers and employees, severally and individually, from any and all liability of any nature whatsoever for any injury or harm or complication of any kind that may result directly or indirectly, by reason of my terminating my stay as a patient at [facility] and my departure from [facility], and hereby waive any and all rights of action I may now have or later acquire as a result of my voluntary departure from [facility] and the termination of my stay as a patient therein.    This release is made with the full knowledge of the danger that may result from the action which I am taking.      Date:_______________________                         ___________________________                                                                                    Patient/Legal Representative    Witness:        ____________________________                          ___________________________  Nurse                                                                        Physician

## 2022-06-01 NOTE — ED PROVIDER NOTES
Encounter Date: 6/1/2022    -------------------------------------------------------------------------------------------------------  Sami COOPER, have reviewed the SORT/triage note.      History     Chief Complaint   Patient presents with    Tingling     The patient reports tingling to fingers on bilateral hands for about 6 months. Denies headache, changes in vision, weakness, dizziness, difficulty speaking. Patient states that he has been out of his BP medication for about 1 week now.        HPI: 61 y.o. RHD male PMHx blind in right eye, HTN, CKD s/p transplant presents with bilateral hand tingling x 6 months. Concern became worse in the last 24 hours. Patient states it is at the tip of his fingers and constant. Discomfort more prominent in left hand. Patient denies trauma, fevers, swelling or rash.      Past Medical History:   Diagnosis Date    Anemia of chronic renal failure 12/3/2013    Blind right eye     Cataract     CKD (chronic kidney disease) stage 2, GFR 60-89 ml/min 2/24/2016    CKD (chronic kidney disease) stage 2, GFR 60-89 ml/min 2/24/2016    ESRD (end stage renal disease) 2008    Hypertension     Hypomagnesemia 1/18/2017    Hypophosphatemia 1/18/2017    Latent TB - 4/7/11 - INH x 9 months 12/3/2013    Pulmonary hypertension - PASP 58mmHg (9/13) 12/3/2013    Pulmonary hypertension - PASP 58mmHg (9/13) improved on recent echo PA pressure of 13 12/3/2013    S/P kidney transplant 1/28/2016     Secondary hyperparathyroidism, renal      Past Surgical History:   Procedure Laterality Date    APPENDECTOMY      AV FISTULA PLACEMENT      EUSEBIO    COLONOSCOPY      CORNEAL TRANSPLANT  01/01/1997    LEFT EYE    CORNEAL TRANSPLANT  01/01/2010    RIGHT EYE    CORNEAL TRANSPLANT  01/01/2000    LEFT EYE    EYE SURGERY      KIDNEY TRANSPLANT  01/28/2016    TONSILLECTOMY       Social History     Tobacco Use    Smoking status: Never Smoker    Smokeless tobacco: Never Used   Substance Use  "Topics    Alcohol use: Yes     Comment: beer rarely    Drug use: No     Family History   Problem Relation Age of Onset    Hypertension Mother     Cancer Maternal Aunt     Kidney disease Maternal Aunt     Amblyopia Neg Hx     Blindness Neg Hx     Cataracts Neg Hx     Diabetes Neg Hx     Glaucoma Neg Hx     Macular degeneration Neg Hx     Retinal detachment Neg Hx     Strabismus Neg Hx     Stroke Neg Hx     Thyroid disease Neg Hx      Review of patient's allergies indicates:  No Known Allergies    Current Outpatient Medications:     carvediloL (COREG) 12.5 MG tablet, Take 1 tablet (12.5 mg total) by mouth 2 (two) times daily., Disp: 180 tablet, Rfl: 0    multivitamin (THERAGRAN) tablet, Take 1 tablet by mouth once daily., Disp: 30 tablet, Rfl: 2    mycophenolate (CELLCEPT) 250 mg Cap, Take 2 capsules (500 mg total) by mouth 2 (two) times daily., Disp: 120 capsule, Rfl: 11    tacrolimus (PROGRAF) 1 MG Cap, Take 2 capsules (2 mg total) by mouth every morning AND 2 capsules (2 mg total) every evening., Disp: 120 capsule, Rfl: 11       Review of Systems as per HPI  Review of Systems   Constitutional: Negative for chills, diaphoresis, fever and malaise/fatigue.   Gastrointestinal: Negative for abdominal pain, diarrhea, nausea and vomiting.   Musculoskeletal: Negative for back pain, falls, joint pain and neck pain.   Skin: Negative.    Neurological: Positive for tingling. Negative for dizziness, tremors, sensory change, speech change, focal weakness, seizures, loss of consciousness, weakness and headaches.   All other systems reviewed and are negative.      Physical Exam     ED Triage Vitals [06/01/22 1302]   Enc Vitals Group      BP (!) 186/109      Pulse 85      Resp 18      Temp 98.4 °F (36.9 °C)      Temp src Oral      SpO2 99 %      Weight 185 lb      Height 5' 7"      Head Circumference       Peak Flow       Pain Score       Pain Loc       Pain Edu?       Excl. in GC?      PHYSICAL " EXAMINATION:  Vital signs and nursing assessment noted: elevated bp    GEN:   NAD, A & Ox3, atraumatic, well appearing, nontoxic appearing  HEENT:  PERRLA, EOMI, moist membranes, nl conjunctiva, no scleral icterus, no nystagmus, no nodes/nodules, soft, supple, FROM, no trachial deviation  CV:   2+ radial pulses, <2sec cap refill, no obvious JVD  RESP:  No obvious wheezing or stridor, equal and bilateral chest rise, no respiratory distress  ABD:   soft, Nontender, Nondistended, no guarding/rebound  :   Deferred  LYMPH:  no gross adenopathy  EXT:   FROM, KLEIN x 4, no edema, no calf tenderness, no swelling, no bony tenderness, no warmth or redness, no crepitus, no obvious deformity  Physical Exam  Musculoskeletal:      Right wrist: Normal.      Left wrist: Normal.      Right hand: No swelling, deformity, lacerations, tenderness or bony tenderness. Normal range of motion. Normal strength. Normal sensation. There is no disruption of two-point discrimination. Normal capillary refill. Normal pulse.      Left hand: No swelling, deformity, lacerations, tenderness or bony tenderness. Normal range of motion. Normal strength. Normal sensation. There is no disruption of two-point discrimination. Normal capillary refill. Normal pulse.   Neurological:      General: No focal deficit present.      GCS: GCS eye subscore is 4. GCS verbal subscore is 5. GCS motor subscore is 6.      Cranial Nerves: Cranial nerves are intact.      Sensory: Sensation is intact.      Motor: Motor function is intact. No weakness, tremor, atrophy, abnormal muscle tone, seizure activity or pronator drift.      Coordination: Coordination is intact.     PSYCH:   no SI/HI, no anxiety, nl mood/affect, nl judgement/thought process  SKIN:  Warm, dry, intact, no rashes/lesions or masses, nl color, no pallor       Tests     Labs Reviewed   COMPREHENSIVE METABOLIC PANEL - Abnormal; Notable for the following components:       Result Value    Alkaline Phosphatase 44  (*)     All other components within normal limits   CBC W/ AUTO DIFFERENTIAL - Abnormal; Notable for the following components:    RBC 4.48 (*)     Hemoglobin 13.2 (*)     Hematocrit 38.9 (*)     All other components within normal limits   MAGNESIUM - Abnormal; Notable for the following components:    Magnesium 2.9 (*)     All other components within normal limits   TSH   POCT GLUCOSE     No orders to display     PROCEDURE(S):  NONE      MEDICAL DECISION MAKING:  History supplemented by old records which were checked/reviewed in EMR: Patient evaluated by   61 y.o. male PMHx HTN, CKD s/p kidney transplant presents with bilateral hand tingling x 6 months with concern occurring in the last 24 hours. Exam is relatively benign.    Differential includes but not exclusive to:  Carpal tunnel, Peripheral neuropathy, malingering, hypothyroidism, metabolic disturbances, medication side effect. Unlikely UTI, myocardial infarction, dehydration, CVA, fracture, poisoning,    Treatment plan includes history, physical exam, cardiac monitoring, labs,  and supportive care.     Patient provided a quiet room in which to rest. This may produce a fall in blood pressure =20/10 mmHg in approximately one-third of adults. If this is not effective, antihypertensive drugs may be given.    Goal is to lower the blood pressure to <160/<100 mmHg or to a level that is no more than 25 to 30 percent lower than the baseline blood pressure.  Thus, the short-term blood pressure target may need to be above 160/100 mmHg in patients who present with very high pressures, because cerebral or myocardial ischemia or infarction, or acute kidney injury, can be induced by rapid and aggressive antihypertensive therapy if the blood pressure falls below the range at which tissue perfusion can be maintained by autoregulation.  In the long-term as an outpatient, the blood pressure can then be reduced further.            ED Course    The results of physical exam findings  were reviewed with the patient. This discussion included but not exclusive to the risk to the patient due to the potential underlying pathology, the testing that was required to make the diagnosis, and the treatment administered or prescribed.     MDM  Reviewed: previous chart, nursing note and vitals  Reviewed previous: labs  Interpretation: labs (unremarkable BMP, TSH)      ED Course as of 06/02/22 1737 Wed Jun 01, 2022   1410 POCT Glucose: 90  unremarkable [NO]   1411 Hemoglobin(!): 13.2  low [NO]   1427 Per report, patient left prior to completion of work up stating he had a delivery arriving at home and had to leave. [NO]      ED Course User Index  [NO] Sami Lorenzo MD       REASSESSMENT: Patient is tolerating p.o. and ambulating with steady gait.   Sx improved after treatment with:   Medications   carvediloL tablet 12.5 mg (12.5 mg Oral Given 6/1/22 1335)        Clinical Impression:   Final diagnoses:  [R20.2] Paresthesia of both hands (Primary)          ED Disposition Condition    KIRA Lorenzo MD  06/02/22 9707

## 2022-06-01 NOTE — ED TRIAGE NOTES
Patient presents to ED via personal vehicle for complaints of bilateral hand tingling. Reports has been ongoing x's 6 months and does not come and go but is constant.

## 2022-06-24 ENCOUNTER — TELEPHONE (OUTPATIENT)
Dept: TRANSPLANT | Facility: CLINIC | Age: 62
End: 2022-06-24

## 2022-06-30 ENCOUNTER — PATIENT MESSAGE (OUTPATIENT)
Dept: TRANSPLANT | Facility: CLINIC | Age: 62
End: 2022-06-30

## 2022-07-11 ENCOUNTER — HOSPITAL ENCOUNTER (EMERGENCY)
Facility: HOSPITAL | Age: 62
Discharge: HOME | End: 2022-07-11
Attending: EMERGENCY MEDICINE

## 2022-07-11 VITALS
HEART RATE: 72 BPM | WEIGHT: 185 LBS | TEMPERATURE: 98 F | SYSTOLIC BLOOD PRESSURE: 161 MMHG | OXYGEN SATURATION: 98 % | DIASTOLIC BLOOD PRESSURE: 89 MMHG | HEIGHT: 67 IN | RESPIRATION RATE: 18 BRPM | BODY MASS INDEX: 29.03 KG/M2

## 2022-07-11 DIAGNOSIS — I10 ESSENTIAL HYPERTENSION: ICD-10-CM

## 2022-07-11 DIAGNOSIS — Z76.0 ENCOUNTER FOR MEDICATION REFILL: Primary | ICD-10-CM

## 2022-07-11 PROCEDURE — 99283 EMERGENCY DEPT VISIT LOW MDM: CPT

## 2022-07-11 RX ORDER — CARVEDILOL 12.5 MG/1
12.5 TABLET ORAL 2 TIMES DAILY
Qty: 180 TABLET | Refills: 1 | Status: SHIPPED | OUTPATIENT
Start: 2022-07-11 | End: 2023-11-01 | Stop reason: SDUPTHER

## 2022-07-11 NOTE — FIRST PROVIDER EVALUATION
"Medical screening exam completed.  I have conducted a focused provider triage encounter, findings are as follows:    Brief history of present illness:   60 y/o M with HTN out of labetalol (unsure dosage) for 1 week presenting for evaluation of HAs.   Deniesconfusion, gait or speech difficulty.  Reports chronic tingling in both handfor months. denie neck pain.   hehasnot been checking BP at home.   Denies CP, SOB dizziness, lightheadedness.   No HA currently    requsting refill of med and pcp appointment       Vitals:    07/11/22 1309   BP: (!) 153/87   BP Location: Right arm   Patient Position: Sitting   Pulse: 83   Resp: 18   Temp: 98.2 °F (36.8 °C)   TempSrc: Oral   SpO2: 98%   Weight: 83.9 kg (185 lb)   Height: 5' 7" (1.702 m)       Pertinent physical exam:  /87      Brief workup plan:  Further evaluatio n  Preliminary workup initiated; this workup will be continued and followed by the physician or advanced practice provider that is assigned to the patient when roomed.  "

## 2022-07-11 NOTE — DISCHARGE INSTRUCTIONS

## 2022-07-12 NOTE — ED PROVIDER NOTES
"Encounter Date: 7/11/2022       History     Chief Complaint   Patient presents with    Headache     Pt to ER with c/o headache x 1 week and bilateral hand tingling x "months" Pt reports has been out of his B/P medication x few weeks. Pt with hx of kidney transplant      Chief complaint:  Headache    HPI:    60 y/o male with history hypertension, ESRD status post renal transplant in 2016 on Prograf and CellCept presenting requesting refill of his carvedilol.  Patient has been out of his medication for the past week.  He reports he has been having intermittent frontal headache since running out of his blood pressure medication.  He has not been checking his blood pressure at home.  He denies any chest pain, shortness breath, dizziness, lightheadedness, visual disturbance, weakness, numbness, confusion, gait or speech difficulty.  Patient has chronic tingling to his bilateral hands.  This is not worsening.  Denies URI symptoms or headache at this time.        Review of patient's allergies indicates:  No Known Allergies  Past Medical History:   Diagnosis Date    Anemia of chronic renal failure 12/3/2013    Blind right eye     Cataract     CKD (chronic kidney disease) stage 2, GFR 60-89 ml/min 2/24/2016    CKD (chronic kidney disease) stage 2, GFR 60-89 ml/min 2/24/2016    ESRD (end stage renal disease) 2008    Hypertension     Hypomagnesemia 1/18/2017    Hypophosphatemia 1/18/2017    Latent TB - 4/7/11 - INH x 9 months 12/3/2013    Pulmonary hypertension - PASP 58mmHg (9/13) 12/3/2013    Pulmonary hypertension - PASP 58mmHg (9/13) improved on recent echo PA pressure of 13 12/3/2013    S/P kidney transplant 1/28/2016     Secondary hyperparathyroidism, renal      Past Surgical History:   Procedure Laterality Date    APPENDECTOMY      AV FISTULA PLACEMENT      EUSEBIO    COLONOSCOPY      CORNEAL TRANSPLANT  01/01/1997    LEFT EYE    CORNEAL TRANSPLANT  01/01/2010    RIGHT EYE    CORNEAL TRANSPLANT  " 01/01/2000    LEFT EYE    EYE SURGERY      KIDNEY TRANSPLANT  01/28/2016    TONSILLECTOMY       Family History   Problem Relation Age of Onset    Hypertension Mother     Cancer Maternal Aunt     Kidney disease Maternal Aunt     Amblyopia Neg Hx     Blindness Neg Hx     Cataracts Neg Hx     Diabetes Neg Hx     Glaucoma Neg Hx     Macular degeneration Neg Hx     Retinal detachment Neg Hx     Strabismus Neg Hx     Stroke Neg Hx     Thyroid disease Neg Hx      Social History     Tobacco Use    Smoking status: Never Smoker    Smokeless tobacco: Never Used   Substance Use Topics    Alcohol use: Yes     Comment: beer rarely    Drug use: No     Review of Systems   Constitutional: Negative for chills and fever.   HENT: Negative for congestion, ear pain, rhinorrhea and sore throat.    Eyes: Negative for redness.   Respiratory: Negative for shortness of breath and stridor.    Cardiovascular: Negative for chest pain.   Gastrointestinal: Negative for abdominal pain, constipation, diarrhea, nausea and vomiting.   Genitourinary: Negative for dysuria, frequency, hematuria and urgency.   Musculoskeletal: Negative for back pain and neck pain.   Skin: Negative for rash.   Neurological: Positive for headaches. Negative for dizziness, speech difficulty, weakness, light-headedness and numbness.   Hematological: Does not bruise/bleed easily.   Psychiatric/Behavioral: Negative for confusion.       Physical Exam     Initial Vitals [07/11/22 1309]   BP Pulse Resp Temp SpO2   (!) 153/87 83 18 98.2 °F (36.8 °C) 98 %      MAP       --         Physical Exam    Nursing note and vitals reviewed.  Constitutional: He appears well-developed and well-nourished.  Non-toxic appearance. He does not have a sickly appearance. No distress.   HENT:   Head: Normocephalic.   Right Ear: External ear normal.   Left Ear: External ear normal.   Mouth/Throat: No trismus in the jaw.   Eyes: EOM are normal.   Neck: No tracheal deviation present.    Normal range of motion.  Cardiovascular: Normal rate and regular rhythm. Exam reveals no gallop and no friction rub.    No murmur heard.  Pulmonary/Chest: No tachypnea and no bradypnea. No respiratory distress. He has no wheezes. He has no rhonchi. He has no rales.   Abdominal: Abdomen is soft. He exhibits no distension. There is no abdominal tenderness. There is no rebound and no guarding.   Musculoskeletal:      Cervical back: Normal range of motion.     Neurological: He is alert and oriented to person, place, and time. He has normal strength. No cranial nerve deficit or sensory deficit. Coordination and gait normal. GCS eye subscore is 4. GCS verbal subscore is 5. GCS motor subscore is 6.   Skin: Skin is warm and dry. No rash noted.   No lower extremity edema   Psychiatric: He has a normal mood and affect. His behavior is normal. Judgment and thought content normal.         ED Course   Procedures  Labs Reviewed - No data to display       Imaging Results    None          Medications - No data to display  Medical Decision Making:   ED Management:  61-year-old male presenting for medication refill.  Requesting refill of carvedilol.  He has been having intermittent headaches.  No headache at this time.  No focal neurologic deficits.  Vital stable.  Offered dose of carvedilol in the emergency department the patient states he would like to fill the prescription at discharge.  No chest pain shortness of breath, dizziness, lightheadedness.  He reports he was previously being seen by primary care at [facility] however is self pay and was instructed to follow up with PCP. Will fax referral to [facility]. Internal referral also sent. He has cellcept and prograf. Will have him return to ER for wrosening symptoms or as needed                      Clinical Impression:   Final diagnoses:  [Z76.0] Encounter for medication refill (Primary)          ED Disposition Condition    Discharge Stable        ED Prescriptions     Medication  Sig Dispense Start Date End Date Auth. Provider    carvediloL (COREG) 12.5 MG tablet Take 1 tablet (12.5 mg total) by mouth 2 (two) times daily. 180 tablet 7/11/2022 10/9/2022 Leida Lopez PA-C        Follow-up Information     Follow up With Specialties Details Why Contact Info    [facility]    [address]  [facility] [address]  [phone]      [facility]    [address] [facility] [address]  [phone]      [facility] Surgical Oncology, Orthopedic Surgery, Genetics, Physical Medicine and Rehabilitation, Occupational Therapy, Radiology Schedule an appointment as soon as possible for a visit in 2 days for follow up [address] [facility] [address]  [phone]      [facility] [place] Emergency Dept Emergency Medicine Go to  As needed, If symptoms worsen [address] [facility] [id number] [phone]           Leida Lopez PA-C  07/11/22 1909

## 2022-09-16 ENCOUNTER — PATIENT OUTREACH (OUTPATIENT)
Dept: ADMINISTRATIVE | Facility: HOSPITAL | Age: 62
End: 2022-09-16

## 2023-02-14 ENCOUNTER — PATIENT OUTREACH (OUTPATIENT)
Dept: ADMINISTRATIVE | Facility: HOSPITAL | Age: 63
End: 2023-02-14

## 2023-06-09 DIAGNOSIS — N18.2 CKD (CHRONIC KIDNEY DISEASE) STAGE 2, GFR 60-89 ML/MIN: ICD-10-CM

## 2023-06-12 ENCOUNTER — PATIENT MESSAGE (OUTPATIENT)
Dept: ADMINISTRATIVE | Facility: HOSPITAL | Age: 63
End: 2023-06-12

## 2023-09-05 ENCOUNTER — TELEPHONE (OUTPATIENT)
Dept: FAMILY MEDICINE | Facility: CLINIC | Age: 63
End: 2023-09-05

## 2023-09-05 DIAGNOSIS — Z94.0 KIDNEY REPLACED BY TRANSPLANT: Primary | ICD-10-CM

## 2023-09-05 DIAGNOSIS — Z94.0 S/P KIDNEY TRANSPLANT: ICD-10-CM

## 2023-09-05 DIAGNOSIS — Z79.60 LONG-TERM USE OF IMMUNOSUPPRESSANT MEDICATION: ICD-10-CM

## 2023-09-05 RX ORDER — TACROLIMUS 1 MG/1
2 CAPSULE ORAL EVERY 12 HOURS
Qty: 120 CAPSULE | Refills: 11 | Status: SHIPPED | OUTPATIENT
Start: 2023-09-05 | End: 2024-09-05

## 2023-09-05 RX ORDER — MYCOPHENOLATE MOFETIL 250 MG/1
500 CAPSULE ORAL 2 TIMES DAILY
Qty: 120 CAPSULE | Refills: 11 | Status: SHIPPED | OUTPATIENT
Start: 2023-09-05 | End: 2024-09-05

## 2023-09-05 NOTE — TELEPHONE ENCOUNTER
Pt needing refills of MMF and prograf, pt being set up with gen nephrologist        ----- Message from Teresa Byrd sent at 9/5/2023  3:23 PM CDT -----  Regarding: Speak to staff  Contact: Cj  Regarding: Speak to staff        Name Of Caller: Cj        Contact Preference: [phone] (home)          Nature of call:  pt is calling to speak to staff regarding getting assistance with having the following medications filled, Please advise.    mycophenolate (CELLCEPT) 250 mg Cap    tacrolimus (PROGRAF) 1 MG Cap    carvediloL (COREG) 12.5 MG tablet

## 2023-09-05 NOTE — TELEPHONE ENCOUNTER
Informed pt he will need to get his refills through the transplant team; I scheduled appt with Dr Tang for 9/19

## 2023-09-14 ENCOUNTER — TELEPHONE (OUTPATIENT)
Dept: NEPHROLOGY | Facility: CLINIC | Age: 63
End: 2023-09-14

## 2023-09-14 DIAGNOSIS — Z94.0 S/P KIDNEY TRANSPLANT: Primary | ICD-10-CM

## 2023-09-14 NOTE — TELEPHONE ENCOUNTER
----- Message from Camille Patel LPN sent at 9/12/2023  9:56 AM CDT -----  Regarding: FW: Scheduling an Appt  Zarm pt.  ----- Message -----  From: Jose Antonio Ken MA  Sent: 9/11/2023  11:33 AM CDT  To: [facility] Nephro Clinical Staff  Subject: FW: Scheduling an Appt                           Hello,    I am looking to get the status of getting this patient scheduled.    LLOYD Brady  ----- Message -----  From: Jose Antonio Ken MA  Sent: 9/5/2023   3:56 PM CDT  To: [facility] Nephro Clinical Staff  Subject: Scheduling an Appt                               Hello,    I am needing to see about getting the above patient scheduled for CKD Management. Referral is placed in Epic.    ThanksJose Antonio MA

## 2023-10-25 DIAGNOSIS — Z94.0 S/P KIDNEY TRANSPLANT: Primary | ICD-10-CM

## 2023-11-01 DIAGNOSIS — I10 ESSENTIAL HYPERTENSION: ICD-10-CM

## 2023-11-01 RX ORDER — CARVEDILOL 12.5 MG/1
12.5 TABLET ORAL 2 TIMES DAILY
Qty: 180 TABLET | Refills: 0 | Status: SHIPPED | OUTPATIENT
Start: 2023-11-01 | End: 2024-01-30

## 2024-05-01 ENCOUNTER — HOSPITAL ENCOUNTER (INPATIENT)
Facility: HOSPITAL | Age: 64
LOS: 2 days | Discharge: HOME | DRG: 699 | End: 2024-05-03
Attending: EMERGENCY MEDICINE | Admitting: EMERGENCY MEDICINE
Payer: MEDICAID

## 2024-05-01 DIAGNOSIS — Z94.0 KIDNEY REPLACED BY TRANSPLANT: ICD-10-CM

## 2024-05-01 DIAGNOSIS — D84.9 IMMUNOSUPPRESSION: Primary | ICD-10-CM

## 2024-05-01 DIAGNOSIS — R07.9 CHEST PAIN: ICD-10-CM

## 2024-05-01 DIAGNOSIS — A41.9 SEPSIS: ICD-10-CM

## 2024-05-01 DIAGNOSIS — N28.89 RENAL MASS, RIGHT: ICD-10-CM

## 2024-05-01 DIAGNOSIS — I10 ESSENTIAL HYPERTENSION: ICD-10-CM

## 2024-05-01 PROBLEM — R10.12 LUQ ABDOMINAL PAIN: Status: ACTIVE | Noted: 2024-05-01

## 2024-05-01 PROBLEM — R65.10 SIRS (SYSTEMIC INFLAMMATORY RESPONSE SYNDROME): Status: ACTIVE | Noted: 2024-05-01

## 2024-05-01 LAB
ALBUMIN SERPL BCP-MCNC: 3.6 G/DL (ref 3.5–5.2)
ALLENS TEST: ABNORMAL
ALLENS TEST: NORMAL
ALP SERPL-CCNC: 50 U/L (ref 55–135)
ALT SERPL W/O P-5'-P-CCNC: 23 U/L (ref 10–44)
ANION GAP SERPL CALC-SCNC: 10 MMOL/L (ref 8–16)
APTT PPP: 29.2 SEC (ref 21–32)
AST SERPL-CCNC: 27 U/L (ref 10–40)
BACTERIA #/AREA URNS AUTO: ABNORMAL /HPF
BASOPHILS # BLD AUTO: 0.05 K/UL (ref 0–0.2)
BASOPHILS NFR BLD: 0.4 % (ref 0–1.9)
BILIRUB SERPL-MCNC: 0.7 MG/DL (ref 0.1–1)
BILIRUB UR QL STRIP: NEGATIVE
BNP SERPL-MCNC: 32 PG/ML (ref 0–99)
BUN SERPL-MCNC: 18 MG/DL (ref 8–23)
CALCIUM SERPL-MCNC: 10.8 MG/DL (ref 8.7–10.5)
CHLORIDE SERPL-SCNC: 101 MMOL/L (ref 95–110)
CLARITY UR REFRACT.AUTO: CLEAR
CO2 SERPL-SCNC: 25 MMOL/L (ref 23–29)
COLOR UR AUTO: YELLOW
CREAT SERPL-MCNC: 1.5 MG/DL (ref 0.5–1.4)
DIFFERENTIAL METHOD BLD: ABNORMAL
EOSINOPHIL # BLD AUTO: 0 K/UL (ref 0–0.5)
EOSINOPHIL NFR BLD: 0 % (ref 0–8)
ERYTHROCYTE [DISTWIDTH] IN BLOOD BY AUTOMATED COUNT: 13.7 % (ref 11.5–14.5)
EST. GFR  (NO RACE VARIABLE): 51.7 ML/MIN/1.73 M^2
GLUCOSE SERPL-MCNC: 90 MG/DL (ref 70–110)
GLUCOSE UR QL STRIP: NEGATIVE
HCO3 UR-SCNC: 25.5 MMOL/L (ref 24–28)
HCT VFR BLD AUTO: 40.1 % (ref 40–54)
HCV AB SERPL QL IA: NORMAL
HGB BLD-MCNC: 13.6 G/DL (ref 14–18)
HGB UR QL STRIP: ABNORMAL
HIV 1+2 AB+HIV1 P24 AG SERPL QL IA: NORMAL
HYALINE CASTS UR QL AUTO: 0 /LPF
IMM GRANULOCYTES # BLD AUTO: 0.05 K/UL (ref 0–0.04)
IMM GRANULOCYTES NFR BLD AUTO: 0.4 % (ref 0–0.5)
INFLUENZA A, MOLECULAR: NEGATIVE
INFLUENZA B, MOLECULAR: NEGATIVE
KETONES UR QL STRIP: NEGATIVE
LACTATE SERPL-SCNC: 1.5 MMOL/L (ref 0.5–2.2)
LDH SERPL L TO P-CCNC: 1.25 MMOL/L (ref 0.5–2.2)
LEUKOCYTE ESTERASE UR QL STRIP: NEGATIVE
LIPASE SERPL-CCNC: 29 U/L (ref 4–60)
LYMPHOCYTES # BLD AUTO: 0.8 K/UL (ref 1–4.8)
LYMPHOCYTES NFR BLD: 5.8 % (ref 18–48)
MCH RBC QN AUTO: 29 PG (ref 27–31)
MCHC RBC AUTO-ENTMCNC: 33.9 G/DL (ref 32–36)
MCV RBC AUTO: 86 FL (ref 82–98)
MICROSCOPIC COMMENT: ABNORMAL
MONOCYTES # BLD AUTO: 2 K/UL (ref 0.3–1)
MONOCYTES NFR BLD: 14.7 % (ref 4–15)
NEUTROPHILS # BLD AUTO: 10.6 K/UL (ref 1.8–7.7)
NEUTROPHILS NFR BLD: 78.7 % (ref 38–73)
NITRITE UR QL STRIP: NEGATIVE
NRBC BLD-RTO: 0 /100 WBC
OHS QRS DURATION: 78 MS
OHS QTC CALCULATION: 436 MS
PCO2 BLDA: 41.4 MMHG (ref 35–45)
PH SMN: 7.4 [PH] (ref 7.35–7.45)
PH UR STRIP: 5 [PH] (ref 5–8)
PLATELET # BLD AUTO: 137 K/UL (ref 150–450)
PMV BLD AUTO: 11.3 FL (ref 9.2–12.9)
PO2 BLDA: 17 MMHG (ref 40–60)
POC BE: 1 MMOL/L
POC SATURATED O2: 24 % (ref 95–100)
POC TCO2: 27 MMOL/L (ref 24–29)
POTASSIUM SERPL-SCNC: 3.6 MMOL/L (ref 3.5–5.1)
PROCALCITONIN SERPL IA-MCNC: 0.52 NG/ML
PROT SERPL-MCNC: 8.7 G/DL (ref 6–8.4)
PROT UR QL STRIP: ABNORMAL
RBC # BLD AUTO: 4.69 M/UL (ref 4.6–6.2)
RBC #/AREA URNS AUTO: 2 /HPF (ref 0–4)
SAMPLE: ABNORMAL
SAMPLE: NORMAL
SITE: ABNORMAL
SITE: NORMAL
SODIUM SERPL-SCNC: 136 MMOL/L (ref 136–145)
SP GR UR STRIP: 1.02 (ref 1–1.03)
SPECIMEN SOURCE: NORMAL
SQUAMOUS #/AREA URNS AUTO: 1 /HPF
TROPONIN I SERPL DL<=0.01 NG/ML-MCNC: 0.03 NG/ML (ref 0–0.03)
URN SPEC COLLECT METH UR: ABNORMAL
WBC # BLD AUTO: 13.51 K/UL (ref 3.9–12.7)
WBC #/AREA URNS AUTO: 9 /HPF (ref 0–5)

## 2024-05-01 PROCEDURE — 25000003 PHARM REV CODE 250

## 2024-05-01 PROCEDURE — 82803 BLOOD GASES ANY COMBINATION: CPT

## 2024-05-01 PROCEDURE — 93010 ELECTROCARDIOGRAM REPORT: CPT | Mod: ,,, | Performed by: INTERNAL MEDICINE

## 2024-05-01 PROCEDURE — 83690 ASSAY OF LIPASE: CPT | Performed by: EMERGENCY MEDICINE

## 2024-05-01 PROCEDURE — 86803 HEPATITIS C AB TEST: CPT | Performed by: PHYSICIAN ASSISTANT

## 2024-05-01 PROCEDURE — 87502 INFLUENZA DNA AMP PROBE: CPT | Performed by: EMERGENCY MEDICINE

## 2024-05-01 PROCEDURE — 83605 ASSAY OF LACTIC ACID: CPT

## 2024-05-01 PROCEDURE — 80180 DRUG SCRN QUAN MYCOPHENOLATE: CPT | Performed by: EMERGENCY MEDICINE

## 2024-05-01 PROCEDURE — 12000002 HC ACUTE/MED SURGE SEMI-PRIVATE ROOM

## 2024-05-01 PROCEDURE — 63600175 PHARM REV CODE 636 W HCPCS: Performed by: EMERGENCY MEDICINE

## 2024-05-01 PROCEDURE — 85730 THROMBOPLASTIN TIME PARTIAL: CPT | Performed by: EMERGENCY MEDICINE

## 2024-05-01 PROCEDURE — 99900035 HC TECH TIME PER 15 MIN (STAT)

## 2024-05-01 PROCEDURE — 87040 BLOOD CULTURE FOR BACTERIA: CPT | Mod: 59 | Performed by: EMERGENCY MEDICINE

## 2024-05-01 PROCEDURE — 81001 URINALYSIS AUTO W/SCOPE: CPT | Performed by: EMERGENCY MEDICINE

## 2024-05-01 PROCEDURE — 63600175 PHARM REV CODE 636 W HCPCS: Mod: JZ,JG | Performed by: STUDENT IN AN ORGANIZED HEALTH CARE EDUCATION/TRAINING PROGRAM

## 2024-05-01 PROCEDURE — 84484 ASSAY OF TROPONIN QUANT: CPT | Performed by: EMERGENCY MEDICINE

## 2024-05-01 PROCEDURE — 96365 THER/PROPH/DIAG IV INF INIT: CPT

## 2024-05-01 PROCEDURE — 96375 TX/PRO/DX INJ NEW DRUG ADDON: CPT

## 2024-05-01 PROCEDURE — 25000003 PHARM REV CODE 250: Performed by: STUDENT IN AN ORGANIZED HEALTH CARE EDUCATION/TRAINING PROGRAM

## 2024-05-01 PROCEDURE — 99285 EMERGENCY DEPT VISIT HI MDM: CPT | Mod: 25

## 2024-05-01 PROCEDURE — 83880 ASSAY OF NATRIURETIC PEPTIDE: CPT | Performed by: EMERGENCY MEDICINE

## 2024-05-01 PROCEDURE — 25500020 PHARM REV CODE 255: Performed by: HOSPITALIST

## 2024-05-01 PROCEDURE — 25000003 PHARM REV CODE 250: Performed by: EMERGENCY MEDICINE

## 2024-05-01 PROCEDURE — 80197 ASSAY OF TACROLIMUS: CPT | Performed by: EMERGENCY MEDICINE

## 2024-05-01 PROCEDURE — 80053 COMPREHEN METABOLIC PANEL: CPT | Performed by: EMERGENCY MEDICINE

## 2024-05-01 PROCEDURE — 87389 HIV-1 AG W/HIV-1&-2 AB AG IA: CPT | Performed by: PHYSICIAN ASSISTANT

## 2024-05-01 PROCEDURE — 83605 ASSAY OF LACTIC ACID: CPT | Performed by: EMERGENCY MEDICINE

## 2024-05-01 PROCEDURE — 84145 PROCALCITONIN (PCT): CPT | Performed by: EMERGENCY MEDICINE

## 2024-05-01 PROCEDURE — 93005 ELECTROCARDIOGRAM TRACING: CPT

## 2024-05-01 PROCEDURE — 85025 COMPLETE CBC W/AUTO DIFF WBC: CPT | Performed by: EMERGENCY MEDICINE

## 2024-05-01 RX ORDER — ACETAMINOPHEN 325 MG/1
650 TABLET ORAL
Status: COMPLETED | OUTPATIENT
Start: 2024-05-01 | End: 2024-05-01

## 2024-05-01 RX ORDER — IBUPROFEN 200 MG
24 TABLET ORAL
Status: DISCONTINUED | OUTPATIENT
Start: 2024-05-01 | End: 2024-05-03 | Stop reason: HOSPADM

## 2024-05-01 RX ORDER — ONDANSETRON HYDROCHLORIDE 2 MG/ML
4 INJECTION, SOLUTION INTRAVENOUS
Status: COMPLETED | OUTPATIENT
Start: 2024-05-01 | End: 2024-05-01

## 2024-05-01 RX ORDER — ONDANSETRON HYDROCHLORIDE 2 MG/ML
4 INJECTION, SOLUTION INTRAVENOUS EVERY 8 HOURS PRN
Status: DISCONTINUED | OUTPATIENT
Start: 2024-05-01 | End: 2024-05-03 | Stop reason: HOSPADM

## 2024-05-01 RX ORDER — HYDROCODONE BITARTRATE AND ACETAMINOPHEN 5; 325 MG/1; MG/1
1 TABLET ORAL
Status: COMPLETED | OUTPATIENT
Start: 2024-05-01 | End: 2024-05-01

## 2024-05-01 RX ORDER — CARVEDILOL 12.5 MG/1
12.5 TABLET ORAL 2 TIMES DAILY
Status: DISCONTINUED | OUTPATIENT
Start: 2024-05-01 | End: 2024-05-03 | Stop reason: HOSPADM

## 2024-05-01 RX ORDER — PROCHLORPERAZINE EDISYLATE 5 MG/ML
5 INJECTION INTRAMUSCULAR; INTRAVENOUS EVERY 6 HOURS PRN
Status: DISCONTINUED | OUTPATIENT
Start: 2024-05-01 | End: 2024-05-03 | Stop reason: HOSPADM

## 2024-05-01 RX ORDER — ENOXAPARIN SODIUM 100 MG/ML
40 INJECTION SUBCUTANEOUS EVERY 24 HOURS
Status: DISCONTINUED | OUTPATIENT
Start: 2024-05-01 | End: 2024-05-03 | Stop reason: HOSPADM

## 2024-05-01 RX ORDER — NALOXONE HCL 0.4 MG/ML
0.02 VIAL (ML) INJECTION
Status: DISCONTINUED | OUTPATIENT
Start: 2024-05-01 | End: 2024-05-03 | Stop reason: HOSPADM

## 2024-05-01 RX ORDER — INSULIN ASPART 100 [IU]/ML
0-5 INJECTION, SOLUTION INTRAVENOUS; SUBCUTANEOUS
Status: DISCONTINUED | OUTPATIENT
Start: 2024-05-01 | End: 2024-05-03 | Stop reason: HOSPADM

## 2024-05-01 RX ORDER — SODIUM CHLORIDE 0.9 % (FLUSH) 0.9 %
10 SYRINGE (ML) INJECTION EVERY 12 HOURS PRN
Status: DISCONTINUED | OUTPATIENT
Start: 2024-05-01 | End: 2024-05-03 | Stop reason: HOSPADM

## 2024-05-01 RX ORDER — IBUPROFEN 200 MG
16 TABLET ORAL
Status: DISCONTINUED | OUTPATIENT
Start: 2024-05-01 | End: 2024-05-03 | Stop reason: HOSPADM

## 2024-05-01 RX ORDER — GLUCAGON 1 MG
1 KIT INJECTION
Status: DISCONTINUED | OUTPATIENT
Start: 2024-05-01 | End: 2024-05-03 | Stop reason: HOSPADM

## 2024-05-01 RX ORDER — ACETAMINOPHEN 325 MG/1
650 TABLET ORAL EVERY 4 HOURS PRN
Status: DISCONTINUED | OUTPATIENT
Start: 2024-05-01 | End: 2024-05-03 | Stop reason: HOSPADM

## 2024-05-01 RX ORDER — SODIUM CHLORIDE 9 MG/ML
INJECTION, SOLUTION INTRAVENOUS CONTINUOUS
Status: DISCONTINUED | OUTPATIENT
Start: 2024-05-01 | End: 2024-05-02

## 2024-05-01 RX ORDER — METRONIDAZOLE 500 MG/100ML
500 INJECTION, SOLUTION INTRAVENOUS EVERY 8 HOURS
Qty: 1800 ML | Refills: 0 | Status: DISCONTINUED | OUTPATIENT
Start: 2024-05-01 | End: 2024-05-03

## 2024-05-01 RX ADMIN — ONDANSETRON 4 MG: 2 INJECTION INTRAMUSCULAR; INTRAVENOUS at 05:05

## 2024-05-01 RX ADMIN — METRONIDAZOLE 500 MG: 500 INJECTION, SOLUTION INTRAVENOUS at 10:05

## 2024-05-01 RX ADMIN — HYDROCODONE BITARTRATE AND ACETAMINOPHEN 1 TABLET: 5; 325 TABLET ORAL at 08:05

## 2024-05-01 RX ADMIN — ONDANSETRON 4 MG: 2 INJECTION INTRAMUSCULAR; INTRAVENOUS at 10:05

## 2024-05-01 RX ADMIN — ACETAMINOPHEN 650 MG: 325 TABLET ORAL at 10:05

## 2024-05-01 RX ADMIN — CEFTRIAXONE SODIUM 2 G: 2 INJECTION, POWDER, FOR SOLUTION INTRAMUSCULAR; INTRAVENOUS at 04:05

## 2024-05-01 RX ADMIN — SODIUM CHLORIDE 1000 ML: 9 INJECTION, SOLUTION INTRAVENOUS at 04:05

## 2024-05-01 RX ADMIN — IOHEXOL 75 ML: 350 INJECTION, SOLUTION INTRAVENOUS at 10:05

## 2024-05-01 RX ADMIN — SODIUM CHLORIDE: 9 INJECTION, SOLUTION INTRAVENOUS at 10:05

## 2024-05-01 NOTE — ED PROVIDER NOTES
Encounter Date: 5/1/2024     History     Chief Complaint   Patient presents with    Nausea     +vomiting. Hx. Kidney tx. 2016     HPI  64 yo M with a PMHx of HTN, pulmonary HTN, anemia, and ESRD with renal transplant who presented to the ED with L upper side pain and f/c/n/v. Patient says pain started 3 days ago and he believes it is related to kidneys. Say he has been unable to keep food down which he believes is making him feel week. Only able to drink water. Denies SOB or chest pain.He denies coming in to the ED earlier or following up with his PCP or nephrologist due to no insurance. Patient feels tired. Patient admits to make urine. He continues to take Prograf and Cellcept medications.     In the ED, Febrile, WBC 13.52, A1C 13.6, Cr 1.5, eGFR 51.7, Procalcitonin .52, Lactate 297, Lipase WNL. Influenza negative. Chest XR unremarkable. Blood cultures pending. UA shows presence of Protein +2 but overall unremarkable for infection. Abdomen CT pending.     Review of patient's allergies indicates:  No Known Allergies  Past Medical History:   Diagnosis Date    Anemia of chronic renal failure 12/3/2013    Blind right eye     Cataract     CKD (chronic kidney disease) stage 2, GFR 60-89 ml/min 2/24/2016    CKD (chronic kidney disease) stage 2, GFR 60-89 ml/min 2/24/2016    ESRD (end stage renal disease) 2008    Hypertension     Hypomagnesemia 1/18/2017    Hypophosphatemia 1/18/2017    Latent TB - 4/7/11 - INH x 9 months 12/3/2013    Pulmonary hypertension - PASP 58mmHg (9/13) 12/3/2013    Pulmonary hypertension - PASP 58mmHg (9/13) improved on recent echo PA pressure of 13 12/3/2013    S/P kidney transplant 1/28/2016     Secondary hyperparathyroidism, renal      Past Surgical History:   Procedure Laterality Date    APPENDECTOMY      AV FISTULA PLACEMENT      EUSEBIO    COLONOSCOPY      CORNEAL TRANSPLANT  01/01/1997    LEFT EYE    CORNEAL TRANSPLANT  01/01/2010    RIGHT EYE    CORNEAL TRANSPLANT  01/01/2000    LEFT EYE     EYE SURGERY      KIDNEY TRANSPLANT  01/28/2016    TONSILLECTOMY       Family History   Problem Relation Name Age of Onset    Hypertension Mother      Cancer Maternal Aunt      Kidney disease Maternal Aunt      Amblyopia Neg Hx      Blindness Neg Hx      Cataracts Neg Hx      Diabetes Neg Hx      Glaucoma Neg Hx      Macular degeneration Neg Hx      Retinal detachment Neg Hx      Strabismus Neg Hx      Stroke Neg Hx      Thyroid disease Neg Hx       Social History     Tobacco Use    Smoking status: Never    Smokeless tobacco: Never   Substance Use Topics    Alcohol use: Yes     Comment: beer rarely    Drug use: No     Physical Exam     Initial Vitals [05/01/24 1326]   BP Pulse Resp Temp SpO2   (!) 130/93 100 16 97.7 °F (36.5 °C) 95 %      MAP       --         Physical Exam    Constitutional: He appears distressed.   Axilla temperature 100F when seen bedside.    HENT:   Head: Normocephalic and atraumatic.   Eyes: Pupils are equal, round, and reactive to light.   Neck:   Normal range of motion.  Cardiovascular:  Normal rate and regular rhythm.     Exam reveals no friction rub.       No murmur heard.  Pedal pulses palpable    Pulmonary/Chest: Breath sounds normal. No respiratory distress. He has no wheezes.   Abdominal: He exhibits no mass. There is abdominal tenderness.   Pain to palpation to L upper side chest. Incision on R lower abdomen healed.  There is guarding.   Musculoskeletal:         General: Tenderness present. Normal range of motion.      Cervical back: Normal range of motion.     Neurological: He is alert and oriented to person, place, and time.   Skin: Skin is warm. Capillary refill takes less than 2 seconds.   Psychiatric: He has a normal mood and affect.       ED Course   Procedures  Labs Reviewed   CBC W/ AUTO DIFFERENTIAL - Abnormal; Notable for the following components:       Result Value    WBC 13.51 (*)     Hemoglobin 13.6 (*)     Platelets 137 (*)     Gran # (ANC) 10.6 (*)     Immature Grans (Abs)  0.05 (*)     Lymph # 0.8 (*)     Mono # 2.0 (*)     Gran % 78.7 (*)     Lymph % 5.8 (*)     All other components within normal limits    Narrative:     Add on TROP per RN DANEA Epic order [id number]  16:00  05/01/2024    COMPREHENSIVE METABOLIC PANEL - Abnormal; Notable for the following components:    Creatinine 1.5 (*)     Calcium 10.8 (*)     Total Protein 8.7 (*)     Alkaline Phosphatase 50 (*)     eGFR 51.7 (*)     All other components within normal limits    Narrative:     Add on TROP per RN DANEA Epic order [id number]  16:00  05/01/2024    PROCALCITONIN - Abnormal; Notable for the following components:    Procalcitonin 0.52 (*)     All other components within normal limits   URINALYSIS, REFLEX TO URINE CULTURE - Abnormal; Notable for the following components:    Protein, UA 2+ (*)     Occult Blood UA 2+ (*)     All other components within normal limits    Narrative:     Specimen Source->Urine   URINALYSIS MICROSCOPIC - Abnormal; Notable for the following components:    WBC, UA 9 (*)     All other components within normal limits    Narrative:     Specimen Source->Urine   ISTAT PROCEDURE - Abnormal; Notable for the following components:    POC PO2 17 (*)     All other components within normal limits   INFLUENZA A & B BY MOLECULAR   CULTURE, BLOOD   CULTURE, BLOOD   HIV 1 / 2 ANTIBODY    Narrative:     Release to patient->Immediate   HEPATITIS C ANTIBODY    Narrative:     Release to patient->Immediate   LIPASE    Narrative:     Add on TROP per RN DANEA Epic order [id number]  16:00  05/01/2024    APTT   LACTIC ACID, PLASMA   B-TYPE NATRIURETIC PEPTIDE   TROPONIN I   TROPONIN I    Narrative:     Add on TROP per RN DANEA Epic order [id number]  16:00  05/01/2024    B-TYPE NATRIURETIC PEPTIDE    Narrative:     Add on TROP per RN DANEA Epic order [id number]  16:00  05/01/2024   add on BNP per Kevin Rich MD  05/01/2024  16:40     TACROLIMUS LEVEL   MYCOPHENOLIC ACID   LACTIC ACID, PLASMA   ISTAT LACTATE   POCT  GLUCOSE MONITORING CONTINUOUS          Imaging Results              X-Ray Chest AP Portable (Final result)  Result time 05/01/24 18:27:13      Final result by Dominic Babcock MD (05/01/24 18:27:13)                   Impression:      No acute process.      Electronically signed by: Dominic Babcock MD  Date:    05/01/2024  Time:    18:27               Narrative:    EXAMINATION:  XR CHEST AP PORTABLE    CLINICAL HISTORY:  Sepsis;    TECHNIQUE:  Single frontal view of the chest was performed.    COMPARISON:  01/28/2016.    FINDINGS:  The previous bilateral central access catheters are no longer visualized.  Monitoring EKG leads are present.  The trachea is unremarkable.  The cardiomediastinal silhouette is within normal limits.  There is no evidence of free air beneath hemidiaphragms.  There are no pleural effusions.  There is no evidence of a pneumothorax.  There is no evidence of pneumomediastinum.  No airspace opacity is present.  The osseous structures are unremarkable.                                       Medications   sodium chloride 0.9% flush 10 mL (has no administration in time range)   naloxone 0.4 mg/mL injection 0.02 mg (has no administration in time range)   glucose chewable tablet 16 g (has no administration in time range)   glucose chewable tablet 24 g (has no administration in time range)   glucagon (human recombinant) injection 1 mg (has no administration in time range)   acetaminophen tablet 650 mg (has no administration in time range)   ondansetron injection 4 mg (has no administration in time range)   prochlorperazine injection Soln 5 mg (has no administration in time range)   insulin aspart U-100 pen 0-5 Units (has no administration in time range)   dextrose 10% bolus 125 mL 125 mL (has no administration in time range)   dextrose 10% bolus 250 mL 250 mL (has no administration in time range)   carvediloL tablet 12.5 mg (has no administration in time range)   0.9%  NaCl infusion (has no administration in  time range)   metronidazole IVPB 500 mg (has no administration in time range)   cefTRIAXone (ROCEPHIN) 2 g in dextrose 5 % in water (D5W) 100 mL IVPB (MB+) (has no administration in time range)   acetaminophen tablet 650 mg (has no administration in time range)   enoxaparin injection 40 mg (has no administration in time range)   iohexoL (OMNIPAQUE 350) injection 75 mL (has no administration in time range)   sodium chloride 0.9% bolus 1,000 mL 1,000 mL (0 mLs Intravenous Stopped 5/1/24 1737)   cefTRIAXone (ROCEPHIN) 2 g in dextrose 5 % in water (D5W) 100 mL IVPB (MB+) (0 g Intravenous Stopped 5/1/24 1710)   ondansetron injection 4 mg (4 mg Intravenous Given 5/1/24 1720)   HYDROcodone-acetaminophen 5-325 mg per tablet 1 tablet (1 tablet Oral Given 5/1/24 2046)     Medical Decision Making  63-year-old gentleman with a history of renal transplant 8 years ago it is presenting with left upper quadrant pain, fever, meeting SIRS criteria. Fever of 100.9. White count of 13-1/2. He is on Prograf and CellCept. He notes mild nausea. Symptoms ongoing 3 days. No significant tenderness to palpation of his left upper quadrant which is where he localizes pain. I initially suspected UTI/pyelo possibly involving the native kidney. However his urine has come back unimpressive. He does not have a surgical abdomen, no real tenderness, no peritoneal signs, no rebound or guarding. Chest x-ray is negative. I am putting him in for a CT scan chest abdomen pelvis for sepsis of unclear origin. Scan is pending at this time. Plan to admit to inpatient.     Amount and/or Complexity of Data Reviewed  Labs: ordered.  Radiology: ordered.    Risk  OTC drugs.  Prescription drug management.  Decision regarding hospitalization.              Attending Attestation:   Physician Attestation Statement for Resident:  As the supervising MD   Physician Attestation Statement: I have personally seen and examined this patient.   I agree with the above history.  -:    As the supervising MD I agree with the above PE.     As the supervising MD I agree with the above treatment, course, plan, and disposition.                                     Clinical Impression:  Final diagnoses:  [A41.9] Sepsis  [D84.9] Immunosuppression (Primary)          ED Disposition Condition    Admit Stable                  Nessa Beltran DPM  Resident  05/01/24 2032       Kevin Rich MD  05/01/24 0514

## 2024-05-01 NOTE — ED TRIAGE NOTES
Patient comes into the emergency department by POV with complaints of nausea/vomiting. Patient states that he has been having nausea and vomiting. Patient states that he has missed work for 2 days because he has felt so bad. Pt states his stool has been regular but loose. Pt states he had a fever for 1 day. Patient denies SOB/CP.

## 2024-05-01 NOTE — PHARMACY MED REC
"Admission Medication History     The home medication history was taken by May Ramírez.    You may go to "Admission" then "Reconcile Home Medications" tabs to review and/or act upon these items.     The home medication list has been updated by the Pharmacy department.   Please read ALL comments highlighted in yellow.   Please address this information as you see fit.    Feel free to contact us if you have any questions or require assistance.      Medications listed below were obtained from: Patient/family  Current Outpatient Medications   Medication Sig    carvediloL (COREG) 12.5 MG tablet Take 1 tablet (12.5 mg total) by mouth 2 (two) times daily.      multivitamin (THERAGRAN) tablet   Take 1 tablet by mouth once daily.    mycophenolate (CELLCEPT) 250 mg Cap   Take 2 capsules (500 mg total) by mouth 2 (two) times daily.    tacrolimus (PROGRAF) 1 MG Cap Take 2 capsules (2 mg total) by mouth every 12 (twelve) hours.         May Ramírez  EXT [id number]                  .          "

## 2024-05-02 PROBLEM — N28.89 RENAL MASS, RIGHT: Status: ACTIVE | Noted: 2024-05-02

## 2024-05-02 LAB
ANION GAP SERPL CALC-SCNC: 11 MMOL/L (ref 8–16)
BASOPHILS # BLD AUTO: 0.04 K/UL (ref 0–0.2)
BASOPHILS NFR BLD: 0.4 % (ref 0–1.9)
BUN SERPL-MCNC: 21 MG/DL (ref 8–23)
CALCIUM SERPL-MCNC: 10.2 MG/DL (ref 8.7–10.5)
CHLORIDE SERPL-SCNC: 104 MMOL/L (ref 95–110)
CO2 SERPL-SCNC: 22 MMOL/L (ref 23–29)
CREAT SERPL-MCNC: 1.6 MG/DL (ref 0.5–1.4)
DIFFERENTIAL METHOD BLD: ABNORMAL
EOSINOPHIL # BLD AUTO: 0 K/UL (ref 0–0.5)
EOSINOPHIL NFR BLD: 0.2 % (ref 0–8)
ERYTHROCYTE [DISTWIDTH] IN BLOOD BY AUTOMATED COUNT: 14 % (ref 11.5–14.5)
EST. GFR  (NO RACE VARIABLE): 47.8 ML/MIN/1.73 M^2
GLUCOSE SERPL-MCNC: 81 MG/DL (ref 70–110)
HCT VFR BLD AUTO: 41.1 % (ref 40–54)
HGB BLD-MCNC: 13.5 G/DL (ref 14–18)
IMM GRANULOCYTES # BLD AUTO: 0.03 K/UL (ref 0–0.04)
IMM GRANULOCYTES NFR BLD AUTO: 0.3 % (ref 0–0.5)
LACTATE SERPL-SCNC: 1 MMOL/L (ref 0.5–2.2)
LYMPHOCYTES # BLD AUTO: 0.7 K/UL (ref 1–4.8)
LYMPHOCYTES NFR BLD: 6.2 % (ref 18–48)
MAGNESIUM SERPL-MCNC: 1.9 MG/DL (ref 1.6–2.6)
MCH RBC QN AUTO: 29.2 PG (ref 27–31)
MCHC RBC AUTO-ENTMCNC: 32.8 G/DL (ref 32–36)
MCV RBC AUTO: 89 FL (ref 82–98)
MONOCYTES # BLD AUTO: 1.5 K/UL (ref 0.3–1)
MONOCYTES NFR BLD: 14 % (ref 4–15)
MYCOPHENOLATE SERPL-MCNC: <0.5 MCG/ML (ref 1–3.5)
MYCOPHENOLATE-G SERPL-MCNC: <10 MCG/ML (ref 35–100)
NEUTROPHILS # BLD AUTO: 8.4 K/UL (ref 1.8–7.7)
NEUTROPHILS NFR BLD: 78.9 % (ref 38–73)
NRBC BLD-RTO: 0 /100 WBC
PHOSPHATE SERPL-MCNC: 2.2 MG/DL (ref 2.7–4.5)
PLATELET # BLD AUTO: 123 K/UL (ref 150–450)
PMV BLD AUTO: 11.5 FL (ref 9.2–12.9)
POCT GLUCOSE: 106 MG/DL (ref 70–110)
POCT GLUCOSE: 97 MG/DL (ref 70–110)
POTASSIUM SERPL-SCNC: 3.9 MMOL/L (ref 3.5–5.1)
PROCALCITONIN SERPL IA-MCNC: 0.48 NG/ML
PTH-INTACT SERPL-MCNC: 81.9 PG/ML (ref 9–77)
RBC # BLD AUTO: 4.62 M/UL (ref 4.6–6.2)
SODIUM SERPL-SCNC: 137 MMOL/L (ref 136–145)
TACROLIMUS BLD-MCNC: <2 NG/ML (ref 5–15)
WBC # BLD AUTO: 10.59 K/UL (ref 3.9–12.7)

## 2024-05-02 PROCEDURE — 99223 1ST HOSP IP/OBS HIGH 75: CPT | Mod: ,,, | Performed by: HOSPITALIST

## 2024-05-02 PROCEDURE — 83970 ASSAY OF PARATHORMONE: CPT | Performed by: STUDENT IN AN ORGANIZED HEALTH CARE EDUCATION/TRAINING PROGRAM

## 2024-05-02 PROCEDURE — 36415 COLL VENOUS BLD VENIPUNCTURE: CPT | Performed by: STUDENT IN AN ORGANIZED HEALTH CARE EDUCATION/TRAINING PROGRAM

## 2024-05-02 PROCEDURE — 85025 COMPLETE CBC W/AUTO DIFF WBC: CPT | Performed by: STUDENT IN AN ORGANIZED HEALTH CARE EDUCATION/TRAINING PROGRAM

## 2024-05-02 PROCEDURE — 80048 BASIC METABOLIC PNL TOTAL CA: CPT | Performed by: STUDENT IN AN ORGANIZED HEALTH CARE EDUCATION/TRAINING PROGRAM

## 2024-05-02 PROCEDURE — 83735 ASSAY OF MAGNESIUM: CPT | Performed by: STUDENT IN AN ORGANIZED HEALTH CARE EDUCATION/TRAINING PROGRAM

## 2024-05-02 PROCEDURE — 63600175 PHARM REV CODE 636 W HCPCS: Performed by: STUDENT IN AN ORGANIZED HEALTH CARE EDUCATION/TRAINING PROGRAM

## 2024-05-02 PROCEDURE — 83605 ASSAY OF LACTIC ACID: CPT | Performed by: EMERGENCY MEDICINE

## 2024-05-02 PROCEDURE — 36415 COLL VENOUS BLD VENIPUNCTURE: CPT | Performed by: EMERGENCY MEDICINE

## 2024-05-02 PROCEDURE — 84145 PROCALCITONIN (PCT): CPT | Performed by: STUDENT IN AN ORGANIZED HEALTH CARE EDUCATION/TRAINING PROGRAM

## 2024-05-02 PROCEDURE — 63600175 PHARM REV CODE 636 W HCPCS: Performed by: HOSPITALIST

## 2024-05-02 PROCEDURE — 25000003 PHARM REV CODE 250: Performed by: INTERNAL MEDICINE

## 2024-05-02 PROCEDURE — 84100 ASSAY OF PHOSPHORUS: CPT | Performed by: STUDENT IN AN ORGANIZED HEALTH CARE EDUCATION/TRAINING PROGRAM

## 2024-05-02 PROCEDURE — 21400001 HC TELEMETRY ROOM

## 2024-05-02 PROCEDURE — 63600175 PHARM REV CODE 636 W HCPCS: Performed by: INTERNAL MEDICINE

## 2024-05-02 PROCEDURE — 25000003 PHARM REV CODE 250: Performed by: HOSPITALIST

## 2024-05-02 PROCEDURE — 25000003 PHARM REV CODE 250: Performed by: STUDENT IN AN ORGANIZED HEALTH CARE EDUCATION/TRAINING PROGRAM

## 2024-05-02 RX ORDER — PANTOPRAZOLE SODIUM 40 MG/1
40 TABLET, DELAYED RELEASE ORAL DAILY
Status: DISCONTINUED | OUTPATIENT
Start: 2024-05-02 | End: 2024-05-03 | Stop reason: HOSPADM

## 2024-05-02 RX ORDER — MYCOPHENOLATE MOFETIL 250 MG/1
500 CAPSULE ORAL 2 TIMES DAILY
Status: DISCONTINUED | OUTPATIENT
Start: 2024-05-02 | End: 2024-05-02

## 2024-05-02 RX ORDER — TACROLIMUS 1 MG/1
2 CAPSULE ORAL 2 TIMES DAILY
Status: DISCONTINUED | OUTPATIENT
Start: 2024-05-02 | End: 2024-05-03 | Stop reason: HOSPADM

## 2024-05-02 RX ORDER — POLYETHYLENE GLYCOL 3350 17 G/17G
17 POWDER, FOR SOLUTION ORAL DAILY
Status: DISCONTINUED | OUTPATIENT
Start: 2024-05-02 | End: 2024-05-03 | Stop reason: HOSPADM

## 2024-05-02 RX ORDER — MORPHINE SULFATE 2 MG/ML
2 INJECTION, SOLUTION INTRAMUSCULAR; INTRAVENOUS EVERY 4 HOURS PRN
Status: DISCONTINUED | OUTPATIENT
Start: 2024-05-02 | End: 2024-05-03 | Stop reason: HOSPADM

## 2024-05-02 RX ORDER — OXYCODONE HYDROCHLORIDE 5 MG/1
5 TABLET ORAL EVERY 6 HOURS PRN
Status: DISCONTINUED | OUTPATIENT
Start: 2024-05-02 | End: 2024-05-03 | Stop reason: HOSPADM

## 2024-05-02 RX ADMIN — CARVEDILOL 12.5 MG: 12.5 TABLET, FILM COATED ORAL at 10:05

## 2024-05-02 RX ADMIN — ENOXAPARIN SODIUM 40 MG: 40 INJECTION SUBCUTANEOUS at 10:05

## 2024-05-02 RX ADMIN — TACROLIMUS 2 MG: 1 CAPSULE ORAL at 06:05

## 2024-05-02 RX ADMIN — METRONIDAZOLE 500 MG: 500 INJECTION, SOLUTION INTRAVENOUS at 06:05

## 2024-05-02 RX ADMIN — MORPHINE SULFATE 2 MG: 2 INJECTION, SOLUTION INTRAMUSCULAR; INTRAVENOUS at 09:05

## 2024-05-02 RX ADMIN — OXYCODONE 5 MG: 5 TABLET ORAL at 06:05

## 2024-05-02 RX ADMIN — METRONIDAZOLE 500 MG: 500 INJECTION, SOLUTION INTRAVENOUS at 10:05

## 2024-05-02 RX ADMIN — MORPHINE SULFATE 2 MG: 2 INJECTION, SOLUTION INTRAMUSCULAR; INTRAVENOUS at 02:05

## 2024-05-02 RX ADMIN — ACETAMINOPHEN 650 MG: 325 TABLET ORAL at 05:05

## 2024-05-02 RX ADMIN — METRONIDAZOLE 500 MG: 500 INJECTION, SOLUTION INTRAVENOUS at 01:05

## 2024-05-02 RX ADMIN — CEFTRIAXONE SODIUM 2 G: 2 INJECTION, POWDER, FOR SOLUTION INTRAMUSCULAR; INTRAVENOUS at 04:05

## 2024-05-02 RX ADMIN — PANTOPRAZOLE SODIUM 40 MG: 40 TABLET, DELAYED RELEASE ORAL at 02:05

## 2024-05-02 RX ADMIN — CARVEDILOL 12.5 MG: 12.5 TABLET, FILM COATED ORAL at 09:05

## 2024-05-02 RX ADMIN — ONDANSETRON 4 MG: 2 INJECTION INTRAMUSCULAR; INTRAVENOUS at 09:05

## 2024-05-02 NOTE — ASSESSMENT & PLAN NOTE
62 yo M with h/o HTN, CKD s/p transplant (2016), Anemia of chronic renal failure, Secondary hyperparathyroidism, renal; Long-term use of immunosuppressant medication presented to ED for nausea vomiting and abdominal pain.      - He is meeting SIRS criteria  - Abdomen CT pending.  - In the ED, received ceftriaxone, 1L NS iv bolus  - start ceftriaxone 2 g IV daily, metronidazole  Q 8 hours  - we will monitor change antibiotics according to CT scan abdomen and clinical status  - keep NPO for now due to nausea vomiting, slowly upgrade the diet once nausea vomiting resolved   - PRN IV antiemetics (EKG 5/1/24  ms)

## 2024-05-02 NOTE — HPI
64 yo M with h/o HTN, CKD s/p transplant (2016), Anemia of chronic renal failure, Secondary hyperparathyroidism, renal; Long-term use of immunosuppressant medication presented to ED for nausea vomiting and abdominal pain.  He reports nausea and vomiting started 3 days ago he has not been able to eat anything for last 3 days because of vomiting anything he eats.  He also reports of abdominal pain left upper quadrant, 10 x 10 intensity, dull, intermittent, nonradiating, no any aggravating or alleviating factor.  He reports he started having a fever today only.  He has not been following with primary care physician or nephrologist because of no insurance.  He mentioned he passes urine inadequate amount, frequency everyday.  He is compliant to his carvedilol, CellCept, Prograf medicines regularly     In the ED, received ceftriaxone, 1L NS iv bolus. Temp 100.9, , RR 21, SaO2 >95% RA, -140s/80-90s. WBC 13.51, Hb 13.6, Platelets 137, Creat 1.5, BNP 32, Trop I 0.026, Lactate 1.5, Procal 0.52, UA Neg, Lipase WNL. Influenza negative. Blood cultures pending.    EKG 5/1/24 sinus rhythm with Premature supraventricular complexes and with occasional Premature ventricular complexes ST and T wave abnormality, consider lateral ischemia    Chest XR 5/1/24 unremarkable.     Abdomen CT pending.

## 2024-05-02 NOTE — SUBJECTIVE & OBJECTIVE
Past Medical History:   Diagnosis Date    Anemia of chronic renal failure 12/3/2013    Blind right eye     Cataract     CKD (chronic kidney disease) stage 2, GFR 60-89 ml/min 2/24/2016    CKD (chronic kidney disease) stage 2, GFR 60-89 ml/min 2/24/2016    ESRD (end stage renal disease) 2008    Hypertension     Hypomagnesemia 1/18/2017    Hypophosphatemia 1/18/2017    Latent TB - 4/7/11 - INH x 9 months 12/3/2013    Pulmonary hypertension - PASP 58mmHg (9/13) 12/3/2013    Pulmonary hypertension - PASP 58mmHg (9/13) improved on recent echo PA pressure of 13 12/3/2013    S/P kidney transplant 1/28/2016     Secondary hyperparathyroidism, renal        Past Surgical History:   Procedure Laterality Date    APPENDECTOMY      AV FISTULA PLACEMENT      EUSEBIO    COLONOSCOPY      CORNEAL TRANSPLANT  01/01/1997    LEFT EYE    CORNEAL TRANSPLANT  01/01/2010    RIGHT EYE    CORNEAL TRANSPLANT  01/01/2000    LEFT EYE    EYE SURGERY      KIDNEY TRANSPLANT  01/28/2016    TONSILLECTOMY         Review of patient's allergies indicates:  No Known Allergies    Current Facility-Administered Medications   Medication Dose Route Frequency Provider Last Rate Last Admin    0.9%  NaCl infusion   Intravenous Continuous Berry Tijerina MD        acetaminophen tablet 650 mg  650 mg Oral Q4H PRN Berry Tijerina MD        acetaminophen tablet 650 mg  650 mg Oral ED 1 Time Nessa Beltran DPM        carvediloL tablet 12.5 mg  12.5 mg Oral BID Berry Tijerina MD        [START ON 5/2/2024] cefTRIAXone (ROCEPHIN) 2 g in dextrose 5 % in water (D5W) 100 mL IVPB (MB+)  2 g Intravenous Q24H Berry Tijerina MD        dextrose 10% bolus 125 mL 125 mL  12.5 g Intravenous PRN eBrry Tijerina MD        dextrose 10% bolus 250 mL 250 mL  25 g Intravenous PRN Berry Tijerina MD        glucagon (human recombinant) injection 1 mg  1 mg Intramuscular PRN Berry Tijerina MD        glucose chewable tablet 16 g  16 g Oral PRN Berry Tijerina MD        glucose chewable tablet 24 g  24 g Oral PRN  Berry Tijerina MD        insulin aspart U-100 pen 0-5 Units  0-5 Units Subcutaneous QID (AC + HS) PRN Berry Tijerina MD        metronidazole IVPB 500 mg  500 mg Intravenous Q8H Berry Tijerina MD        naloxone 0.4 mg/mL injection 0.02 mg  0.02 mg Intravenous PRN Berry Tijerina MD        ondansetron injection 4 mg  4 mg Intravenous Q8H PRN Berry Tijerina MD        prochlorperazine injection Soln 5 mg  5 mg Intravenous Q6H PRN Berry Tijerina MD        sodium chloride 0.9% flush 10 mL  10 mL Intravenous Q12H PRN Berry Tijerina MD         Current Outpatient Medications   Medication Sig Dispense Refill    carvediloL (COREG) 12.5 MG tablet Take 1 tablet (12.5 mg total) by mouth 2 (two) times daily. 180 tablet 0    multivitamin (THERAGRAN) tablet Take 1 tablet by mouth once daily. 30 tablet 2    mycophenolate (CELLCEPT) 250 mg Cap Take 2 capsules (500 mg total) by mouth 2 (two) times daily. 120 capsule 11    tacrolimus (PROGRAF) 1 MG Cap Take 2 capsules (2 mg total) by mouth every 12 (twelve) hours. 120 capsule 11     Family History       Problem Relation (Age of Onset)    Cancer Maternal Aunt    Hypertension Mother    Kidney disease Maternal Aunt          Tobacco Use    Smoking status: Never    Smokeless tobacco: Never   Substance and Sexual Activity    Alcohol use: Yes     Comment: beer rarely    Drug use: No    Sexual activity: Yes     Partners: Female     Review of Systems   Constitutional:  Negative for chills, diaphoresis, fatigue and fever.   HENT:  Negative for congestion, ear pain, sore throat, tinnitus and trouble swallowing.    Eyes:  Negative for photophobia, discharge and visual disturbance.   Respiratory:  Negative for apnea, cough, choking, chest tightness and shortness of breath.    Cardiovascular:  Negative for chest pain, palpitations and leg swelling.   Gastrointestinal:  Positive for abdominal pain, nausea and vomiting. Negative for abdominal distention, constipation and diarrhea.   Endocrine: Negative for  polydipsia, polyphagia and polyuria.   Genitourinary:  Negative for difficulty urinating and dysuria.   Musculoskeletal:  Negative for arthralgias, back pain and gait problem.   Skin:  Negative for color change and rash.   Neurological:  Negative for dizziness, syncope, speech difficulty, weakness, light-headedness and headaches.   Psychiatric/Behavioral:  Negative for agitation, confusion, dysphoric mood, hallucinations and sleep disturbance. The patient is not nervous/anxious.      Objective:     Vital Signs (Most Recent):  Temp: (!) 101.4 °F (38.6 °C) (05/01/24 2047)  Pulse: 102 (05/01/24 2118)  Resp: 20 (05/01/24 2118)  BP: (!) 132/95 (05/01/24 2118)  SpO2: 96 % (05/01/24 1852) Vital Signs (24h Range):  Temp:  [97.7 °F (36.5 °C)-101.4 °F (38.6 °C)] 101.4 °F (38.6 °C)  Pulse:  [] 102  Resp:  [16-21] 20  SpO2:  [95 %-99 %] 96 %  BP: (130-147)/(84-95) 132/95     Weight: 83.9 kg (185 lb)  Body mass index is 28.98 kg/m².     Physical Exam  Constitutional:       General: He is not in acute distress.     Appearance: Normal appearance. He is normal weight.   HENT:      Head: Normocephalic and atraumatic.      Nose: No congestion.      Mouth/Throat:      Mouth: Mucous membranes are moist.   Eyes:      Extraocular Movements: Extraocular movements intact.      Conjunctiva/sclera: Conjunctivae normal.      Pupils: Pupils are equal, round, and reactive to light.   Neck:      Vascular: No carotid bruit.   Cardiovascular:      Rate and Rhythm: Normal rate and regular rhythm.      Pulses: Normal pulses.      Heart sounds: Normal heart sounds. No murmur heard.     No gallop.   Pulmonary:      Effort: Pulmonary effort is normal. No respiratory distress.      Breath sounds: Normal breath sounds. No wheezing, rhonchi or rales.   Chest:      Chest wall: No tenderness.   Abdominal:      General: Abdomen is flat. Bowel sounds are normal. There is no distension.      Tenderness: There is abdominal tenderness.      Comments: LUQ  Tenderness+   Musculoskeletal:         General: No swelling.      Cervical back: No rigidity.   Skin:     Capillary Refill: Capillary refill takes less than 2 seconds.   Neurological:      General: No focal deficit present.      Mental Status: He is alert and oriented to person, place, and time. Mental status is at baseline.      Cranial Nerves: No cranial nerve deficit.      Motor: No weakness.   Psychiatric:         Mood and Affect: Mood normal.         Behavior: Behavior normal.              CRANIAL NERVES     CN III, IV, VI   Pupils are equal, round, and reactive to light.       Significant Labs: All pertinent labs within the past 24 hours have been reviewed.    Significant Imaging: I have reviewed all pertinent imaging results/findings within the past 24 hours.

## 2024-05-02 NOTE — HPI
64 yo male psot kidney transplant in 2016 presented to hospital with N/V, LUQ abdominal pain, and fever on day of admission, meeting SIRS criteria. UA noteworthy for 2+ blood and protein, w/o nitrites but 9 WBCs/hpf.    His baseline creatinine is 1.3-1.5. He reports taking tacrolimus 2 mg bid and  mg BID.    CT a/p with IV contrast had noteworthy findings of native & transplant kidneys:  Kidneys: There is a 3 cm soft tissue density enhancing mass at the upper pole of the native right kidney suggesting renal carcinoma.  Follow-up recommended.  Few probable simple cyst in the right kidney also.  Small subcentimeter cyst in the left native kidney.  There is moderate atrophy of the native kidneys bilaterally.  No stone or hydronephrosis bilaterally.  Transplant kidney noted in the right iliac fossa.  Minimally striated enhancement pattern could represent pyelonephritis of the transplant kidney and follow-up is recommended.  No stone hydronephrosis of the transplant kidney.    Unfortunately, he has not followed with nephrology or transplant medicine in several years due to lack of insurance. He does report compliance with his immunosuppression medication. Labs on arrival show that he is currently at his baseline renal function. Tacrolimus on arrival was less than 2, which he attributes to him being unable to tolerate his pills for several days due to his GI distress.

## 2024-05-02 NOTE — SUBJECTIVE & OBJECTIVE
Oncology Treatment Plan:   [No matching plan found]    Medications:  Continuous Infusions:  Current Facility-Administered Medications   Medication Dose Route Frequency Last Rate Last Admin     Scheduled Meds:  Current Facility-Administered Medications   Medication Dose Route Frequency    carvediloL  12.5 mg Oral BID    cefTRIAXone (Rocephin) IV (PEDS and ADULTS)  2 g Intravenous Q24H    enoxparin  40 mg Subcutaneous Q24H (treatment, non-standard time)    metroNIDAZOLE IV (PEDS and ADULTS)  500 mg Intravenous Q8H    polyethylene glycol  17 g Oral Daily     PRN Meds:  Current Facility-Administered Medications:     acetaminophen, 650 mg, Oral, Q4H PRN    dextrose 10%, 12.5 g, Intravenous, PRN    dextrose 10%, 25 g, Intravenous, PRN    glucagon (human recombinant), 1 mg, Intramuscular, PRN    glucose, 16 g, Oral, PRN    glucose, 24 g, Oral, PRN    insulin aspart U-100, 0-5 Units, Subcutaneous, QID (AC + HS) PRN    morphine, 2 mg, Intravenous, Q4H PRN    naloxone, 0.02 mg, Intravenous, PRN    ondansetron, 4 mg, Intravenous, Q8H PRN    oxyCODONE, 5 mg, Oral, Q6H PRN    prochlorperazine, 5 mg, Intravenous, Q6H PRN    sodium chloride 0.9%, 10 mL, Intravenous, Q12H PRN     Review of patient's allergies indicates:  No Known Allergies     Past Medical History:   Diagnosis Date    Anemia of chronic renal failure 12/3/2013    Blind right eye     Cataract     CKD (chronic kidney disease) stage 2, GFR 60-89 ml/min 2/24/2016    CKD (chronic kidney disease) stage 2, GFR 60-89 ml/min 2/24/2016    ESRD (end stage renal disease) 2008    Hypertension     Hypomagnesemia 1/18/2017    Hypophosphatemia 1/18/2017    Latent TB - 4/7/11 - INH x 9 months 12/3/2013    Pulmonary hypertension - PASP 58mmHg (9/13) 12/3/2013    Pulmonary hypertension - PASP 58mmHg (9/13) improved on recent echo PA pressure of 13 12/3/2013    S/P kidney transplant 1/28/2016     Secondary hyperparathyroidism, renal      Past Surgical History:   Procedure Laterality  Date    APPENDECTOMY      AV FISTULA PLACEMENT      EUSEBIO    COLONOSCOPY      CORNEAL TRANSPLANT  01/01/1997    LEFT EYE    CORNEAL TRANSPLANT  01/01/2010    RIGHT EYE    CORNEAL TRANSPLANT  01/01/2000    LEFT EYE    EYE SURGERY      KIDNEY TRANSPLANT  01/28/2016    TONSILLECTOMY       Family History       Problem Relation (Age of Onset)    Cancer Maternal Aunt    Hypertension Mother    Kidney disease Maternal Aunt          Tobacco Use    Smoking status: Never    Smokeless tobacco: Never   Substance and Sexual Activity    Alcohol use: Yes     Comment: beer rarely    Drug use: No    Sexual activity: Yes     Partners: Female       Review of Systems   Constitutional:  Negative for chills and fever.   HENT:  Negative for congestion and sore throat.    Eyes:  Negative for pain.   Respiratory:  Negative for cough and shortness of breath.    Cardiovascular:  Negative for chest pain, palpitations and leg swelling.   Gastrointestinal:  Positive for abdominal pain (LUQ). Negative for constipation, diarrhea, nausea and vomiting.   Genitourinary:  Negative for difficulty urinating, dysuria and hematuria.   Musculoskeletal:  Negative for back pain.   Skin:  Negative for rash.   Neurological:  Negative for light-headedness and headaches.   Hematological:  Does not bruise/bleed easily.   Psychiatric/Behavioral:  Negative for agitation.      Objective:     Vital Signs (Most Recent):  Temp: 100.3 °F (37.9 °C) (05/02/24 1134)  Pulse: 99 (05/02/24 1134)  Resp: 18 (05/02/24 1134)  BP: (!) 144/85 (05/02/24 1134)  SpO2: 96 % (05/02/24 1134) Vital Signs (24h Range):  Temp:  [97.7 °F (36.5 °C)-101.4 °F (38.6 °C)] 100.3 °F (37.9 °C)  Pulse:  [] 99  Resp:  [16-21] 18  SpO2:  [95 %-99 %] 96 %  BP: (116-147)/(69-95) 144/85     Weight: 83.9 kg (185 lb)  Body mass index is 28.98 kg/m².  Body surface area is 1.99 meters squared.      Intake/Output Summary (Last 24 hours) at 5/2/2024 1304  Last data filed at 5/2/2024 0505  Gross per 24 hour    Intake 1097.72 ml   Output 200 ml   Net 897.72 ml        Physical Exam  Constitutional:       General: He is not in acute distress.     Appearance: Normal appearance. He is normal weight.   HENT:      Head: Normocephalic and atraumatic.      Nose: No congestion.      Mouth/Throat:      Mouth: Mucous membranes are moist.   Eyes:      Extraocular Movements: Extraocular movements intact.      Conjunctiva/sclera: Conjunctivae normal.      Pupils: Pupils are equal, round, and reactive to light.   Neck:      Vascular: No carotid bruit.   Cardiovascular:      Rate and Rhythm: Normal rate and regular rhythm.      Pulses: Normal pulses.      Heart sounds: Normal heart sounds. No murmur heard.     No gallop.   Pulmonary:      Effort: Pulmonary effort is normal. No respiratory distress.      Breath sounds: Normal breath sounds. No wheezing, rhonchi or rales.   Chest:      Chest wall: No tenderness.   Abdominal:      General: Abdomen is flat. Bowel sounds are normal. There is no distension.      Tenderness: There is abdominal tenderness (LUQ).   Musculoskeletal:         General: No swelling.      Cervical back: No rigidity.   Skin:     Capillary Refill: Capillary refill takes less than 2 seconds.   Neurological:      General: No focal deficit present.      Mental Status: He is alert and oriented to person, place, and time. Mental status is at baseline.      Cranial Nerves: No cranial nerve deficit.      Motor: No weakness.   Psychiatric:         Mood and Affect: Mood normal.         Behavior: Behavior normal.          Significant Labs:   All pertinent labs from the last 24 hours have been reviewed.    Diagnostic Results:  I have reviewed all pertinent imaging results/findings within the past 24 hours.

## 2024-05-02 NOTE — H&P
[facility] - Emergency Dept  [facility]  History & Physical    Patient Name: Cj Downing  MRN: [id number]  Patient Class: IP- Inpatient  Admission Date: 5/1/2024  Attending Physician: Jack Barron MD   Primary Care Provider: Karen Tang MD         Patient information was obtained from patient, past medical records, and ER records.     Subjective:     Principal Problem:LUQ abdominal pain    Chief Complaint:   Chief Complaint   Patient presents with    Nausea     +vomiting. Hx. Kidney tx. 2016        HPI: 62 yo M with h/o HTN, CKD s/p transplant (2016), Anemia of chronic renal failure, Secondary hyperparathyroidism, renal; Long-term use of immunosuppressant medication presented to ED for nausea vomiting and abdominal pain.  He reports nausea and vomiting started 3 days ago he has not been able to eat anything for last 3 days because of vomiting anything he eats.  He also reports of abdominal pain left upper quadrant, 10 x 10 intensity, dull, intermittent, nonradiating, no any aggravating or alleviating factor.  He reports he started having a fever today only.  He has not been following with primary care physician or nephrologist because of no insurance.  He mentioned he passes urine inadequate amount, frequency everyday.  He is compliant to his carvedilol, CellCept, Prograf medicines regularly     In the ED, received ceftriaxone, 1L NS iv bolus. Temp 100.9, , RR 21, SaO2 >95% RA, -140s/80-90s. WBC 13.51, Hb 13.6, Platelets 137, Creat 1.5, BNP 32, Trop I 0.026, Lactate 1.5, Procal 0.52, UA Neg, Lipase WNL. Influenza negative. Blood cultures pending.    EKG 5/1/24 sinus rhythm with Premature supraventricular complexes and with occasional Premature ventricular complexes ST and T wave abnormality, consider lateral ischemia    Chest XR 5/1/24 unremarkable.     Abdomen CT pending.       Past Medical History:   Diagnosis Date    Anemia of chronic renal failure 12/3/2013    Blind right eye      Cataract     CKD (chronic kidney disease) stage 2, GFR 60-89 ml/min 2/24/2016    CKD (chronic kidney disease) stage 2, GFR 60-89 ml/min 2/24/2016    ESRD (end stage renal disease) 2008    Hypertension     Hypomagnesemia 1/18/2017    Hypophosphatemia 1/18/2017    Latent TB - 4/7/11 - INH x 9 months 12/3/2013    Pulmonary hypertension - PASP 58mmHg (9/13) 12/3/2013    Pulmonary hypertension - PASP 58mmHg (9/13) improved on recent echo PA pressure of 13 12/3/2013    S/P kidney transplant 1/28/2016     Secondary hyperparathyroidism, renal        Past Surgical History:   Procedure Laterality Date    APPENDECTOMY      AV FISTULA PLACEMENT      EUSEBIO    COLONOSCOPY      CORNEAL TRANSPLANT  01/01/1997    LEFT EYE    CORNEAL TRANSPLANT  01/01/2010    RIGHT EYE    CORNEAL TRANSPLANT  01/01/2000    LEFT EYE    EYE SURGERY      KIDNEY TRANSPLANT  01/28/2016    TONSILLECTOMY         Review of patient's allergies indicates:  No Known Allergies    Current Facility-Administered Medications   Medication Dose Route Frequency Provider Last Rate Last Admin    0.9%  NaCl infusion   Intravenous Continuous Berry Tijerina MD        acetaminophen tablet 650 mg  650 mg Oral Q4H PRN Berry Tijerina MD        acetaminophen tablet 650 mg  650 mg Oral ED 1 Time Nessa Beltran DPM        carvediloL tablet 12.5 mg  12.5 mg Oral BID Berry Tijerina MD        [START ON 5/2/2024] cefTRIAXone (ROCEPHIN) 2 g in dextrose 5 % in water (D5W) 100 mL IVPB (MB+)  2 g Intravenous Q24H Berry Tijerina MD        dextrose 10% bolus 125 mL 125 mL  12.5 g Intravenous PRN Berry Tijerina MD        dextrose 10% bolus 250 mL 250 mL  25 g Intravenous PRN Berry Tijerina MD        glucagon (human recombinant) injection 1 mg  1 mg Intramuscular PRN Berry Tijerina MD        glucose chewable tablet 16 g  16 g Oral PRN Berry Tijerina MD        glucose chewable tablet 24 g  24 g Oral PRN Berry Tijerina MD        insulin aspart U-100 pen 0-5 Units  0-5 Units Subcutaneous QID (AC + HS) PRN Lilliana  MD Berry        metronidazole IVPB 500 mg  500 mg Intravenous Q8H Berry Tijerina MD        naloxone 0.4 mg/mL injection 0.02 mg  0.02 mg Intravenous PRN Berry Tijerina MD        ondansetron injection 4 mg  4 mg Intravenous Q8H PRN Berry Tijerina MD        prochlorperazine injection Soln 5 mg  5 mg Intravenous Q6H PRN Berry Tijerina MD        sodium chloride 0.9% flush 10 mL  10 mL Intravenous Q12H PRN Berry Tijerina MD         Current Outpatient Medications   Medication Sig Dispense Refill    carvediloL (COREG) 12.5 MG tablet Take 1 tablet (12.5 mg total) by mouth 2 (two) times daily. 180 tablet 0    multivitamin (THERAGRAN) tablet Take 1 tablet by mouth once daily. 30 tablet 2    mycophenolate (CELLCEPT) 250 mg Cap Take 2 capsules (500 mg total) by mouth 2 (two) times daily. 120 capsule 11    tacrolimus (PROGRAF) 1 MG Cap Take 2 capsules (2 mg total) by mouth every 12 (twelve) hours. 120 capsule 11     Family History       Problem Relation (Age of Onset)    Cancer Maternal Aunt    Hypertension Mother    Kidney disease Maternal Aunt          Tobacco Use    Smoking status: Never    Smokeless tobacco: Never   Substance and Sexual Activity    Alcohol use: Yes     Comment: beer rarely    Drug use: No    Sexual activity: Yes     Partners: Female     Review of Systems   Constitutional:  Negative for chills, diaphoresis, fatigue and fever.   HENT:  Negative for congestion, ear pain, sore throat, tinnitus and trouble swallowing.    Eyes:  Negative for photophobia, discharge and visual disturbance.   Respiratory:  Negative for apnea, cough, choking, chest tightness and shortness of breath.    Cardiovascular:  Negative for chest pain, palpitations and leg swelling.   Gastrointestinal:  Positive for abdominal pain, nausea and vomiting. Negative for abdominal distention, constipation and diarrhea.   Endocrine: Negative for polydipsia, polyphagia and polyuria.   Genitourinary:  Negative for difficulty urinating and dysuria.    Musculoskeletal:  Negative for arthralgias, back pain and gait problem.   Skin:  Negative for color change and rash.   Neurological:  Negative for dizziness, syncope, speech difficulty, weakness, light-headedness and headaches.   Psychiatric/Behavioral:  Negative for agitation, confusion, dysphoric mood, hallucinations and sleep disturbance. The patient is not nervous/anxious.      Objective:     Vital Signs (Most Recent):  Temp: (!) 101.4 °F (38.6 °C) (05/01/24 2047)  Pulse: 102 (05/01/24 2118)  Resp: 20 (05/01/24 2118)  BP: (!) 132/95 (05/01/24 2118)  SpO2: 96 % (05/01/24 1852) Vital Signs (24h Range):  Temp:  [97.7 °F (36.5 °C)-101.4 °F (38.6 °C)] 101.4 °F (38.6 °C)  Pulse:  [] 102  Resp:  [16-21] 20  SpO2:  [95 %-99 %] 96 %  BP: (130-147)/(84-95) 132/95     Weight: 83.9 kg (185 lb)  Body mass index is 28.98 kg/m².     Physical Exam  Constitutional:       General: He is not in acute distress.     Appearance: Normal appearance. He is normal weight.   HENT:      Head: Normocephalic and atraumatic.      Nose: No congestion.      Mouth/Throat:      Mouth: Mucous membranes are moist.   Eyes:      Extraocular Movements: Extraocular movements intact.      Conjunctiva/sclera: Conjunctivae normal.      Pupils: Pupils are equal, round, and reactive to light.   Neck:      Vascular: No carotid bruit.   Cardiovascular:      Rate and Rhythm: Normal rate and regular rhythm.      Pulses: Normal pulses.      Heart sounds: Normal heart sounds. No murmur heard.     No gallop.   Pulmonary:      Effort: Pulmonary effort is normal. No respiratory distress.      Breath sounds: Normal breath sounds. No wheezing, rhonchi or rales.   Chest:      Chest wall: No tenderness.   Abdominal:      General: Abdomen is flat. Bowel sounds are normal. There is no distension.      Tenderness: There is abdominal tenderness.      Comments: LUQ Tenderness+   Musculoskeletal:         General: No swelling.      Cervical back: No rigidity.   Skin:      Capillary Refill: Capillary refill takes less than 2 seconds.   Neurological:      General: No focal deficit present.      Mental Status: He is alert and oriented to person, place, and time. Mental status is at baseline.      Cranial Nerves: No cranial nerve deficit.      Motor: No weakness.   Psychiatric:         Mood and Affect: Mood normal.         Behavior: Behavior normal.              CRANIAL NERVES     CN III, IV, VI   Pupils are equal, round, and reactive to light.       Significant Labs: All pertinent labs within the past 24 hours have been reviewed.    Significant Imaging: I have reviewed all pertinent imaging results/findings within the past 24 hours.  Assessment/Plan:     * LUQ abdominal pain  62 yo M with h/o HTN, CKD s/p transplant (2016), Anemia of chronic renal failure, Secondary hyperparathyroidism, renal; Long-term use of immunosuppressant medication presented to ED for nausea vomiting and abdominal pain.      - He is meeting SIRS criteria  - Abdomen CT pending.  - In the ED, received ceftriaxone, 1L NS iv bolus  - start ceftriaxone 2 g IV daily, metronidazole  Q 8 hours  - we will monitor change antibiotics according to CT scan abdomen and clinical status  - keep NPO for now due to nausea vomiting, slowly upgrade the diet once nausea vomiting resolved   - PRN IV antiemetics (EKG 5/1/24  ms)      SIRS (systemic inflammatory response syndrome)  - Temp 100.9, , RR 21, SaO2 >95% RA, -140s/80-90s. WBC 13.51, Hb 13.6, Platelets 137, Creat 1.5, BNP 32, Trop I 0.026, Lactate 1.5, Procal 0.52, UA Neg, Lipase WNL. Influenza negative.   - Blood cultures pending.  - check repeat lactate level, we will monitor CT scan report to show any evidence of infection  - normal saline 1 L bolus given in ED. considering his ANTHONY possibly due to acute tubular necrosis normal saline infusion only at the moment 50 cc/hour  - we will modify our treatment according to the clinical status and the  reports of imaging, labs      S/P kidney transplant 1/28/2016  - since starting of antibiotics hold his home medicines CellCept Prograf  - consult Nephrology  - avoid nephrotoxic drugs      Long-term use of immunosuppressant medication  - hold currently      Anemia of chronic renal failure  Patient's anemia is currently controlled. Has not received any PRBCs to date. Etiology likely d/t chronic disease due to Chronic Kidney Disease  Current CBC reviewed-   Lab Results   Component Value Date    HGB 13.6 (L) 05/01/2024    HCT 40.1 05/01/2024     Monitor serial CBC and transfuse if patient becomes hemodynamically unstable, symptomatic or H/H drops below 7/21.    Secondary hyperparathyroidism, renal  - check PTH level      Hypertension  Chronic, controlled. Latest blood pressure and vitals reviewed-     Temp:  [97.7 °F (36.5 °C)-101.4 °F (38.6 °C)]   Pulse:  []   Resp:  [16-21]   BP: (130-147)/(84-95)   SpO2:  [95 %-99 %] .   Home meds for hypertension were reviewed and noted below.   Hypertension Medications               carvediloL (COREG) 12.5 MG tablet Take 1 tablet (12.5 mg total) by mouth 2 (two) times daily.            While in the hospital, will manage blood pressure as follows; Continue home antihypertensive regimen    Will utilize p.r.n. blood pressure medication only if patient's blood pressure greater than 180/110 and he develops symptoms such as worsening chest pain or shortness of breath.      VTE Risk Mitigation (From admission, onward)           Ordered     IP VTE LOW RISK PATIENT  Once         05/01/24 2108     Place sequential compression device  Until discontinued         05/01/24 2108                                    Berry Tijerina MD  Department of Hospital Medicine  [facility] - Emergency Dept

## 2024-05-02 NOTE — PLAN OF CARE
[facility] provided patient with food resources:    Food Pantry by: [employer]    Food Assistance by: [facility] Food Pantry by: [facility]

## 2024-05-02 NOTE — CONSULTS
[facility] - [facility] ([place] [address])  Hematology/Oncology  Consult Note    Patient Name: Cj Downing  MRN: [id number]  Admission Date: 5/1/2024  Hospital Length of Stay: 1 days  Code Status: Full Code   Attending Provider: Alisia Medina MD  Consulting Provider: Ayaz Klein MD  Primary Care Physician: Karen Tang MD  Principal Problem:LUQ abdominal pain    Inpatient consult to Hematology/Oncology  Consult performed by: Aayz Klein MD  Consult ordered by: Berry Tijerina MD        Subjective:     HPI:  Mr. Cj Downing is a 63 y.o. male with CKD s/p transplant in 2016 who is currently admitted with nausea, vomiting, and abdominal pain. He is on cellcept and prograf for immunosuppression. States his symptoms have been present for about 3 days. CT CAP was obtained which revealed possible pyelonephritis of the transplanted kidney and a 3cm soft tissue density in the upper pole of the native right kidney suggestive of renal carcinoma. There were no other lesions seen to suggest metastatic disease.     Oncology Treatment Plan:   [No matching plan found]    Medications:  Continuous Infusions:  Current Facility-Administered Medications   Medication Dose Route Frequency Last Rate Last Admin     Scheduled Meds:  Current Facility-Administered Medications   Medication Dose Route Frequency    carvediloL  12.5 mg Oral BID    cefTRIAXone (Rocephin) IV (PEDS and ADULTS)  2 g Intravenous Q24H    enoxparin  40 mg Subcutaneous Q24H (treatment, non-standard time)    metroNIDAZOLE IV (PEDS and ADULTS)  500 mg Intravenous Q8H    polyethylene glycol  17 g Oral Daily     PRN Meds:  Current Facility-Administered Medications:     acetaminophen, 650 mg, Oral, Q4H PRN    dextrose 10%, 12.5 g, Intravenous, PRN    dextrose 10%, 25 g, Intravenous, PRN    glucagon (human recombinant), 1 mg, Intramuscular, PRN    glucose, 16 g, Oral, PRN    glucose, 24 g, Oral, PRN    insulin aspart U-100, 0-5 Units, Subcutaneous, QID (AC +  HS) PRN    morphine, 2 mg, Intravenous, Q4H PRN    naloxone, 0.02 mg, Intravenous, PRN    ondansetron, 4 mg, Intravenous, Q8H PRN    oxyCODONE, 5 mg, Oral, Q6H PRN    prochlorperazine, 5 mg, Intravenous, Q6H PRN    sodium chloride 0.9%, 10 mL, Intravenous, Q12H PRN     Review of patient's allergies indicates:  No Known Allergies     Past Medical History:   Diagnosis Date    Anemia of chronic renal failure 12/3/2013    Blind right eye     Cataract     CKD (chronic kidney disease) stage 2, GFR 60-89 ml/min 2/24/2016    CKD (chronic kidney disease) stage 2, GFR 60-89 ml/min 2/24/2016    ESRD (end stage renal disease) 2008    Hypertension     Hypomagnesemia 1/18/2017    Hypophosphatemia 1/18/2017    Latent TB - 4/7/11 - INH x 9 months 12/3/2013    Pulmonary hypertension - PASP 58mmHg (9/13) 12/3/2013    Pulmonary hypertension - PASP 58mmHg (9/13) improved on recent echo PA pressure of 13 12/3/2013    S/P kidney transplant 1/28/2016     Secondary hyperparathyroidism, renal      Past Surgical History:   Procedure Laterality Date    APPENDECTOMY      AV FISTULA PLACEMENT      EUSEBIO    COLONOSCOPY      CORNEAL TRANSPLANT  01/01/1997    LEFT EYE    CORNEAL TRANSPLANT  01/01/2010    RIGHT EYE    CORNEAL TRANSPLANT  01/01/2000    LEFT EYE    EYE SURGERY      KIDNEY TRANSPLANT  01/28/2016    TONSILLECTOMY       Family History       Problem Relation (Age of Onset)    Cancer Maternal Aunt    Hypertension Mother    Kidney disease Maternal Aunt          Tobacco Use    Smoking status: Never    Smokeless tobacco: Never   Substance and Sexual Activity    Alcohol use: Yes     Comment: beer rarely    Drug use: No    Sexual activity: Yes     Partners: Female       Review of Systems   Constitutional:  Negative for chills and fever.   HENT:  Negative for congestion and sore throat.    Eyes:  Negative for pain.   Respiratory:  Negative for cough and shortness of breath.    Cardiovascular:  Negative for chest pain, palpitations and leg  swelling.   Gastrointestinal:  Positive for abdominal pain (LUQ). Negative for constipation, diarrhea, nausea and vomiting.   Genitourinary:  Negative for difficulty urinating, dysuria and hematuria.   Musculoskeletal:  Negative for back pain.   Skin:  Negative for rash.   Neurological:  Negative for light-headedness and headaches.   Hematological:  Does not bruise/bleed easily.   Psychiatric/Behavioral:  Negative for agitation.      Objective:     Vital Signs (Most Recent):  Temp: 100.3 °F (37.9 °C) (05/02/24 1134)  Pulse: 99 (05/02/24 1134)  Resp: 18 (05/02/24 1134)  BP: (!) 144/85 (05/02/24 1134)  SpO2: 96 % (05/02/24 1134) Vital Signs (24h Range):  Temp:  [97.7 °F (36.5 °C)-101.4 °F (38.6 °C)] 100.3 °F (37.9 °C)  Pulse:  [] 99  Resp:  [16-21] 18  SpO2:  [95 %-99 %] 96 %  BP: (116-147)/(69-95) 144/85     Weight: 83.9 kg (185 lb)  Body mass index is 28.98 kg/m².  Body surface area is 1.99 meters squared.      Intake/Output Summary (Last 24 hours) at 5/2/2024 1304  Last data filed at 5/2/2024 0505  Gross per 24 hour   Intake 1097.72 ml   Output 200 ml   Net 897.72 ml        Physical Exam  Constitutional:       General: He is not in acute distress.     Appearance: Normal appearance. He is normal weight.   HENT:      Head: Normocephalic and atraumatic.      Nose: No congestion.      Mouth/Throat:      Mouth: Mucous membranes are moist.   Eyes:      Extraocular Movements: Extraocular movements intact.      Conjunctiva/sclera: Conjunctivae normal.      Pupils: Pupils are equal, round, and reactive to light.   Neck:      Vascular: No carotid bruit.   Cardiovascular:      Rate and Rhythm: Normal rate and regular rhythm.      Pulses: Normal pulses.      Heart sounds: Normal heart sounds. No murmur heard.     No gallop.   Pulmonary:      Effort: Pulmonary effort is normal. No respiratory distress.      Breath sounds: Normal breath sounds. No wheezing, rhonchi or rales.   Chest:      Chest wall: No tenderness.    Abdominal:      General: Abdomen is flat. Bowel sounds are normal. There is no distension.      Tenderness: There is abdominal tenderness (LUQ).   Musculoskeletal:         General: No swelling.      Cervical back: No rigidity.   Skin:     Capillary Refill: Capillary refill takes less than 2 seconds.   Neurological:      General: No focal deficit present.      Mental Status: He is alert and oriented to person, place, and time. Mental status is at baseline.      Cranial Nerves: No cranial nerve deficit.      Motor: No weakness.   Psychiatric:         Mood and Affect: Mood normal.         Behavior: Behavior normal.          Significant Labs:   All pertinent labs from the last 24 hours have been reviewed.    Diagnostic Results:  I have reviewed all pertinent imaging results/findings within the past 24 hours.  Assessment/Plan:     Renal mass, right  Patient with CKD s/p transplant in 2016 who was found to have a 3cm soft tissue density in the upper pole of the native right kidney suggestive of renal carcinoma. There were no other lesions seen to suggest metastatic disease.     - Imaging concerning for localized renal cell carcinoma, no signs of metastatic disease on imaging    - Recommend urology consult for tissue diagnosis, may be candidate for partial nephrectomy   - Depending on official diagnosis may need to follow with oncology outpatient         Thank you for your consult. I will follow-up with patient. Please contact us if you have any additional questions.    Ayaz Klein MD  Hematology/Oncology  [facility] - Med Surg ([facility]-16)

## 2024-05-02 NOTE — ASSESSMENT & PLAN NOTE
Patient's anemia is currently controlled. Has not received any PRBCs to date. Etiology likely d/t chronic disease due to Chronic Kidney Disease  Current CBC reviewed-   Lab Results   Component Value Date    HGB 13.6 (L) 05/01/2024    HCT 40.1 05/01/2024     Monitor serial CBC and transfuse if patient becomes hemodynamically unstable, symptomatic or H/H drops below 7/21.

## 2024-05-02 NOTE — ASSESSMENT & PLAN NOTE
Patient with CKD s/p transplant in 2016 who was found to have a 3cm soft tissue density in the upper pole of the native right kidney suggestive of renal carcinoma. There were no other lesions seen to suggest metastatic disease.     - Imaging concerning for localized renal cell carcinoma, no signs of metastatic disease on imaging    - Recommend urology consult for tissue diagnosis, may be candidate for partial nephrectomy   - Depending on official diagnosis may need to follow with oncology outpatient

## 2024-05-02 NOTE — ASSESSMENT & PLAN NOTE
- since starting of antibiotics hold his home medicines CellCept Prograf  - consult Nephrology  - avoid nephrotoxic drugs

## 2024-05-02 NOTE — HPI
Mr. Cj Downing is a 63 y.o. male with CKD s/p transplant in 2016 who is currently admitted with nausea, vomiting, and abdominal pain. He is on cellcept and prograf for immunosuppression. States his symptoms have been present for about 3 days. CT CAP was obtained which revealed possible pyelonephritis of the transplanted kidney and a 3cm soft tissue density in the upper pole of the native right kidney suggestive of renal carcinoma. There were no other lesions seen to suggest metastatic disease.

## 2024-05-02 NOTE — PLAN OF CARE
[facility] - Med Surg ([facility]  Initial Discharge Assessment       Primary Care Provider: Karen Tang MD    Admission Diagnosis: Immunosuppression [D84.9]  Chest pain [R07.9]  Sepsis [A41.9]    Admission Date: 5/1/2024  Expected Discharge Date: 5/4/2024    Transition of Care Barriers: (P) None    Payor: [place] [employer] / Plan: [facility] EXCHANGE PLAN / Product Type: Commercial /     Extended Emergency Contact Information  Primary Emergency Contact: Nolvia Tapia   [place]  Home Phone: [phone]  Work Phone: [phone]  Mobile Phone: [phone]  Relation: Significant other   needed? No    Discharge Plan A: (P) Home  Discharge Plan B: (P) Home      [facility]  [address]  [place] [address]  Phone: [phone] Fax: [phone]    [facility] - [address]   [facility]   [facility] [address]  Phone: [phone] Fax: [phone]    [facility] [place], [place] - [address] [facility]  [address] [facility] [address]  Phone: [phone] Fax: [phone]    [id number] [place], [place] - [address]  [facility] [address]  [place] [address]  Phone: [phone] Fax: [phone]      Initial Assessment (most recent)       Adult Discharge Assessment - 05/02/24 1117          Discharge Assessment    Assessment Type Discharge Planning Assessment (P)      Confirmed/corrected address, phone number and insurance Yes (P)      Confirmed Demographics Correct on Facesheet (P)      Source of Information patient (P)      Communicated KENA with patient/caregiver Date not available/Unable to determine (P)      Reason For Admission LUQ abd pain (P)      People in Home alone (P)      Facility Arrived From: home (P)      Do you expect to return to your current living situation? Yes (P)      Do you have help  at home or someone to help you manage your care at home? No (P)      Prior to hospitilization cognitive status: Unable to Assess (P)      Current cognitive status: Alert/Oriented (P)      Walking or Climbing Stairs Difficulty no (P)      Dressing/Bathing Difficulty no (P)      Home Layout Able to live on 1st floor (P)      Equipment Currently Used at Home none (P)      Readmission within 30 days? No (P)      Do you currently have service(s) that help you manage your care at home? No (P)      Do you take prescription medications? Yes (P)      Do you have prescription coverage? Yes (P)      Coverage [facility] (P)      Do you have any problems affording any of your prescribed medications? No (P)      Who is going to help you get home at discharge? friend (P)      How do you get to doctors appointments? car, drives self (P)      Are you on dialysis? No (P)      Do you take coumadin? No (P)      Discharge Plan A Home (P)      Discharge Plan B Home (P)      DME Needed Upon Discharge  none (P)      Discharge Plan discussed with: Patient (P)      Transition of Care Barriers None (P)         Physical Activity    On average, how many days per week do you engage in moderate to strenuous exercise (like a brisk walk)? 0 days (P)      On average, how many minutes do you engage in exercise at this level? 0 min (P)         Financial Resource Strain    How hard is it for you to pay for the very basics like food, housing, medical care, and heating? Not very hard (P)         Housing Stability    In the last 12 months, was there a time when you were not able to pay the mortgage or rent on time? No (P)      At any time in the past 12 months, were you homeless or living in a shelter (including now)? No (P)         Transportation Needs    In the past 12 months, has lack of transportation kept you from medical appointments or from getting medications? No (P)      In the past 12 months, has lack of transportation kept you from meetings, work, or  from getting things needed for daily living? No (P)         Food Insecurity    Within the past 12 months, you worried that your food would run out before you got the money to buy more. Sometimes true (P)      Within the past 12 months, the food you bought just didn't last and you didn't have money to get more. Sometimes true (P)         Stress    Do you feel stress - tense, restless, nervous, or anxious, or unable to sleep at night because your mind is troubled all the time - these days? To some extent (P)         Alcohol Use    Q1: How often do you have a drink containing alcohol? Never (P)      Q2: How many drinks containing alcohol do you have on a typical day when you are drinking? Patient does not drink (P)      Q3: How often do you have six or more drinks on one occasion? Never (P)                  CM spoke with pt in room.  Pt lives alone in [address] [place] [address] apt no elevator, has no trouble navigating stairs, came from home.  A friend will give him a ride home, and he drives himself to MD appts.  No 30D readmission.  No HH, DME, coumadin, or HD.  Independent with bathing, dressing.  Pharmacy: mail order.    3:43 PM  CM spoke with pt in room.  He wants to know what he can do to apply for short term disability since he's not able to work right now.  Instructed pt to call his employer, see if short term disability is a benefit, or call social security to get more information on whether or not he would qualify.  Pt v/u.    AYUSH Foster, BS, RN, [facility]      Discharge Plan A and Plan B have been determined by review of patient's clinical status, future medical and therapeutic needs, and coverage/benefits for post-acute care in coordination with multidisciplinary team members.

## 2024-05-02 NOTE — CARE UPDATE
CT A/P 5/1/24 shows  1. Transplant kidney in the right iliac fossa with mildly striated enhancement pattern which can be seen with pyelonephritis of the transplant kidney.    2. 3 cm soft tissue density mass in the upper pole of the native right kidney suggesting renal carcinoma.    3. Small hiatal hernia.  4. Mild prostatomegaly.  5. Multiple probable chronic mild compression fractures of the thoracolumbar spine.    - Continue IV ABX  - we will consult oncology for right renal mass

## 2024-05-02 NOTE — ASSESSMENT & PLAN NOTE
Chronic, controlled. Latest blood pressure and vitals reviewed-     Temp:  [97.7 °F (36.5 °C)-101.4 °F (38.6 °C)]   Pulse:  []   Resp:  [16-21]   BP: (130-147)/(84-95)   SpO2:  [95 %-99 %] .   Home meds for hypertension were reviewed and noted below.   Hypertension Medications               carvediloL (COREG) 12.5 MG tablet Take 1 tablet (12.5 mg total) by mouth 2 (two) times daily.            While in the hospital, will manage blood pressure as follows; Continue home antihypertensive regimen    Will utilize p.r.n. blood pressure medication only if patient's blood pressure greater than 180/110 and he develops symptoms such as worsening chest pain or shortness of breath.

## 2024-05-02 NOTE — ASSESSMENT & PLAN NOTE
- Temp 100.9, , RR 21, SaO2 >95% RA, -140s/80-90s. WBC 13.51, Hb 13.6, Platelets 137, Creat 1.5, BNP 32, Trop I 0.026, Lactate 1.5, Procal 0.52, UA Neg, Lipase WNL. Influenza negative.   - Blood cultures pending.  - check repeat lactate level, we will monitor CT scan report to show any evidence of infection  - normal saline 1 L bolus given in ED. considering his ANTHONY possibly due to acute tubular necrosis normal saline infusion only at the moment 50 cc/hour  - we will modify our treatment according to the clinical status and the reports of imaging, labs

## 2024-05-03 ENCOUNTER — TELEPHONE (OUTPATIENT)
Dept: FAMILY MEDICINE | Facility: CLINIC | Age: 64
End: 2024-05-03
Payer: MEDICAID

## 2024-05-03 VITALS
SYSTOLIC BLOOD PRESSURE: 119 MMHG | WEIGHT: 185 LBS | DIASTOLIC BLOOD PRESSURE: 73 MMHG | OXYGEN SATURATION: 97 % | BODY MASS INDEX: 28.98 KG/M2 | RESPIRATION RATE: 17 BRPM | TEMPERATURE: 99 F | HEART RATE: 71 BPM

## 2024-05-03 PROBLEM — N12 PYELONEPHRITIS: Status: ACTIVE | Noted: 2024-05-03

## 2024-05-03 LAB
ANION GAP SERPL CALC-SCNC: 7 MMOL/L (ref 8–16)
BASOPHILS # BLD AUTO: 0.04 K/UL (ref 0–0.2)
BASOPHILS NFR BLD: 0.5 % (ref 0–1.9)
BUN SERPL-MCNC: 19 MG/DL (ref 8–23)
CALCIUM SERPL-MCNC: 9.3 MG/DL (ref 8.7–10.5)
CHLORIDE SERPL-SCNC: 104 MMOL/L (ref 95–110)
CO2 SERPL-SCNC: 25 MMOL/L (ref 23–29)
CREAT SERPL-MCNC: 1.5 MG/DL (ref 0.5–1.4)
DIFFERENTIAL METHOD BLD: ABNORMAL
EOSINOPHIL # BLD AUTO: 0.1 K/UL (ref 0–0.5)
EOSINOPHIL NFR BLD: 0.9 % (ref 0–8)
ERYTHROCYTE [DISTWIDTH] IN BLOOD BY AUTOMATED COUNT: 13.8 % (ref 11.5–14.5)
EST. GFR  (NO RACE VARIABLE): 51.7 ML/MIN/1.73 M^2
GLUCOSE SERPL-MCNC: 87 MG/DL (ref 70–110)
HCT VFR BLD AUTO: 32.4 % (ref 40–54)
HGB BLD-MCNC: 11.1 G/DL (ref 14–18)
IMM GRANULOCYTES # BLD AUTO: 0.02 K/UL (ref 0–0.04)
IMM GRANULOCYTES NFR BLD AUTO: 0.3 % (ref 0–0.5)
LYMPHOCYTES # BLD AUTO: 0.9 K/UL (ref 1–4.8)
LYMPHOCYTES NFR BLD: 11.6 % (ref 18–48)
MAGNESIUM SERPL-MCNC: 2 MG/DL (ref 1.6–2.6)
MCH RBC QN AUTO: 29.7 PG (ref 27–31)
MCHC RBC AUTO-ENTMCNC: 34.3 G/DL (ref 32–36)
MCV RBC AUTO: 87 FL (ref 82–98)
MONOCYTES # BLD AUTO: 1.2 K/UL (ref 0.3–1)
MONOCYTES NFR BLD: 15.9 % (ref 4–15)
NEUTROPHILS # BLD AUTO: 5.4 K/UL (ref 1.8–7.7)
NEUTROPHILS NFR BLD: 70.8 % (ref 38–73)
NRBC BLD-RTO: 0 /100 WBC
PHOSPHATE SERPL-MCNC: 1.9 MG/DL (ref 2.7–4.5)
PLATELET # BLD AUTO: 144 K/UL (ref 150–450)
PMV BLD AUTO: 11.8 FL (ref 9.2–12.9)
POCT GLUCOSE: 98 MG/DL (ref 70–110)
POTASSIUM SERPL-SCNC: 3.3 MMOL/L (ref 3.5–5.1)
RBC # BLD AUTO: 3.74 M/UL (ref 4.6–6.2)
SODIUM SERPL-SCNC: 136 MMOL/L (ref 136–145)
TACROLIMUS BLD-MCNC: 2.6 NG/ML (ref 5–15)
WBC # BLD AUTO: 7.61 K/UL (ref 3.9–12.7)

## 2024-05-03 PROCEDURE — 84100 ASSAY OF PHOSPHORUS: CPT | Performed by: STUDENT IN AN ORGANIZED HEALTH CARE EDUCATION/TRAINING PROGRAM

## 2024-05-03 PROCEDURE — 63600175 PHARM REV CODE 636 W HCPCS: Mod: JZ,JG | Performed by: STUDENT IN AN ORGANIZED HEALTH CARE EDUCATION/TRAINING PROGRAM

## 2024-05-03 PROCEDURE — 99223 1ST HOSP IP/OBS HIGH 75: CPT | Mod: ,,, | Performed by: INTERNAL MEDICINE

## 2024-05-03 PROCEDURE — 63600175 PHARM REV CODE 636 W HCPCS: Performed by: INTERNAL MEDICINE

## 2024-05-03 PROCEDURE — 94761 N-INVAS EAR/PLS OXIMETRY MLT: CPT

## 2024-05-03 PROCEDURE — 83735 ASSAY OF MAGNESIUM: CPT | Performed by: STUDENT IN AN ORGANIZED HEALTH CARE EDUCATION/TRAINING PROGRAM

## 2024-05-03 PROCEDURE — 80048 BASIC METABOLIC PNL TOTAL CA: CPT | Performed by: STUDENT IN AN ORGANIZED HEALTH CARE EDUCATION/TRAINING PROGRAM

## 2024-05-03 PROCEDURE — 85025 COMPLETE CBC W/AUTO DIFF WBC: CPT | Performed by: STUDENT IN AN ORGANIZED HEALTH CARE EDUCATION/TRAINING PROGRAM

## 2024-05-03 PROCEDURE — 80197 ASSAY OF TACROLIMUS: CPT | Performed by: INTERNAL MEDICINE

## 2024-05-03 PROCEDURE — 25000003 PHARM REV CODE 250: Performed by: INTERNAL MEDICINE

## 2024-05-03 PROCEDURE — 25000003 PHARM REV CODE 250: Performed by: STUDENT IN AN ORGANIZED HEALTH CARE EDUCATION/TRAINING PROGRAM

## 2024-05-03 PROCEDURE — 36415 COLL VENOUS BLD VENIPUNCTURE: CPT | Performed by: INTERNAL MEDICINE

## 2024-05-03 RX ORDER — CEFPODOXIME PROXETIL 100 MG/1
200 TABLET, FILM COATED ORAL 2 TIMES DAILY
Qty: 40 TABLET | Refills: 0 | Status: SHIPPED | OUTPATIENT
Start: 2024-05-03 | End: 2024-05-03

## 2024-05-03 RX ORDER — MYCOPHENOLATE MOFETIL 250 MG/1
500 CAPSULE ORAL 2 TIMES DAILY
Start: 2024-05-14 | End: 2025-05-15

## 2024-05-03 RX ORDER — CEFPODOXIME PROXETIL 200 MG/1
200 TABLET, FILM COATED ORAL 2 TIMES DAILY
Qty: 22 TABLET | Refills: 0 | Status: SHIPPED | OUTPATIENT
Start: 2024-05-03 | End: 2024-05-14

## 2024-05-03 RX ORDER — PANTOPRAZOLE SODIUM 40 MG/1
40 TABLET, DELAYED RELEASE ORAL DAILY
Qty: 90 TABLET | Refills: 3 | Status: SHIPPED | OUTPATIENT
Start: 2024-05-03 | End: 2025-05-03

## 2024-05-03 RX ORDER — CARVEDILOL 12.5 MG/1
12.5 TABLET ORAL 2 TIMES DAILY
Qty: 180 TABLET | Refills: 0 | Status: SHIPPED | OUTPATIENT
Start: 2024-05-03 | End: 2024-08-01

## 2024-05-03 RX ADMIN — CARVEDILOL 12.5 MG: 12.5 TABLET, FILM COATED ORAL at 08:05

## 2024-05-03 RX ADMIN — POTASSIUM PHOSPHATE, MONOBASIC AND POTASSIUM PHOSPHATE, DIBASIC 15 MMOL: 224; 236 INJECTION, SOLUTION, CONCENTRATE INTRAVENOUS at 01:05

## 2024-05-03 RX ADMIN — METRONIDAZOLE 500 MG: 500 INJECTION, SOLUTION INTRAVENOUS at 05:05

## 2024-05-03 RX ADMIN — TACROLIMUS 2 MG: 1 CAPSULE ORAL at 08:05

## 2024-05-03 RX ADMIN — PANTOPRAZOLE SODIUM 40 MG: 40 TABLET, DELAYED RELEASE ORAL at 08:05

## 2024-05-03 NOTE — CONSULTS
[facility]oe - Med Surg ([place] [address])  Infectious Disease  Consult Note    Patient Name: Cj Downing  MRN: [id number]  Admission Date: 5/1/2024  Hospital Length of Stay: 2 days  Attending Physician: Alisia Medina MD  Primary Care Provider: Karen Tang MD   Isolation Status: No active isolations  Patient information was obtained from patient and ER records.      Inpatient consult to Infectious Diseases  Consult performed by: Ortiz Brewer MD  Consult ordered by: Alisia Medina MD        Assessment/Plan:     ID  Possible Pyelonephritis  History of L kidney transplant in 2016 for HTN nephropathy  - Presents with N/V and LUQ abdominal pain; febrile to 101 shortly after arrival; leukocytosis to 13k  - CT C/A/P showed R transplant kidney with mildly striated enhancement.  - R native kidney w/ atrophy and a 3cm soft tissue density, concerning for RCC  - L native kidney w/ atrophy and small cyst  - UA generally bland (9 WBC's, LE-, N-), blood cultures negative.  Reports frequency and hesitancy but no outright burning or back pain     Patient already  reports significant improvement in symptoms on current regimen.  No pathognomic symptoms and bland UA, if infective pyelonephritis is present, then likely mild.  Would suggest completing course with PO regimen while working up mass    Recommendations::  -- OK to narrow to cefpodoxime 100mg q12hr  -- Treat for 14 day course (5/1 - 5/14)  -- Nystatin swish and spit for possible mild thrush        Thank you for your consult. I will follow-up with patient. Please contact us if you have any additional questions.    Ortiz Brewer MD  Infectious Disease  Subjective:     Principal Problem: SIRS (systemic inflammatory response syndrome)    HPI: 62yo M with a PMH significant for kidney transplant in 2016 secondary to hypertensive nephropathy and now CKD; latent TB s/p 9mo ING (2013), pulmonary hypertension, cataracts, previous HSV keratitis, and previous BK viremia who  "presents with LUQ abdominal pain, nausea, and vomiting.  The patient recalls eating a pork chop that was in his freezer for "10 years," and feels like his symptoms occurred in the following day.  He became febrile, tachycardic, and poor PO intake, which led to his presentation to [facility].  On arrival, he was found to be febrile to 101 and tachycardic above 100.  Leukocytosis to 13k noted on arrival with a mild/possible ANTHONY w/ Cr of 1.5 (baseline 1.2 - 1.4).  CXR showed no acute cardiopulmonary processes.  CT C/A/P was then completed, and showed the transplant kidney with a mildly striated enhancement - suggestive of pyelonephritis.  When inquired about urinary symptoms; the patient reports issues with frequency and some hesitency, but denies outright dysuria.  The patient was started on ceftriaxone on arrival.  UA obtained was generally bland, with 9 WBCs/LE-/Nitrate-.  Also of note - the patient's CT found a 3cm enhancing mass in the upper pole of his native, right kidney; concerning for renal cell carcinoma.  Oncology was consulted, who suggested urology evaluation, which is pending.  Given his lack of pathognomic urinary symptoms and normal UA, but with abnormal CT and some associated urinary symptoms, ID was consulted to assist with antibiotic management.        Past Medical History:   Diagnosis Date    Anemia of chronic renal failure 12/3/2013    Blind right eye     Cataract     CKD (chronic kidney disease) stage 2, GFR 60-89 ml/min 2/24/2016    CKD (chronic kidney disease) stage 2, GFR 60-89 ml/min 2/24/2016    ESRD (end stage renal disease) 2008    Hypertension     Hypomagnesemia 1/18/2017    Hypophosphatemia 1/18/2017    Latent TB - 4/7/11 - INH x 9 months 12/3/2013    Pulmonary hypertension - PASP 58mmHg (9/13) 12/3/2013    Pulmonary hypertension - PASP 58mmHg (9/13) improved on recent echo PA pressure of 13 12/3/2013    S/P kidney transplant 1/28/2016     Secondary hyperparathyroidism, renal        Past " Surgical History:   Procedure Laterality Date    APPENDECTOMY      AV FISTULA PLACEMENT      EUSEBIO    COLONOSCOPY      CORNEAL TRANSPLANT  01/01/1997    LEFT EYE    CORNEAL TRANSPLANT  01/01/2010    RIGHT EYE    CORNEAL TRANSPLANT  01/01/2000    LEFT EYE    EYE SURGERY      KIDNEY TRANSPLANT  01/28/2016    TONSILLECTOMY         Review of patient's allergies indicates:  No Known Allergies    Medications:  Current Facility-Administered Medications   Medication Dose Route Frequency Provider Last Rate Last Admin    acetaminophen tablet 650 mg  650 mg Oral Q4H PRN Berry Tijerina MD   650 mg at 05/02/24 1701    carvediloL tablet 12.5 mg  12.5 mg Oral BID Berry Tijerina MD   12.5 mg at 05/03/24 0813    cefTRIAXone (ROCEPHIN) 2 g in dextrose 5 % in water (D5W) 100 mL IVPB (MB+)  2 g Intravenous Q24H Berry Tijerina MD   Stopped at 05/02/24 1728    dextrose 10% bolus 125 mL 125 mL  12.5 g Intravenous PRN Berry Tijerina MD        dextrose 10% bolus 250 mL 250 mL  25 g Intravenous PRN Berry Tijerina MD        enoxaparin injection 40 mg  40 mg Subcutaneous Q24H (treatment, non-standard time) Berry Tijerina MD   40 mg at 05/02/24 2213    glucagon (human recombinant) injection 1 mg  1 mg Intramuscular PRN Berry Tijerina MD        glucose chewable tablet 16 g  16 g Oral PRN Berry Tijerina MD        glucose chewable tablet 24 g  24 g Oral PRN Berry Tijerina MD        insulin aspart U-100 pen 0-5 Units  0-5 Units Subcutaneous QID (AC + HS) PRN Berry Tijerina MD        morphine injection 2 mg  2 mg Intravenous Q4H PRN Jonathan Berger., DO   2 mg at 05/02/24 0924    naloxone 0.4 mg/mL injection 0.02 mg  0.02 mg Intravenous PRN Berry Tijerina MD        ondansetron injection 4 mg  4 mg Intravenous Q8H PRN Berry Tijerina MD   4 mg at 05/02/24 0917    oxyCODONE immediate release tablet 5 mg  5 mg Oral Q6H PRN Jonathan Berger K., DO   5 mg at 05/02/24 0640    pantoprazole EC tablet 40 mg  40 mg Oral Daily Cheuk, Alisia W., MD   40 mg at 05/03/24 1545    polyethylene glycol  packet 17 g  17 g Oral Daily Jonathan Berger DO        potassium phosphate 15 mmol in dextrose 5 % (D5W) 250 mL infusion  15 mmol Intravenous Once Alisia Medina MD 62.5 mL/hr at 05/03/24 1305 15 mmol at 05/03/24 1305    prochlorperazine injection Soln 5 mg  5 mg Intravenous Q6H PRN Berry Tijerina MD        sodium chloride 0.9% flush 10 mL  10 mL Intravenous Q12H PRN Berry Tijerina MD        tacrolimus capsule 2 mg  2 mg Oral BID NotSita MD   2 mg at 05/03/24 0813     Antibiotics (From admission, onward)      Start     Stop Route Frequency Ordered    05/02/24 1700  cefTRIAXone (ROCEPHIN) 2 g in dextrose 5 % in water (D5W) 100 mL IVPB (MB+)  (Intra-abdominal Infection)         05/08/24 1659 IV Every 24 hours (non-standard times) 05/01/24 2115          Antifungals (From admission, onward)      None          Antivirals (From admission, onward)      None             Immunization History   Administered Date(s) Administered    COVID-19, MRNA, LN-S, PF (Pfizer) (Purple Cap) 07/26/2021, 08/16/2021    Hepatitis A / Hepatitis B 04/07/2011    Influenza - Quadrivalent - PF *Preferred* (6 months and older) 10/03/2018    Influenza A (H1N1) 2009 Monovalent - IM - PF 02/12/2010       Family History       Problem Relation (Age of Onset)    Cancer Maternal Aunt    Hypertension Mother    Kidney disease Maternal Aunt          Social History     Socioeconomic History    Marital status: Single   Tobacco Use    Smoking status: Never    Smokeless tobacco: Never   Substance and Sexual Activity    Alcohol use: Yes     Comment: beer rarely    Drug use: No    Sexual activity: Yes     Partners: Female     Social Determinants of Health     Financial Resource Strain: Low Risk  (5/3/2024)    Overall Financial Resource Strain (CARDIA)     Difficulty of Paying Living Expenses: Not very hard   Food Insecurity: Food Insecurity Present (5/3/2024)    Hunger Vital Sign     Worried About Running Out of Food in the Last Year: Sometimes true      Ran Out of Food in the Last Year: Sometimes true   Transportation Needs: No Transportation Needs (5/3/2024)    PRAPARE - Transportation     Lack of Transportation (Medical): No     Lack of Transportation (Non-Medical): No   Physical Activity: Inactive (5/2/2024)    Exercise Vital Sign     Days of Exercise per Week: 0 days     Minutes of Exercise per Session: 0 min   Stress: Stress Concern Present (5/3/2024)    [employer] - Occupational Stress Questionnaire     Feeling of Stress : To some extent   Housing Stability: Low Risk  (5/3/2024)    Housing Stability Vital Sign     Unable to Pay for Housing in the Last Year: No     Homeless in the Last Year: No     Objective:     Vital Signs (Most Recent):  Temp: 98.8 °F (37.1 °C) (05/03/24 1204)  Pulse: 67 (05/03/24 1204)  Resp: 17 (05/03/24 0823)  BP: 114/73 (05/03/24 1204)  SpO2: 96 % (05/03/24 1204) Vital Signs (24h Range):  Temp:  [98.2 °F (36.8 °C)-101 °F (38.3 °C)] 98.8 °F (37.1 °C)  Pulse:  [] 67  Resp:  [16-17] 17  SpO2:  [95 %-100 %] 96 %  BP: (106-136)/(66-81) 114/73     Weight: 83.9 kg (185 lb)  Body mass index is 28.98 kg/m².    CrCl cannot be calculated (Unknown ideal weight.).     Physical Exam  Vitals and nursing note reviewed.   Constitutional:       General: He is not in acute distress.     Appearance: Normal appearance. He is not ill-appearing or toxic-appearing.   HENT:      Head: Normocephalic and atraumatic.      Mouth/Throat:      Mouth: Mucous membranes are moist.      Comments: Mild white layer on tongue  Eyes:      General: No scleral icterus.     Conjunctiva/sclera: Conjunctivae normal.   Cardiovascular:      Rate and Rhythm: Normal rate and regular rhythm.      Heart sounds: Normal heart sounds. No murmur heard.  Pulmonary:      Effort: Pulmonary effort is normal. No respiratory distress.      Breath sounds: Normal breath sounds. No wheezing or rhonchi.   Abdominal:      General: There is no distension.       Palpations: Abdomen is soft.      Tenderness: There is no abdominal tenderness.   Musculoskeletal:      Cervical back: No rigidity.   Lymphadenopathy:      Cervical: No cervical adenopathy.   Skin:     General: Skin is warm and dry.   Neurological:      General: No focal deficit present.      Mental Status: He is alert and oriented to person, place, and time.   Psychiatric:         Mood and Affect: Mood normal.         Behavior: Behavior normal.          Significant Labs:   Microbiology Results (last 7 days)       Procedure Component Value Units Date/Time    Blood culture x two cultures. Draw prior to antibiotics. [[id number]] Collected: 05/01/24 1619    Order Status: Completed Specimen: Blood from Peripheral, Antecubital, Right Updated: 05/02/24 1812     Blood Culture, Routine No Growth to date      No Growth to date    Narrative:      Aerobic and anaerobic    Blood culture x two cultures. Draw prior to antibiotics. [[id number]] Collected: 05/01/24 1619    Order Status: Completed Specimen: Blood from Peripheral, Antecubital, Right Updated: 05/02/24 1812     Blood Culture, Routine No Growth to date      No Growth to date    Narrative:      Aerobic and anaerobic    Influenza A & B by Molecular [[id number]] Collected: 05/01/24 1643    Order Status: Completed Specimen: Nasopharyngeal Swab Updated: 05/01/24 1738     Influenza A, Molecular Negative     Influenza B, Molecular Negative     Flu A & B Source Nasal swab            Significant Imaging: I have reviewed all pertinent imaging results/findings within the past 24 hours.

## 2024-05-03 NOTE — PLAN OF CARE
Problem: Adult Inpatient Plan of Care  Goal: Plan of Care Review  Outcome: Progressing  Goal: Patient-Specific Goal (Individualized)  Outcome: Progressing  Goal: Optimal Comfort and Wellbeing  Outcome: Progressing  Goal: Readiness for Transition of Care  Outcome: Progressing

## 2024-05-03 NOTE — ASSESSMENT & PLAN NOTE
Pyelonephritis of transplant kidney  - Temp 100.9, , RR 21, SaO2 >95% RA, -140s/80-90s. WBC 13.51, Hb 13.6, Platelets 137, Creat 1.5, BNP 32, Trop I 0.026, Lactate 1.5, Procal 0.52, UA Neg, Lipase WNL. Influenza negative.   - Blood cultures pending.  - CT abdomen and pelvis showed pyelonephritis of transplant kidney  -ID consult

## 2024-05-03 NOTE — PLAN OF CARE
CHW called [facility] @ [phone] to schedule appt for patient. Rep stated to fax Urology referral to [phone] and they will call the patient to schedule appt.

## 2024-05-03 NOTE — ASSESSMENT & PLAN NOTE
- since starting of antibiotics hold cellcept  - consult kidney transplant medicine  - avoid nephrotoxic drugs  - resume prograf

## 2024-05-03 NOTE — SUBJECTIVE & OBJECTIVE
Subjective:     History of Present Illness:   62 yo male psot kidney transplant in 2016 presented to hospital with N/V, LUQ abdominal pain, and fever on day of admission, meeting SIRS criteria. UA noteworthy for 2+ blood and protein, w/o nitrites but 9 WBCs/hpf.    His baseline creatinine is 1.3-1.5. He reports taking tacrolimus 2 mg bid and  mg BID.    CT a/p with IV contrast had noteworthy findings of native & transplant kidneys:  Kidneys: There is a 3 cm soft tissue density enhancing mass at the upper pole of the native right kidney suggesting renal carcinoma.  Follow-up recommended.  Few probable simple cyst in the right kidney also.  Small subcentimeter cyst in the left native kidney.  There is moderate atrophy of the native kidneys bilaterally.  No stone or hydronephrosis bilaterally.  Transplant kidney noted in the right iliac fossa.  Minimally striated enhancement pattern could represent pyelonephritis of the transplant kidney and follow-up is recommended.  No stone hydronephrosis of the transplant kidney.    Unfortunately, he has not followed with nephrology or transplant medicine in several years due to lack of insurance. He does report compliance with his immunosuppression medication. Labs on arrival show that he is currently at his baseline renal function.     Mr. Downing is a 63 y.o. year old male who is status post Kidney Transplant - 1/28/2016 - Not Followed  (#1).    his maintenance immunosuppression consists of:   Immunosuppressants (From admission, onward)      Start     Stop Route Frequency Ordered    05/02/24 1800  tacrolimus capsule 2 mg         -- Oral 2 times daily 05/02/24 1741            Interval History:      Past Medical and Surgical History: Mr. Downing has a past medical history of Anemia of chronic renal failure (12/3/2013), Blind right eye, Cataract, CKD (chronic kidney disease) stage 2, GFR 60-89 ml/min (2/24/2016), CKD (chronic kidney disease) stage 2, GFR 60-89 ml/min  (2/24/2016), ESRD (end stage renal disease) (2008), Hypertension, Hypomagnesemia (1/18/2017), Hypophosphatemia (1/18/2017), Latent TB - 4/7/11 - INH x 9 months (12/3/2013), Pulmonary hypertension - PASP 58mmHg (9/13) (12/3/2013), Pulmonary hypertension - PASP 58mmHg (9/13) improved on recent echo PA pressure of 13 (12/3/2013), S/P kidney transplant 1/28/2016, and Secondary hyperparathyroidism, renal.  He has a past surgical history that includes Corneal transplant (01/01/1997); Corneal transplant (01/01/2010); Corneal transplant (01/01/2000); AV fistula placement; Tonsillectomy; Colonoscopy; Eye surgery; Appendectomy; and Kidney transplant (01/28/2016).    Past Social and Family History: Mr. Downing reports that he has never smoked. He has never used smokeless tobacco. He reports current alcohol use. He reports that he does not use drugs.His family history includes Cancer in his maternal aunt; Hypertension in his mother; Kidney disease in his maternal aunt.    Intake/Output - Last 3 Shifts         05/01 0700  05/02 0659 05/02 0700  05/03 0659 05/03 0700  05/04 0659    P.O.  540     IV Piggyback 1097.7      Total Intake(mL/kg) 1097.7 (13.1) 540 (6.4)     Urine (mL/kg/hr) 200 1100 (0.5)     Emesis/NG output 0      Stool 0      Total Output 200 1100     Net +897.7 -560            Urine Occurrence  1 x     Stool Occurrence  0 x     Emesis Occurrence  0 x              Review of Systems   Constitutional:  Negative for chills, fatigue and fever.   Respiratory:  Negative for cough, shortness of breath and wheezing.    Cardiovascular:  Negative for chest pain, palpitations and leg swelling.   Gastrointestinal:  Positive for abdominal distention, nausea and vomiting. Negative for constipation and diarrhea.   Endocrine: Negative for polyuria.   Genitourinary:  Negative for dysuria.   Neurological:  Negative for tremors, weakness and numbness.     Objective:     Vital Signs (Most Recent):  Temp: 98.8 °F (37.1 °C) (05/03/24  1204)  Pulse: 67 (05/03/24 1204)  Resp: 17 (05/03/24 0823)  BP: 114/73 (05/03/24 1204)  SpO2: 96 % (05/03/24 1204) Vital Signs (24h Range):  Temp:  [98.2 °F (36.8 °C)-101 °F (38.3 °C)] 98.8 °F (37.1 °C)  Pulse:  [66-83] 67  Resp:  [16-17] 17  SpO2:  [95 %-100 %] 96 %  BP: (106-136)/(66-81) 114/73     Weight: 83.9 kg (185 lb)     Body mass index is 28.98 kg/m².     Physical Exam  Vitals and nursing note reviewed.   Constitutional:       Appearance: Normal appearance.   HENT:      Head: Normocephalic and atraumatic.      Right Ear: External ear normal.      Left Ear: External ear normal.      Nose: Nose normal.      Mouth/Throat:      Mouth: Mucous membranes are moist.   Eyes:      Extraocular Movements: Extraocular movements intact.   Cardiovascular:      Rate and Rhythm: Normal rate and regular rhythm.      Pulses: Normal pulses.      Heart sounds: Normal heart sounds.   Pulmonary:      Effort: Pulmonary effort is normal.      Breath sounds: Normal breath sounds.   Abdominal:      General: Abdomen is flat.      Palpations: Abdomen is soft.   Musculoskeletal:         General: Normal range of motion.      Cervical back: Normal range of motion and neck supple.      Right lower leg: No edema.      Left lower leg: No edema.   Skin:     General: Skin is warm and dry.      Capillary Refill: Capillary refill takes less than 2 seconds.   Neurological:      General: No focal deficit present.      Mental Status: He is alert and oriented to person, place, and time.   Psychiatric:         Mood and Affect: Mood normal.         Behavior: Behavior normal.         Thought Content: Thought content normal.         Judgment: Judgment normal.          Significant Labs:  CBC:   Recent Labs   Lab 05/01/24  1541 05/02/24  0645 05/03/24  0602   WBC 13.51* 10.59 7.61   RBC 4.69 4.62 3.74*   HGB 13.6* 13.5* 11.1*   HCT 40.1 41.1 32.4*   * 123* 144*   MCV 86 89 87   MCH 29.0 29.2 29.7   MCHC 33.9 32.8 34.3     CMP:   Recent Labs   Lab  05/01/24  1541 05/02/24  0645 05/03/24  0602   GLU 90 81 87   CALCIUM 10.8* 10.2 9.3   ALBUMIN 3.6  --   --    PROT 8.7*  --   --     137 136   K 3.6 3.9 3.3*   CO2 25 22* 25    104 104   BUN 18 21 19   CREATININE 1.5* 1.6* 1.5*   ALKPHOS 50*  --   --    ALT 23  --   --    AST 27  --   --        Diagnostics:

## 2024-05-03 NOTE — TELEPHONE ENCOUNTER
When I attempt to schedule appt message pops up stating insurance not in network and pt will have to pay out of pocket; attempted to call pt but no answer and unable to LM

## 2024-05-03 NOTE — ASSESSMENT & PLAN NOTE
Stressed the importance of his maintaining regular nephrology appointments. Unfortunately he has not seen a nephrologist in several years due to insurance issues.     -I have contacted nephrology at [facility] who agrees to see him in clinic.   -Renal function currently at baseline  -Continue home immunosuppressive medications.

## 2024-05-03 NOTE — PLAN OF CARE
Follow up with Karen Tang  CHW called to schedule pcp f/u, Chelo/ sent message to nurse to call patient with appt. [id number] [place] [id number] [phone]

## 2024-05-03 NOTE — ASSESSMENT & PLAN NOTE
Continue home Tacrolimus and Mycophenolate medications  --Monitor daily tacrolimus troughs.   -Adjust dose as needed.

## 2024-05-03 NOTE — SUBJECTIVE & OBJECTIVE
Past Medical History:   Diagnosis Date    Anemia of chronic renal failure 12/3/2013    Blind right eye     Cataract     CKD (chronic kidney disease) stage 2, GFR 60-89 ml/min 2/24/2016    CKD (chronic kidney disease) stage 2, GFR 60-89 ml/min 2/24/2016    ESRD (end stage renal disease) 2008    Hypertension     Hypomagnesemia 1/18/2017    Hypophosphatemia 1/18/2017    Latent TB - 4/7/11 - INH x 9 months 12/3/2013    Pulmonary hypertension - PASP 58mmHg (9/13) 12/3/2013    Pulmonary hypertension - PASP 58mmHg (9/13) improved on recent echo PA pressure of 13 12/3/2013    S/P kidney transplant 1/28/2016     Secondary hyperparathyroidism, renal        Past Surgical History:   Procedure Laterality Date    APPENDECTOMY      AV FISTULA PLACEMENT      EUSEBIO    COLONOSCOPY      CORNEAL TRANSPLANT  01/01/1997    LEFT EYE    CORNEAL TRANSPLANT  01/01/2010    RIGHT EYE    CORNEAL TRANSPLANT  01/01/2000    LEFT EYE    EYE SURGERY      KIDNEY TRANSPLANT  01/28/2016    TONSILLECTOMY         Review of patient's allergies indicates:  No Known Allergies    Medications:  Current Facility-Administered Medications   Medication Dose Route Frequency Provider Last Rate Last Admin    acetaminophen tablet 650 mg  650 mg Oral Q4H PRN Berry Tijerina MD   650 mg at 05/02/24 1701    carvediloL tablet 12.5 mg  12.5 mg Oral BID Berry Tijerina MD   12.5 mg at 05/03/24 0813    cefTRIAXone (ROCEPHIN) 2 g in dextrose 5 % in water (D5W) 100 mL IVPB (MB+)  2 g Intravenous Q24H Berry Tijerina MD   Stopped at 05/02/24 1728    dextrose 10% bolus 125 mL 125 mL  12.5 g Intravenous PRN Berry Tijerina MD        dextrose 10% bolus 250 mL 250 mL  25 g Intravenous PRN Berry Tijerina MD        enoxaparin injection 40 mg  40 mg Subcutaneous Q24H (treatment, non-standard time) Berry Tijerina MD   40 mg at 05/02/24 2213    glucagon (human recombinant) injection 1 mg  1 mg Intramuscular PRN Berry Tijerina MD        glucose chewable tablet 16 g  16 g Oral PRN Berry Tijerina MD         glucose chewable tablet 24 g  24 g Oral PRN Berry Tijerina MD        insulin aspart U-100 pen 0-5 Units  0-5 Units Subcutaneous QID (AC + HS) PRN Berry Tijerina MD        morphine injection 2 mg  2 mg Intravenous Q4H PRN Jonathan Berger, DO   2 mg at 05/02/24 0924    naloxone 0.4 mg/mL injection 0.02 mg  0.02 mg Intravenous PRN Berry Tijerina MD        ondansetron injection 4 mg  4 mg Intravenous Q8H PRN Berry Tijerina MD   4 mg at 05/02/24 0917    oxyCODONE immediate release tablet 5 mg  5 mg Oral Q6H PRN Jonathan Berger, DO   5 mg at 05/02/24 0640    pantoprazole EC tablet 40 mg  40 mg Oral Daily Alisia Medina MD   40 mg at 05/03/24 0813    polyethylene glycol packet 17 g  17 g Oral Daily Jonathan Berger, DO        potassium phosphate 15 mmol in dextrose 5 % (D5W) 250 mL infusion  15 mmol Intravenous Once Alisia Medina MD 62.5 mL/hr at 05/03/24 1305 15 mmol at 05/03/24 1305    prochlorperazine injection Soln 5 mg  5 mg Intravenous Q6H PRN Berry Tijerina MD        sodium chloride 0.9% flush 10 mL  10 mL Intravenous Q12H PRN Berry Tijerina MD        tacrolimus capsule 2 mg  2 mg Oral BID NotSita MD   2 mg at 05/03/24 0813     Antibiotics (From admission, onward)      Start     Stop Route Frequency Ordered    05/02/24 1700  cefTRIAXone (ROCEPHIN) 2 g in dextrose 5 % in water (D5W) 100 mL IVPB (MB+)  (Intra-abdominal Infection)         05/08/24 1659 IV Every 24 hours (non-standard times) 05/01/24 2115          Antifungals (From admission, onward)      None          Antivirals (From admission, onward)      None             Immunization History   Administered Date(s) Administered    COVID-19, MRNA, LN-S, PF (Pfizer) (Purple Cap) 07/26/2021, 08/16/2021    Hepatitis A / Hepatitis B 04/07/2011    Influenza - Quadrivalent - PF *Preferred* (6 months and older) 10/03/2018    Influenza A (H1N1) 2009 Monovalent - IM - PF 02/12/2010       Family History       Problem Relation (Age of Onset)    Cancer Maternal Aunt     Hypertension Mother    Kidney disease Maternal Aunt          Social History     Socioeconomic History    Marital status: Single   Tobacco Use    Smoking status: Never    Smokeless tobacco: Never   Substance and Sexual Activity    Alcohol use: Yes     Comment: beer rarely    Drug use: No    Sexual activity: Yes     Partners: Female     Social Determinants of Health     Financial Resource Strain: Low Risk  (5/3/2024)    Overall Financial Resource Strain (CARDIA)     Difficulty of Paying Living Expenses: Not very hard   Food Insecurity: Food Insecurity Present (5/3/2024)    Hunger Vital Sign     Worried About Running Out of Food in the Last Year: Sometimes true     Ran Out of Food in the Last Year: Sometimes true   Transportation Needs: No Transportation Needs (5/3/2024)    PRAPARE - Transportation     Lack of Transportation (Medical): No     Lack of Transportation (Non-Medical): No   Physical Activity: Inactive (5/2/2024)    Exercise Vital Sign     Days of Exercise per Week: 0 days     Minutes of Exercise per Session: 0 min   Stress: Stress Concern Present (5/3/2024)    [employer] [place] [employer] - Occupational Stress Questionnaire     Feeling of Stress : To some extent   Housing Stability: Low Risk  (5/3/2024)    Housing Stability Vital Sign     Unable to Pay for Housing in the Last Year: No     Homeless in the Last Year: No     Objective:     Vital Signs (Most Recent):  Temp: 98.8 °F (37.1 °C) (05/03/24 1204)  Pulse: 67 (05/03/24 1204)  Resp: 17 (05/03/24 0823)  BP: 114/73 (05/03/24 1204)  SpO2: 96 % (05/03/24 1204) Vital Signs (24h Range):  Temp:  [98.2 °F (36.8 °C)-101 °F (38.3 °C)] 98.8 °F (37.1 °C)  Pulse:  [] 67  Resp:  [16-17] 17  SpO2:  [95 %-100 %] 96 %  BP: (106-136)/(66-81) 114/73     Weight: 83.9 kg (185 lb)  Body mass index is 28.98 kg/m².    CrCl cannot be calculated (Unknown ideal weight.).     Physical Exam  Vitals and nursing note reviewed.   Constitutional:       General: He is  not in acute distress.     Appearance: Normal appearance. He is not ill-appearing or toxic-appearing.   HENT:      Head: Normocephalic and atraumatic.      Mouth/Throat:      Mouth: Mucous membranes are moist.      Comments: Mild white layer on tongue  Eyes:      General: No scleral icterus.     Conjunctiva/sclera: Conjunctivae normal.   Cardiovascular:      Rate and Rhythm: Normal rate and regular rhythm.      Heart sounds: Normal heart sounds. No murmur heard.  Pulmonary:      Effort: Pulmonary effort is normal. No respiratory distress.      Breath sounds: Normal breath sounds. No wheezing or rhonchi.   Abdominal:      General: There is no distension.      Palpations: Abdomen is soft.      Tenderness: There is no abdominal tenderness.   Musculoskeletal:      Cervical back: No rigidity.   Lymphadenopathy:      Cervical: No cervical adenopathy.   Skin:     General: Skin is warm and dry.   Neurological:      General: No focal deficit present.      Mental Status: He is alert and oriented to person, place, and time.   Psychiatric:         Mood and Affect: Mood normal.         Behavior: Behavior normal.          Significant Labs:   Microbiology Results (last 7 days)       Procedure Component Value Units Date/Time    Blood culture x two cultures. Draw prior to antibiotics. [[id number]] Collected: 05/01/24 1619    Order Status: Completed Specimen: Blood from Peripheral, Antecubital, Right Updated: 05/02/24 1812     Blood Culture, Routine No Growth to date      No Growth to date    Narrative:      Aerobic and anaerobic    Blood culture x two cultures. Draw prior to antibiotics. [[id number]] Collected: 05/01/24 1619    Order Status: Completed Specimen: Blood from Peripheral, Antecubital, Right Updated: 05/02/24 1812     Blood Culture, Routine No Growth to date      No Growth to date    Narrative:      Aerobic and anaerobic    Influenza A & B by Molecular [[id number]] Collected: 05/01/24 1643    Order Status: Completed  Specimen: Nasopharyngeal Swab Updated: 05/01/24 1738     Influenza A, Molecular Negative     Influenza B, Molecular Negative     Flu A & B Source Nasal swab            Significant Imaging: I have reviewed all pertinent imaging results/findings within the past 24 hours.

## 2024-05-03 NOTE — ASSESSMENT & PLAN NOTE
History of L kidney transplant in 2016 for HTN nephropathy  - Presents with N/V and LUQ abdominal pain; febrile to 101 shortly after arrival; leukocytosis to 13k  - CT C/A/P showed R transplant kidney with mildly striated enhancement.  - R native kidney w/ atrophy and a 3cm soft tissue density, concerning for RCC  - L native kidney w/ atrophy and small cyst  - UA generally bland (9 WBC's, LE-, N-), blood cultures negative.  Reports frequency and hesitancy but no outright burning or back pain     Patient already  reports significant improvement in symptoms on current regimen.  No pathognomic symptoms and bland UA, if infective pyelonephritis is present, then likely mild.  Would suggest completing course with PO regimen while working up mass    Recommendations::  -- OK to narrow to cefpodoxime 100mg q12hr  -- Treat for 14 day course (5/1 - 5/14)  -- Nystatin swish and spit for possible mild thrush

## 2024-05-03 NOTE — CONSULTS
[facility] - Med S[facility] ([facility]-)  Kidney Transplant  Consult Note    Inpatient consult to Kidney/Pancreas Transplant Medicine  Consult performed by: Quang Brewer MD  Consult ordered by: Alisia Medina MD  Reason for consult: history of renal transplant            Subjective:     History of Present Illness:   64 yo male psot kidney transplant in 2016 presented to hospital with N/V, LUQ abdominal pain, and fever on day of admission, meeting SIRS criteria. UA noteworthy for 2+ blood and protein, w/o nitrites but 9 WBCs/hpf.    His baseline creatinine is 1.3-1.5. He reports taking tacrolimus 2 mg bid and  mg BID.    CT a/p with IV contrast had noteworthy findings of native & transplant kidneys:  Kidneys: There is a 3 cm soft tissue density enhancing mass at the upper pole of the native right kidney suggesting renal carcinoma.  Follow-up recommended.  Few probable simple cyst in the right kidney also.  Small subcentimeter cyst in the left native kidney.  There is moderate atrophy of the native kidneys bilaterally.  No stone or hydronephrosis bilaterally.  Transplant kidney noted in the right iliac fossa.  Minimally striated enhancement pattern could represent pyelonephritis of the transplant kidney and follow-up is recommended.  No stone hydronephrosis of the transplant kidney.    Unfortunately, he has not followed with nephrology or transplant medicine in several years due to lack of insurance. He does report compliance with his immunosuppression medication. Labs on arrival show that he is currently at his baseline renal function.     Mr. Downing is a 63 y.o. year old male who is status post Kidney Transplant - 1/28/2016 - Not Followed  (#1).    his maintenance immunosuppression consists of:   Immunosuppressants (From admission, onward)      Start     Stop Route Frequency Ordered    05/02/24 1800  tacrolimus capsule 2 mg         -- Oral 2 times daily 05/02/24 4961            Interval  History:      Past Medical and Surgical History: Mr. Downing has a past medical history of Anemia of chronic renal failure (12/3/2013), Blind right eye, Cataract, CKD (chronic kidney disease) stage 2, GFR 60-89 ml/min (2/24/2016), CKD (chronic kidney disease) stage 2, GFR 60-89 ml/min (2/24/2016), ESRD (end stage renal disease) (2008), Hypertension, Hypomagnesemia (1/18/2017), Hypophosphatemia (1/18/2017), Latent TB - 4/7/11 - INH x 9 months (12/3/2013), Pulmonary hypertension - PASP 58mmHg (9/13) (12/3/2013), Pulmonary hypertension - PASP 58mmHg (9/13) improved on recent echo PA pressure of 13 (12/3/2013), S/P kidney transplant 1/28/2016, and Secondary hyperparathyroidism, renal.  He has a past surgical history that includes Corneal transplant (01/01/1997); Corneal transplant (01/01/2010); Corneal transplant (01/01/2000); AV fistula placement; Tonsillectomy; Colonoscopy; Eye surgery; Appendectomy; and Kidney transplant (01/28/2016).    Past Social and Family History: Mr. Downing reports that he has never smoked. He has never used smokeless tobacco. He reports current alcohol use. He reports that he does not use drugs.His family history includes Cancer in his maternal aunt; Hypertension in his mother; Kidney disease in his maternal aunt.    Intake/Output - Last 3 Shifts         05/01 0700  05/02 0659 05/02 0700  05/03 0659 05/03 0700  05/04 0659    P.O.  540     IV Piggyback 1097.7      Total Intake(mL/kg) 1097.7 (13.1) 540 (6.4)     Urine (mL/kg/hr) 200 1100 (0.5)     Emesis/NG output 0      Stool 0      Total Output 200 1100     Net +897.7 -560            Urine Occurrence  1 x     Stool Occurrence  0 x     Emesis Occurrence  0 x              Review of Systems   Constitutional:  Negative for chills, fatigue and fever.   Respiratory:  Negative for cough, shortness of breath and wheezing.    Cardiovascular:  Negative for chest pain, palpitations and leg swelling.   Gastrointestinal:  Positive for abdominal  distention, nausea and vomiting. Negative for constipation and diarrhea.   Endocrine: Negative for polyuria.   Genitourinary:  Negative for dysuria.   Neurological:  Negative for tremors, weakness and numbness.     Objective:     Vital Signs (Most Recent):  Temp: 98.8 °F (37.1 °C) (05/03/24 1204)  Pulse: 67 (05/03/24 1204)  Resp: 17 (05/03/24 0823)  BP: 114/73 (05/03/24 1204)  SpO2: 96 % (05/03/24 1204) Vital Signs (24h Range):  Temp:  [98.2 °F (36.8 °C)-101 °F (38.3 °C)] 98.8 °F (37.1 °C)  Pulse:  [66-83] 67  Resp:  [16-17] 17  SpO2:  [95 %-100 %] 96 %  BP: (106-136)/(66-81) 114/73     Weight: 83.9 kg (185 lb)     Body mass index is 28.98 kg/m².     Physical Exam  Vitals and nursing note reviewed.   Constitutional:       Appearance: Normal appearance.   HENT:      Head: Normocephalic and atraumatic.      Right Ear: External ear normal.      Left Ear: External ear normal.      Nose: Nose normal.      Mouth/Throat:      Mouth: Mucous membranes are moist.   Eyes:      Extraocular Movements: Extraocular movements intact.   Cardiovascular:      Rate and Rhythm: Normal rate and regular rhythm.      Pulses: Normal pulses.      Heart sounds: Normal heart sounds.   Pulmonary:      Effort: Pulmonary effort is normal.      Breath sounds: Normal breath sounds.   Abdominal:      General: Abdomen is flat.      Palpations: Abdomen is soft.   Musculoskeletal:         General: Normal range of motion.      Cervical back: Normal range of motion and neck supple.      Right lower leg: No edema.      Left lower leg: No edema.   Skin:     General: Skin is warm and dry.      Capillary Refill: Capillary refill takes less than 2 seconds.   Neurological:      General: No focal deficit present.      Mental Status: He is alert and oriented to person, place, and time.   Psychiatric:         Mood and Affect: Mood normal.         Behavior: Behavior normal.         Thought Content: Thought content normal.         Judgment: Judgment normal.           Significant Labs:  CBC:   Recent Labs   Lab 05/01/24  1541 05/02/24  0645 05/03/24  0602   WBC 13.51* 10.59 7.61   RBC 4.69 4.62 3.74*   HGB 13.6* 13.5* 11.1*   HCT 40.1 41.1 32.4*   * 123* 144*   MCV 86 89 87   MCH 29.0 29.2 29.7   MCHC 33.9 32.8 34.3     CMP:   Recent Labs   Lab 05/01/24  1541 05/02/24  0645 05/03/24  0602   GLU 90 81 87   CALCIUM 10.8* 10.2 9.3   ALBUMIN 3.6  --   --    PROT 8.7*  --   --     137 136   K 3.6 3.9 3.3*   CO2 25 22* 25    104 104   BUN 18 21 19   CREATININE 1.5* 1.6* 1.5*   ALKPHOS 50*  --   --    ALT 23  --   --    AST 27  --   --        Diagnostics:    Assessment/Plan:     Cardiac/Vascular  Hypertension  Continue his home medication      Renal/  Renal mass, right  Per urology and oncology teams.       S/P kidney transplant 1/28/2016  Stressed the importance of his maintaining regular nephrology appointments. Unfortunately he has not seen a nephrologist in several years due to insurance issues.     -I have contacted nephrology at [facility] who agrees to see him in clinic.   -Renal function currently at baseline  -Continue home immunosuppressive medications.       ID  Possible Pyelonephritis  Per primary team        Endocrine  Secondary hyperparathyroidism, renal  PTH, Intact   Date Value Ref Range Status   05/02/2024 81.9 (H) 9.0 - 77.0 pg/mL Final           Palliative Care  Long-term use of immunosuppressant medication  Continue home Tacrolimus and Mycophenolate medications  --Monitor daily tacrolimus troughs.   -Adjust dose as needed.           Discharge Planning:  Per primary team        Quang Brewer MD  Kidney Transplant  [facility] - Med Surg ([facility]-16)

## 2024-05-03 NOTE — PLAN OF CARE
CM spoke with pt and rajani in room.  Instructed in d/c process, pt v/u.  Pt d/c dispo home.  He will get a ride from [facility]. Denies DME needs.    CM informed pt that the cost of his abx is $118.76.  He states he cannot pay this.  CM informed that we would try to get help from CM dept to help him with this.  CM asked pharmacy for cost transfer; CM asked supervisor Naomi Kothari if she would approve cost transfer.  She says yes.    Per Kandace, cost of medicines $131.26 d/t another med added. Cost transfer form emailed to Kandace Gauthier, pharmacy.    THAI FosterN, BS, RN, CCM

## 2024-05-03 NOTE — PROGRESS NOTES
Hospital Medicine  Progress note    Team: [facility] Alisia Medina MD  Admit Date: 5/1/2024  Code status: Full Code    Principal Problem:  LUQ abdominal pain    Interval hx:  No new complaints. Feeling improved.     PEx  Temp:  [98.7 °F (37.1 °C)-101.4 °F (38.6 °C)]   Pulse:  []   Resp:  [17-21]   BP: (116-144)/(69-95)   SpO2:  [95 %-99 %]     Intake/Output Summary (Last 24 hours) at 5/2/2024 1943  Last data filed at 5/2/2024 1825  Gross per 24 hour   Intake 540 ml   Output 1300 ml   Net -760 ml       General Appearance: no acute distress, WDWN  Heart: regular rate and rhythm, no heave  Respiratory: Normal respiratory effort, symmetric excursion, bilateral vesicular breath sounds   Abdomen: Soft, non-tender; bowel sounds active  Skin: intact, no rash, no ulcers  Neurologic:  No focal numbness or weakness  Mental status: Alert, oriented x 4, affect appropriate    Recent Labs   Lab 05/01/24  1541 05/02/24  0645   WBC 13.51* 10.59   HGB 13.6* 13.5*   HCT 40.1 41.1   * 123*     Recent Labs   Lab 05/01/24  1541 05/02/24  0645    137   K 3.6 3.9    104   CO2 25 22*   BUN 18 21   CREATININE 1.5* 1.6*   GLU 90 81   CALCIUM 10.8* 10.2   MG  --  1.9   PHOS  --  2.2*   LIPASE 29  --      Recent Labs   Lab 05/01/24  1541   ALKPHOS 50*   ALT 23   AST 27   ALBUMIN 3.6   PROT 8.7*   BILITOT 0.7        Recent Labs   Lab 05/02/24  1617   POCTGLUCOSE 97       Scheduled Meds:  Current Facility-Administered Medications   Medication Dose Route Frequency    carvediloL  12.5 mg Oral BID    cefTRIAXone (Rocephin) IV (PEDS and ADULTS)  2 g Intravenous Q24H    enoxparin  40 mg Subcutaneous Q24H (treatment, non-standard time)    metroNIDAZOLE IV (PEDS and ADULTS)  500 mg Intravenous Q8H    mycophenolate  500 mg Oral BID    pantoprazole  40 mg Oral Daily    polyethylene glycol  17 g Oral Daily    tacrolimus  2 mg Oral BID     Continuous Infusions:  Current Facility-Administered Medications   Medication Dose Route  Frequency Last Rate Last Admin     As Needed:    Current Facility-Administered Medications:     acetaminophen, 650 mg, Oral, Q4H PRN    dextrose 10%, 12.5 g, Intravenous, PRN    dextrose 10%, 25 g, Intravenous, PRN    glucagon (human recombinant), 1 mg, Intramuscular, PRN    glucose, 16 g, Oral, PRN    glucose, 24 g, Oral, PRN    insulin aspart U-100, 0-5 Units, Subcutaneous, QID (AC + HS) PRN    morphine, 2 mg, Intravenous, Q4H PRN    naloxone, 0.02 mg, Intravenous, PRN    ondansetron, 4 mg, Intravenous, Q8H PRN    oxyCODONE, 5 mg, Oral, Q6H PRN    prochlorperazine, 5 mg, Intravenous, Q6H PRN    sodium chloride 0.9%, 10 mL, Intravenous, Q12H PRN    Assessment and Plan  / Problems managed today    * SIRS (systemic inflammatory response syndrome)  Pyelonephritis of transplant kidney  - Temp 100.9, , RR 21, SaO2 >95% RA, -140s/80-90s. WBC 13.51, Hb 13.6, Platelets 137, Creat 1.5, BNP 32, Trop I 0.026, Lactate 1.5, Procal 0.52, UA Neg, Lipase WNL. Influenza negative.   - Blood cultures pending.  - CT abdomen and pelvis showed pyelonephritis of transplant kidney  -ID consult    LUQ abdominal pain  64 yo M with h/o HTN, CKD s/p transplant (2016), Anemia of chronic renal failure, Secondary hyperparathyroidism, renal; Long-term use of immunosuppressant medication presented to ED for nausea vomiting and abdominal pain.      - He is meeting SIRS criteria  - Abdomen CT pending.  - In the ED, received ceftriaxone, 1L NS iv bolus  - start ceftriaxone 2 g IV daily, metronidazole  Q 8 hours  - we will monitor change antibiotics according to CT scan abdomen and clinical status  - keep NPO for now due to nausea vomiting, slowly upgrade the diet once nausea vomiting resolved   - PRN IV antiemetics (EKG 5/1/24  ms)      Renal mass, right  Oncology consult appreciated  Urology consult    S/P kidney transplant 1/28/2016  - since starting of antibiotics hold cellcept  - consult kidney transplant medicine  -  avoid nephrotoxic drugs  - resume prograf    Long-term use of immunosuppressant medication       Anemia of chronic renal failure  Patient's anemia is currently controlled. Has not received any PRBCs to date. Etiology likely d/t chronic disease due to Chronic Kidney Disease  Current CBC reviewed-   Lab Results   Component Value Date    HGB 13.6 (L) 05/01/2024    HCT 40.1 05/01/2024     Monitor serial CBC and transfuse if patient becomes hemodynamically unstable, symptomatic or H/H drops below 7/21.    Secondary hyperparathyroidism, renal  Elevated at 81    Hypertension  Chronic, controlled. Latest blood pressure and vitals reviewed-     Temp:  [98.7 °F (37.1 °C)-101.4 °F (38.6 °C)]   Pulse:  []   Resp:  [16-21]   BP: (106-144)/(66-95)   SpO2:  [95 %-99 %] .   Home meds for hypertension were reviewed and noted below.   Hypertension Medications               carvediloL (COREG) 12.5 MG tablet Take 1 tablet (12.5 mg total) by mouth 2 (two) times daily.     While in the hospital, will manage blood pressure as follows; Continue home antihypertensive regimen    Will utilize p.r.n. blood pressure medication only if patient's blood pressure greater than 180/110 and he develops symptoms such as worsening chest pain or shortness of breath.      Diet:  full liquid  GI PPx: protonix  DVT PPx:  enoxaparin  Airways: room air  Wounds: none    Goals of Care:  Return to prior functional status     Discharge Planning   KENA: 5/4/2024   Is the patient medically ready for discharge?:     Reason for patient still in hospital (select all that apply): Patient trending condition and Treatment  Discharge Plan A: Home        Alisia Medina MD

## 2024-05-03 NOTE — HPI
"64yo M with a PMH significant for kidney transplant in 2016 secondary to hypertensive nephropathy and now CKD; latent TB s/p 9mo ING (2013), pulmonary hypertension, cataracts, previous HSV keratitis, and previous BK viremia who presents with LUQ abdominal pain, nausea, and vomiting.  The patient recalls eating a pork chop that was in his freezer for "10 years," and feels like his symptoms occurred in the following day.  He became febrile, tachycardic, and poor PO intake, which led to his presentation to [facility].  On arrival, he was found to be febrile to 101 and tachycardic above 100.  Leukocytosis to 13k noted on arrival with a mild/possible ANTHONY w/ Cr of 1.5 (baseline 1.2 - 1.4).  CXR showed no acute cardiopulmonary processes.  CT C/A/P was then completed, and showed the transplant kidney with a mildly striated enhancement - suggestive of pyelonephritis.  When inquired about urinary symptoms; the patient reports issues with frequency and some hesitency, but denies outright dysuria.  The patient was started on ceftriaxone on arrival.  UA obtained was generally bland, with 9 WBCs/LE-/Nitrate-.  Also of note - the patient's CT found a 3cm enhancing mass in the upper pole of his native, right kidney; concerning for renal cell carcinoma.  Oncology was consulted, who suggested urology evaluation, which is pending.  Given his lack of pathognomic urinary symptoms and normal UA, but with abnormal CT and some associated urinary symptoms, ID was consulted to assist with antibiotic management.      "

## 2024-05-03 NOTE — TELEPHONE ENCOUNTER
----- Message from Zahraa Terry sent at 5/3/2024  2:13 PM CDT -----  Name of Who is Calling:LILIA VELAZCO [[id number]]                    What is the request in detail:Pt is requesting a call back to be scheduled within 7 days. Pt was discharged from hospital and need follow up visit. Please assist.                     Can the clinic reply by [facility]: No                    What Number to [facility] Back [facility]: [phone]

## 2024-05-03 NOTE — ASSESSMENT & PLAN NOTE
Chronic, controlled. Latest blood pressure and vitals reviewed-     Temp:  [98.7 °F (37.1 °C)-101.4 °F (38.6 °C)]   Pulse:  []   Resp:  [16-21]   BP: (106-144)/(66-95)   SpO2:  [95 %-99 %] .   Home meds for hypertension were reviewed and noted below.   Hypertension Medications               carvediloL (COREG) 12.5 MG tablet Take 1 tablet (12.5 mg total) by mouth 2 (two) times daily.     While in the hospital, will manage blood pressure as follows; Continue home antihypertensive regimen    Will utilize p.r.n. blood pressure medication only if patient's blood pressure greater than 180/110 and he develops symptoms such as worsening chest pain or shortness of breath.

## 2024-05-03 NOTE — PLAN OF CARE
Problem: Adult Inpatient Plan of Care  Goal: Plan of Care Review  5/3/2024 1702 by Taylor Shafer RN  Outcome: Met  5/3/2024 1702 by Taylor Shafer RN  Outcome: Progressing  5/3/2024 0718 by Taylor Shafer RN  Outcome: Progressing  Goal: Patient-Specific Goal (Individualized)  5/3/2024 1702 by Taylor Shafer RN  Outcome: Met  5/3/2024 1702 by Taylor Shafer RN  Outcome: Progressing  5/3/2024 0718 by Taylor Shafer RN  Outcome: Progressing  Goal: Absence of Hospital-Acquired Illness or Injury  5/3/2024 1702 by Taylor Shafer RN  Outcome: Met  5/3/2024 1702 by Taylor Shafer RN  Outcome: Progressing  5/3/2024 0718 by Taylor Shafer RN  Outcome: Progressing  Goal: Optimal Comfort and Wellbeing  5/3/2024 1702 by Taylor Shafer RN  Outcome: Met  5/3/2024 1702 by Taylor Shafer RN  Outcome: Progressing  5/3/2024 0718 by Taylor Shafer RN  Outcome: Progressing  Goal: Readiness for Transition of Care  5/3/2024 1702 by Taylor Shafer RN  Outcome: Met  5/3/2024 1702 by Taylor Shafer RN  Outcome: Progressing  5/3/2024 0718 by Taylor Shafer RN  Outcome: Progressing

## 2024-05-04 NOTE — DISCHARGE SUMMARY
Discharge Summary  Hospital Medicine    Attending Provider on Discharge: Alisia Medina MD  [facility] Medicine Team: [facility] HOSP MED B  Date of Admission:  5/1/2024     Date of Discharge:  5/3/2024  Code status: Full Code    Active Hospital Problems    Diagnosis  POA    *SIRS (systemic inflammatory response syndrome) [R65.10]  Yes     Priority: 1 - High    LUQ abdominal pain [R10.12]  Yes     Priority: 1 - High    Renal mass, right [N28.89]  Yes     Priority: 2     Long-term use of immunosuppressant medication [Z79.60]  Not Applicable     Priority: 3     S/P kidney transplant 1/28/2016 [Z94.0]  Not Applicable     Priority: 3     Possible Pyelonephritis [N12]  Yes    Anemia of chronic renal failure [N18.9, D63.1]  Yes     Chronic    Hypertension [I10]  Yes    Secondary hyperparathyroidism, renal [N25.81]  Yes      Resolved Hospital Problems   No resolved problems to display.     HPI  62 yo M with h/o HTN, CKD s/p transplant (2016), Anemia of chronic renal failure, Secondary hyperparathyroidism, renal; Long-term use of immunosuppressant medication presented to ED for nausea vomiting and abdominal pain.  He reports nausea and vomiting started 3 days ago he has not been able to eat anything for last 3 days because of vomiting anything he eats.  He also reports of abdominal pain left upper quadrant, 10 x 10 intensity, dull, intermittent, nonradiating, no any aggravating or alleviating factor.  He reports he started having a fever today only.  He has not been following with primary care physician or nephrologist because of no insurance.  He mentioned he passes urine inadequate amount, frequency everyday.  He is compliant to his carvedilol, CellCept, Prograf medicines regularly     In the ED, received ceftriaxone, 1L NS iv bolus. Temp 100.9, , RR 21, SaO2 >95% RA, -140s/80-90s. WBC 13.51, Hb 13.6, Platelets 137, Creat 1.5, BNP 32, Trop I 0.026, Lactate 1.5, Procal 0.52, UA Neg, Lipase WNL. Influenza negative. Blood  cultures pending.    EKG 5/1/24 sinus rhythm with Premature supraventricular complexes and with occasional Premature ventricular complexes ST and T wave abnormality, consider lateral ischemia    Chest XR 5/1/24 unremarkable.     Abdomen CT pending.     Hospital Course  * SIRS (systemic inflammatory response syndrome)  Pyelonephritis of transplant kidney  - Temp 100.9, , RR 21, SaO2 >95% RA, -140s/80-90s. WBC 13.51, Hb 13.6, Platelets 137, Creat 1.5, BNP 32, Trop I 0.026, Lactate 1.5, Procal 0.52, UA Neg, Lipase WNL. Influenza negative.   - Blood cultures pending.  - CT abdomen and pelvis showed pyelonephritis of transplant kidney  -ID consult. Recommended 14 days of antibiotic treatment. Can discharge on cefopoxime 200 mg BID    LUQ abdominal pain  CT abdomen and pelvis showed pyelonephritis of transplanted kidney. No GI abnormality noted    Nausea and vomiting  Started on clear liquid diet. Given anti-emetics. Felt much improved after admission and able to advance diet to regular.    Hypophosphorus and hypokalemia  Likely refection of loss due to nausea and vomiting. Replaced prior to discharge.    Renal mass, right  Oncology consult appreciated  Urology consult as outpatient. KTM made verbal referral to provider at [facility] and we faxed a referral to their clinic.     S/P kidney transplant 1/28/2016  - since starting of antibiotics hold cellcept  - consult kidney transplant medicine  - avoid nephrotoxic drugs  - resume prograf  - patient to hold cellcept until antibiotic course completed    Long-term use of immunosuppressant medication       Anemia of chronic renal failure  Patient's anemia is currently controlled. Has not received any PRBCs to date. Etiology likely d/t chronic disease due to Chronic Kidney Disease  Current CBC reviewed-   Lab Results   Component Value Date    HGB 13.6 (L) 05/01/2024    HCT 40.1 05/01/2024     Monitor serial CBC and transfuse if patient becomes hemodynamically unstable,  symptomatic or H/H drops below 7/21.    Secondary hyperparathyroidism, renal  Elevated at 81    Hypertension  Chronic, controlled. Latest blood pressure and vitals reviewed-     Temp:  [98.7 °F (37.1 °C)-101.4 °F (38.6 °C)]   Pulse:  []   Resp:  [16-21]   BP: (106-144)/(66-95)   SpO2:  [95 %-99 %] .   Home meds for hypertension were reviewed and noted below.   Hypertension Medications               carvediloL (COREG) 12.5 MG tablet Take 1 tablet (12.5 mg total) by mouth 2 (two) times daily.     While in the hospital, will manage blood pressure as follows; Continue home antihypertensive regimen    Will utilize p.r.n. blood pressure medication only if patient's blood pressure greater than 180/110 and he develops symptoms such as worsening chest pain or shortness of breath.      Procedures: none    Consultants: oncology and KTM    Discharge Medication List as of 5/3/2024  2:44 PM        START taking these medications    Details   pantoprazole (PROTONIX) 40 MG tablet Take 1 tablet (40 mg total) by mouth once daily., Starting Fri 5/3/2024, Until Sat 5/3/2025, Normal           CONTINUE these medications which have CHANGED    Details   carvediloL (COREG) 12.5 MG tablet Take 1 tablet (12.5 mg total) by mouth 2 (two) times daily., Starting Fri 5/3/2024, Until Thu 8/1/2024, Normal      cefpodoxime (VANTIN) 200 MG tablet Take 1 tablet (200 mg total) by mouth 2 (two) times a day. for 11 days, Starting Fri 5/3/2024, Until Tue 5/14/2024, Normal           CONTINUE these medications which have NOT CHANGED    Details   tacrolimus (PROGRAF) 1 MG Cap Take 2 capsules (2 mg total) by mouth every 12 (twelve) hours., Starting Tue 9/5/2023, Until Thu 9/5/2024, Normal      mycophenolate (CELLCEPT) 250 mg Cap Take 2 capsules (500 mg total) by mouth 2 (two) times daily., Starting Tue 9/5/2023, Until Thu 9/5/2024, Normal           STOP taking these medications       multivitamin (THERAGRAN) tablet Comments:   Reason for Stopping:                Discharge Diet:regular diet     Activity: activity as tolerated    Discharge Condition: Good    Disposition: Home or Self Care    Tests pending at the time of discharge: none      Time spent  on the discharge of the patient including review of hospital course with the patient. reviewing discharge medications and arranging follow-up care 35 min    Discharge examination Patient was seen and examined on the date of discharge and determined to be suitable for discharge.    Discharge plan   PCP  [facility] urology    Alisia Medina MD

## 2024-05-06 LAB
BACTERIA BLD CULT: NORMAL
BACTERIA BLD CULT: NORMAL

## 2024-05-06 NOTE — PLAN OF CARE
[facility] - Med Surg ([facility])  Discharge Final Note    Primary Care Provider: Karen Tang MD    Expected Discharge Date: 5/3/2024    Final Discharge Note (most recent)       Final Note - 05/06/24 0848          Final Note    Assessment Type Final Discharge Note (P)      Anticipated Discharge Disposition Home or Self Care (P)         Post-Acute Status    Coverage [facility] (P)                      Important Message from Medicare             Contact Info       Karen Tang MD   Specialty: Family Medicine, Internal Medicine   Relationship: PCP - General    [address] [place] [address]   Phone: [phone]       Next Steps: Follow up    Instructions: CHW called to schedule pcp f/uChelo/ sent message to nurse to call patient with appt.    [facility] - Nephrology/Colon & Rectal/Urology   Specialty: Nephrology, Colon and Rectal Surgery, Urology    [address] [place] [id number]   Phone: [phone]       Next Steps: Schedule an appointment as soon as possible for a visit    Instructions: for right kidney mass. Call to make appointment as soon as possible        Pt d/c'd to home.    THAI FosterN, BS, RN, CCM

## 2024-05-13 ENCOUNTER — TELEPHONE (OUTPATIENT)
Dept: TRANSPLANT | Facility: HOSPITAL | Age: 64
End: 2024-05-13
Payer: MEDICAID

## 2024-05-13 NOTE — TELEPHONE ENCOUNTER
"FYI -     Transplant Coordinator messaged Dr. Hughes and myself regarding pt insurance issue. I included Transplant  Eve who replied to all with the following FYI -    ----- Message -----   From: Eve Walls   Sent: 5/13/2024   7:57 AM CDT   To: Amrik Hughes MD; *   Subject: RE: no insurance                                 "Good Morning,   - I have sent message to [facility] team asking if they can assist patient with medicaid application.   - Currently patient has a [facility] plan and Medicare Part A only.   - Pharmacy coverage [employer] and through his [facility] plan. [facility] plan #: [id number].   - Please try to run medications through this provider and advise on if it was successful.   - If patient does not qualify for Medicaid, he can attempt at Medicare Part B if he has other disabilities now or 2 months prior to his 64th birthday which is 11/2025.   - As it relates to new episode. If we open one, we would have to see if this particular plan has Transplant benefits. [facility] plan has a high Deductible and Out of Pocket maximum. Medicaid would help if he qualifies."  "

## 2024-08-05 PROBLEM — N12 PYELONEPHRITIS: Status: RESOLVED | Noted: 2024-05-03 | Resolved: 2024-08-05

## 2024-08-08 LAB
ALBUMIN SERPL BCP-MCNC: 4.4 G/DL (ref 3.5–5.2)
ALLENS TEST: ABNORMAL
ALP SERPL-CCNC: 45 U/L (ref 55–135)
ALT SERPL W/O P-5'-P-CCNC: 9 U/L (ref 10–44)
ANION GAP SERPL CALC-SCNC: 11 MMOL/L (ref 8–16)
ANION GAP SERPL CALC-SCNC: 13 MMOL/L (ref 8–16)
AST SERPL-CCNC: 19 U/L (ref 10–40)
BASOPHILS # BLD AUTO: 0.05 K/UL (ref 0–0.2)
BASOPHILS NFR BLD: 0.4 % (ref 0–1.9)
BILIRUB SERPL-MCNC: 1.1 MG/DL (ref 0.1–1)
BUN SERPL-MCNC: 16 MG/DL (ref 8–23)
BUN SERPL-MCNC: 19 MG/DL (ref 6–30)
CALCIUM SERPL-MCNC: 11 MG/DL (ref 8.7–10.5)
CHLORIDE SERPL-SCNC: 103 MMOL/L (ref 95–110)
CHLORIDE SERPL-SCNC: 105 MMOL/L (ref 95–110)
CO2 SERPL-SCNC: 23 MMOL/L (ref 23–29)
CREAT SERPL-MCNC: 1.6 MG/DL (ref 0.5–1.4)
CREAT SERPL-MCNC: 1.6 MG/DL (ref 0.5–1.4)
DELSYS: ABNORMAL
DIFFERENTIAL METHOD BLD: ABNORMAL
EOSINOPHIL # BLD AUTO: 0.1 K/UL (ref 0–0.5)
EOSINOPHIL NFR BLD: 0.5 % (ref 0–8)
ERYTHROCYTE [DISTWIDTH] IN BLOOD BY AUTOMATED COUNT: 15 % (ref 11.5–14.5)
EST. GFR  (NO RACE VARIABLE): 48 ML/MIN/1.73 M^2
GLUCOSE SERPL-MCNC: 97 MG/DL (ref 70–110)
GLUCOSE SERPL-MCNC: 98 MG/DL (ref 70–110)
HCT VFR BLD AUTO: 46 % (ref 40–54)
HCT VFR BLD CALC: 48 %PCV (ref 36–54)
HGB BLD-MCNC: 15.8 G/DL (ref 14–18)
IMM GRANULOCYTES # BLD AUTO: 0.04 K/UL (ref 0–0.04)
IMM GRANULOCYTES NFR BLD AUTO: 0.3 % (ref 0–0.5)
LIPASE SERPL-CCNC: 32 U/L (ref 4–60)
LYMPHOCYTES # BLD AUTO: 2.1 K/UL (ref 1–4.8)
LYMPHOCYTES NFR BLD: 17.7 % (ref 18–48)
MCH RBC QN AUTO: 29.4 PG (ref 27–31)
MCHC RBC AUTO-ENTMCNC: 34.3 G/DL (ref 32–36)
MCV RBC AUTO: 86 FL (ref 82–98)
MONOCYTES # BLD AUTO: 0.8 K/UL (ref 0.3–1)
MONOCYTES NFR BLD: 7 % (ref 4–15)
NEUTROPHILS # BLD AUTO: 8.9 K/UL (ref 1.8–7.7)
NEUTROPHILS NFR BLD: 74.1 % (ref 38–73)
NRBC BLD-RTO: 0 /100 WBC
PLATELET # BLD AUTO: 224 K/UL (ref 150–450)
PMV BLD AUTO: 11.1 FL (ref 9.2–12.9)
POC IONIZED CALCIUM: 1.29 MMOL/L (ref 1.06–1.42)
POC TCO2 (MEASURED): 26 MMOL/L (ref 23–29)
POTASSIUM BLD-SCNC: 4.3 MMOL/L (ref 3.5–5.1)
POTASSIUM SERPL-SCNC: 3.7 MMOL/L (ref 3.5–5.1)
PROT SERPL-MCNC: 8.7 G/DL (ref 6–8.4)
RBC # BLD AUTO: 5.38 M/UL (ref 4.6–6.2)
SAMPLE: ABNORMAL
SITE: ABNORMAL
SODIUM BLD-SCNC: 136 MMOL/L (ref 136–145)
SODIUM SERPL-SCNC: 139 MMOL/L (ref 136–145)
WBC # BLD AUTO: 11.99 K/UL (ref 3.9–12.7)

## 2024-08-08 PROCEDURE — 85025 COMPLETE CBC W/AUTO DIFF WBC: CPT | Performed by: STUDENT IN AN ORGANIZED HEALTH CARE EDUCATION/TRAINING PROGRAM

## 2024-08-08 PROCEDURE — 93010 ELECTROCARDIOGRAM REPORT: CPT | Mod: ,,, | Performed by: INTERNAL MEDICINE

## 2024-08-08 PROCEDURE — 80053 COMPREHEN METABOLIC PANEL: CPT | Performed by: STUDENT IN AN ORGANIZED HEALTH CARE EDUCATION/TRAINING PROGRAM

## 2024-08-08 PROCEDURE — 85014 HEMATOCRIT: CPT

## 2024-08-08 PROCEDURE — 84132 ASSAY OF SERUM POTASSIUM: CPT

## 2024-08-08 PROCEDURE — 99285 EMERGENCY DEPT VISIT HI MDM: CPT | Mod: 25

## 2024-08-08 PROCEDURE — 93005 ELECTROCARDIOGRAM TRACING: CPT

## 2024-08-08 PROCEDURE — 99900035 HC TECH TIME PER 15 MIN (STAT)

## 2024-08-08 PROCEDURE — 83690 ASSAY OF LIPASE: CPT | Performed by: STUDENT IN AN ORGANIZED HEALTH CARE EDUCATION/TRAINING PROGRAM

## 2024-08-08 PROCEDURE — 84295 ASSAY OF SERUM SODIUM: CPT

## 2024-08-08 PROCEDURE — 82330 ASSAY OF CALCIUM: CPT

## 2024-08-08 PROCEDURE — 82565 ASSAY OF CREATININE: CPT

## 2024-08-09 ENCOUNTER — HOSPITAL ENCOUNTER (INPATIENT)
Facility: HOSPITAL | Age: 64
LOS: 1 days | Discharge: HOME | DRG: 948 | End: 2024-08-09
Attending: EMERGENCY MEDICINE | Admitting: STUDENT IN AN ORGANIZED HEALTH CARE EDUCATION/TRAINING PROGRAM
Payer: MEDICARE

## 2024-08-09 VITALS
OXYGEN SATURATION: 97 % | TEMPERATURE: 98 F | DIASTOLIC BLOOD PRESSURE: 75 MMHG | HEART RATE: 79 BPM | SYSTOLIC BLOOD PRESSURE: 113 MMHG | RESPIRATION RATE: 18 BRPM

## 2024-08-09 DIAGNOSIS — R00.0 TACHYCARDIA: ICD-10-CM

## 2024-08-09 DIAGNOSIS — R18.8 OTHER ASCITES: ICD-10-CM

## 2024-08-09 DIAGNOSIS — R07.9 CHEST PAIN: ICD-10-CM

## 2024-08-09 DIAGNOSIS — R10.9 ABDOMINAL PAIN, UNSPECIFIED ABDOMINAL LOCATION: ICD-10-CM

## 2024-08-09 DIAGNOSIS — R10.32 BILATERAL LOWER ABDOMINAL PAIN: Primary | ICD-10-CM

## 2024-08-09 DIAGNOSIS — R10.31 BILATERAL LOWER ABDOMINAL PAIN: Primary | ICD-10-CM

## 2024-08-09 PROBLEM — R65.10 SIRS (SYSTEMIC INFLAMMATORY RESPONSE SYNDROME): Status: RESOLVED | Noted: 2024-05-01 | Resolved: 2024-08-09

## 2024-08-09 LAB
ABO + RH BLD: NORMAL
ALBUMIN SERPL BCP-MCNC: 3.8 G/DL (ref 3.5–5.2)
ALP SERPL-CCNC: 35 U/L (ref 55–135)
ALT SERPL W/O P-5'-P-CCNC: 9 U/L (ref 10–44)
ANION GAP SERPL CALC-SCNC: 10 MMOL/L (ref 8–16)
AST SERPL-CCNC: 21 U/L (ref 10–40)
BASOPHILS # BLD AUTO: 0.04 K/UL (ref 0–0.2)
BASOPHILS NFR BLD: 0.4 % (ref 0–1.9)
BILIRUB SERPL-MCNC: 1 MG/DL (ref 0.1–1)
BILIRUB UR QL STRIP: NEGATIVE
BLD GP AB SCN CELLS X3 SERPL QL: NORMAL
BUN SERPL-MCNC: 16 MG/DL (ref 8–23)
CALCIUM SERPL-MCNC: 10.4 MG/DL (ref 8.7–10.5)
CHLORIDE SERPL-SCNC: 105 MMOL/L (ref 95–110)
CLARITY UR: CLEAR
CO2 SERPL-SCNC: 24 MMOL/L (ref 23–29)
COLOR UR: YELLOW
CREAT SERPL-MCNC: 1.4 MG/DL (ref 0.5–1.4)
DIFFERENTIAL METHOD BLD: ABNORMAL
EOSINOPHIL # BLD AUTO: 0 K/UL (ref 0–0.5)
EOSINOPHIL NFR BLD: 0.3 % (ref 0–8)
ERYTHROCYTE [DISTWIDTH] IN BLOOD BY AUTOMATED COUNT: 15 % (ref 11.5–14.5)
EST. GFR  (NO RACE VARIABLE): 56 ML/MIN/1.73 M^2
GLUCOSE SERPL-MCNC: 99 MG/DL (ref 70–110)
GLUCOSE UR QL STRIP: NEGATIVE
HCT VFR BLD AUTO: 41 % (ref 40–54)
HGB BLD-MCNC: 14.2 G/DL (ref 14–18)
HGB UR QL STRIP: ABNORMAL
IMM GRANULOCYTES # BLD AUTO: 0.03 K/UL (ref 0–0.04)
IMM GRANULOCYTES NFR BLD AUTO: 0.3 % (ref 0–0.5)
INR PPP: 1.1 (ref 0.8–1.2)
KETONES UR QL STRIP: ABNORMAL
LEUKOCYTE ESTERASE UR QL STRIP: NEGATIVE
LYMPHOCYTES # BLD AUTO: 1.4 K/UL (ref 1–4.8)
LYMPHOCYTES NFR BLD: 13.8 % (ref 18–48)
MAGNESIUM SERPL-MCNC: 1.6 MG/DL (ref 1.6–2.6)
MCH RBC QN AUTO: 30 PG (ref 27–31)
MCHC RBC AUTO-ENTMCNC: 34.6 G/DL (ref 32–36)
MCV RBC AUTO: 87 FL (ref 82–98)
MICROSCOPIC COMMENT: ABNORMAL
MONOCYTES # BLD AUTO: 0.8 K/UL (ref 0.3–1)
MONOCYTES NFR BLD: 8 % (ref 4–15)
NEUTROPHILS # BLD AUTO: 8 K/UL (ref 1.8–7.7)
NEUTROPHILS NFR BLD: 77.2 % (ref 38–73)
NITRITE UR QL STRIP: NEGATIVE
NRBC BLD-RTO: 0 /100 WBC
OHS QRS DURATION: 74 MS
OHS QTC CALCULATION: 460 MS
PH UR STRIP: 6 [PH] (ref 5–8)
PHOSPHATE SERPL-MCNC: 2.9 MG/DL (ref 2.7–4.5)
PLATELET # BLD AUTO: 184 K/UL (ref 150–450)
PMV BLD AUTO: 11 FL (ref 9.2–12.9)
POTASSIUM SERPL-SCNC: 3.6 MMOL/L (ref 3.5–5.1)
PROT SERPL-MCNC: 7.5 G/DL (ref 6–8.4)
PROT UR QL STRIP: NEGATIVE
PROTHROMBIN TIME: 11.6 SEC (ref 9–12.5)
RBC # BLD AUTO: 4.74 M/UL (ref 4.6–6.2)
RBC #/AREA URNS HPF: 6 /HPF (ref 0–4)
SODIUM SERPL-SCNC: 139 MMOL/L (ref 136–145)
SP GR UR STRIP: 1.02 (ref 1–1.03)
SPECIMEN OUTDATE: NORMAL
SQUAMOUS #/AREA URNS HPF: 1 /HPF
URN SPEC COLLECT METH UR: ABNORMAL
UROBILINOGEN UR STRIP-ACNC: NEGATIVE EU/DL
WBC # BLD AUTO: 10.3 K/UL (ref 3.9–12.7)

## 2024-08-09 PROCEDURE — 80053 COMPREHEN METABOLIC PANEL: CPT | Performed by: STUDENT IN AN ORGANIZED HEALTH CARE EDUCATION/TRAINING PROGRAM

## 2024-08-09 PROCEDURE — 96361 HYDRATE IV INFUSION ADD-ON: CPT

## 2024-08-09 PROCEDURE — 25000003 PHARM REV CODE 250: Performed by: NURSE PRACTITIONER

## 2024-08-09 PROCEDURE — 81000 URINALYSIS NONAUTO W/SCOPE: CPT | Performed by: STUDENT IN AN ORGANIZED HEALTH CARE EDUCATION/TRAINING PROGRAM

## 2024-08-09 PROCEDURE — 86900 BLOOD TYPING SEROLOGIC ABO: CPT | Performed by: STUDENT IN AN ORGANIZED HEALTH CARE EDUCATION/TRAINING PROGRAM

## 2024-08-09 PROCEDURE — 11000001 HC ACUTE MED/SURG PRIVATE ROOM

## 2024-08-09 PROCEDURE — 85610 PROTHROMBIN TIME: CPT | Performed by: STUDENT IN AN ORGANIZED HEALTH CARE EDUCATION/TRAINING PROGRAM

## 2024-08-09 PROCEDURE — 25000003 PHARM REV CODE 250: Performed by: EMERGENCY MEDICINE

## 2024-08-09 PROCEDURE — 25000003 PHARM REV CODE 250: Performed by: STUDENT IN AN ORGANIZED HEALTH CARE EDUCATION/TRAINING PROGRAM

## 2024-08-09 PROCEDURE — 83735 ASSAY OF MAGNESIUM: CPT | Performed by: STUDENT IN AN ORGANIZED HEALTH CARE EDUCATION/TRAINING PROGRAM

## 2024-08-09 PROCEDURE — 80197 ASSAY OF TACROLIMUS: CPT | Performed by: STUDENT IN AN ORGANIZED HEALTH CARE EDUCATION/TRAINING PROGRAM

## 2024-08-09 PROCEDURE — 25500020 PHARM REV CODE 255: Performed by: EMERGENCY MEDICINE

## 2024-08-09 PROCEDURE — 63600175 PHARM REV CODE 636 W HCPCS: Performed by: STUDENT IN AN ORGANIZED HEALTH CARE EDUCATION/TRAINING PROGRAM

## 2024-08-09 PROCEDURE — 63600175 PHARM REV CODE 636 W HCPCS: Performed by: EMERGENCY MEDICINE

## 2024-08-09 PROCEDURE — 96374 THER/PROPH/DIAG INJ IV PUSH: CPT

## 2024-08-09 PROCEDURE — 84100 ASSAY OF PHOSPHORUS: CPT | Performed by: STUDENT IN AN ORGANIZED HEALTH CARE EDUCATION/TRAINING PROGRAM

## 2024-08-09 PROCEDURE — 86850 RBC ANTIBODY SCREEN: CPT | Performed by: STUDENT IN AN ORGANIZED HEALTH CARE EDUCATION/TRAINING PROGRAM

## 2024-08-09 PROCEDURE — 86901 BLOOD TYPING SEROLOGIC RH(D): CPT | Performed by: STUDENT IN AN ORGANIZED HEALTH CARE EDUCATION/TRAINING PROGRAM

## 2024-08-09 PROCEDURE — 99223 1ST HOSP IP/OBS HIGH 75: CPT | Mod: ,,, | Performed by: NURSE PRACTITIONER

## 2024-08-09 PROCEDURE — 85025 COMPLETE CBC W/AUTO DIFF WBC: CPT | Performed by: STUDENT IN AN ORGANIZED HEALTH CARE EDUCATION/TRAINING PROGRAM

## 2024-08-09 PROCEDURE — 96372 THER/PROPH/DIAG INJ SC/IM: CPT | Performed by: EMERGENCY MEDICINE

## 2024-08-09 RX ORDER — SODIUM CHLORIDE 0.9 % (FLUSH) 0.9 %
10 SYRINGE (ML) INJECTION EVERY 12 HOURS PRN
Status: DISCONTINUED | OUTPATIENT
Start: 2024-08-09 | End: 2024-08-09 | Stop reason: HOSPADM

## 2024-08-09 RX ORDER — ACETAMINOPHEN 325 MG/1
650 TABLET ORAL EVERY 4 HOURS PRN
Status: DISCONTINUED | OUTPATIENT
Start: 2024-08-09 | End: 2024-08-09 | Stop reason: HOSPADM

## 2024-08-09 RX ORDER — BISACODYL 10 MG/1
10 SUPPOSITORY RECTAL DAILY PRN
Status: DISCONTINUED | OUTPATIENT
Start: 2024-08-09 | End: 2024-08-09 | Stop reason: HOSPADM

## 2024-08-09 RX ORDER — LIDOCAINE HYDROCHLORIDE 10 MG/ML
INJECTION, SOLUTION INFILTRATION; PERINEURAL
Status: COMPLETED | OUTPATIENT
Start: 2024-08-09 | End: 2024-08-09

## 2024-08-09 RX ORDER — CARVEDILOL 12.5 MG/1
12.5 TABLET ORAL 2 TIMES DAILY
Status: DISCONTINUED | OUTPATIENT
Start: 2024-08-09 | End: 2024-08-09 | Stop reason: HOSPADM

## 2024-08-09 RX ORDER — TACROLIMUS 1 MG/1
2 CAPSULE ORAL 2 TIMES DAILY
Status: DISCONTINUED | OUTPATIENT
Start: 2024-08-09 | End: 2024-08-09 | Stop reason: HOSPADM

## 2024-08-09 RX ORDER — ONDANSETRON HYDROCHLORIDE 2 MG/ML
4 INJECTION, SOLUTION INTRAVENOUS EVERY 8 HOURS PRN
Status: DISCONTINUED | OUTPATIENT
Start: 2024-08-09 | End: 2024-08-09 | Stop reason: HOSPADM

## 2024-08-09 RX ORDER — ACETAMINOPHEN 325 MG/1
650 TABLET ORAL EVERY 8 HOURS PRN
Status: DISCONTINUED | OUTPATIENT
Start: 2024-08-09 | End: 2024-08-09 | Stop reason: HOSPADM

## 2024-08-09 RX ORDER — SODIUM,POTASSIUM PHOSPHATES 280-250MG
2 POWDER IN PACKET (EA) ORAL
Status: DISCONTINUED | OUTPATIENT
Start: 2024-08-09 | End: 2024-08-09 | Stop reason: HOSPADM

## 2024-08-09 RX ORDER — IBUPROFEN 200 MG
16 TABLET ORAL
Status: DISCONTINUED | OUTPATIENT
Start: 2024-08-09 | End: 2024-08-09 | Stop reason: HOSPADM

## 2024-08-09 RX ORDER — PROCHLORPERAZINE EDISYLATE 5 MG/ML
5 INJECTION INTRAMUSCULAR; INTRAVENOUS EVERY 6 HOURS PRN
Status: DISCONTINUED | OUTPATIENT
Start: 2024-08-09 | End: 2024-08-09 | Stop reason: HOSPADM

## 2024-08-09 RX ORDER — ALUMINUM HYDROXIDE, MAGNESIUM HYDROXIDE, AND SIMETHICONE 1200; 120; 1200 MG/30ML; MG/30ML; MG/30ML
30 SUSPENSION ORAL 4 TIMES DAILY PRN
Status: DISCONTINUED | OUTPATIENT
Start: 2024-08-09 | End: 2024-08-09 | Stop reason: HOSPADM

## 2024-08-09 RX ORDER — POLYETHYLENE GLYCOL 3350 17 G/17G
17 POWDER, FOR SOLUTION ORAL DAILY
Status: DISCONTINUED | OUTPATIENT
Start: 2024-08-09 | End: 2024-08-09 | Stop reason: HOSPADM

## 2024-08-09 RX ORDER — ONDANSETRON 8 MG/1
8 TABLET, ORALLY DISINTEGRATING ORAL EVERY 12 HOURS PRN
Qty: 20 TABLET | Refills: 0 | Status: SHIPPED | OUTPATIENT
Start: 2024-08-09

## 2024-08-09 RX ORDER — FAMOTIDINE 10 MG/ML
20 INJECTION INTRAVENOUS
Status: COMPLETED | OUTPATIENT
Start: 2024-08-09 | End: 2024-08-09

## 2024-08-09 RX ORDER — TALC
6 POWDER (GRAM) TOPICAL NIGHTLY PRN
Status: DISCONTINUED | OUTPATIENT
Start: 2024-08-09 | End: 2024-08-09 | Stop reason: HOSPADM

## 2024-08-09 RX ORDER — DICYCLOMINE HYDROCHLORIDE 10 MG/ML
20 INJECTION INTRAMUSCULAR
Status: COMPLETED | OUTPATIENT
Start: 2024-08-09 | End: 2024-08-09

## 2024-08-09 RX ORDER — LANOLIN ALCOHOL/MO/W.PET/CERES
800 CREAM (GRAM) TOPICAL
Status: DISCONTINUED | OUTPATIENT
Start: 2024-08-09 | End: 2024-08-09 | Stop reason: HOSPADM

## 2024-08-09 RX ORDER — NALOXONE HCL 0.4 MG/ML
0.02 VIAL (ML) INJECTION
Status: DISCONTINUED | OUTPATIENT
Start: 2024-08-09 | End: 2024-08-09 | Stop reason: HOSPADM

## 2024-08-09 RX ORDER — IPRATROPIUM BROMIDE AND ALBUTEROL SULFATE 2.5; .5 MG/3ML; MG/3ML
3 SOLUTION RESPIRATORY (INHALATION) EVERY 4 HOURS PRN
Status: DISCONTINUED | OUTPATIENT
Start: 2024-08-09 | End: 2024-08-09 | Stop reason: HOSPADM

## 2024-08-09 RX ORDER — SIMETHICONE 80 MG
1 TABLET,CHEWABLE ORAL 4 TIMES DAILY PRN
Status: DISCONTINUED | OUTPATIENT
Start: 2024-08-09 | End: 2024-08-09 | Stop reason: HOSPADM

## 2024-08-09 RX ORDER — PANTOPRAZOLE SODIUM 40 MG/1
40 TABLET, DELAYED RELEASE ORAL DAILY
Qty: 30 TABLET | Refills: 11 | Status: SHIPPED | OUTPATIENT
Start: 2024-08-09 | End: 2025-08-09

## 2024-08-09 RX ORDER — PANTOPRAZOLE SODIUM 40 MG/1
40 TABLET, DELAYED RELEASE ORAL DAILY
Status: DISCONTINUED | OUTPATIENT
Start: 2024-08-09 | End: 2024-08-09 | Stop reason: HOSPADM

## 2024-08-09 RX ORDER — ALUMINUM HYDROXIDE, MAGNESIUM HYDROXIDE, AND SIMETHICONE 1200; 120; 1200 MG/30ML; MG/30ML; MG/30ML
30 SUSPENSION ORAL
Status: COMPLETED | OUTPATIENT
Start: 2024-08-09 | End: 2024-08-09

## 2024-08-09 RX ORDER — MYCOPHENOLATE MOFETIL 250 MG/1
500 CAPSULE ORAL 2 TIMES DAILY
Status: DISCONTINUED | OUTPATIENT
Start: 2024-08-09 | End: 2024-08-09 | Stop reason: HOSPADM

## 2024-08-09 RX ORDER — IBUPROFEN 200 MG
24 TABLET ORAL
Status: DISCONTINUED | OUTPATIENT
Start: 2024-08-09 | End: 2024-08-09 | Stop reason: HOSPADM

## 2024-08-09 RX ORDER — OXYCODONE AND ACETAMINOPHEN 5; 325 MG/1; MG/1
1 TABLET ORAL EVERY 8 HOURS PRN
Qty: 10 EACH | Refills: 0 | Status: SHIPPED | OUTPATIENT
Start: 2024-08-09

## 2024-08-09 RX ORDER — GLUCAGON 1 MG
1 KIT INJECTION
Status: DISCONTINUED | OUTPATIENT
Start: 2024-08-09 | End: 2024-08-09 | Stop reason: HOSPADM

## 2024-08-09 RX ADMIN — DICYCLOMINE HYDROCHLORIDE 20 MG: 10 INJECTION, SOLUTION INTRAMUSCULAR at 02:08

## 2024-08-09 RX ADMIN — IOHEXOL 80 ML: 350 INJECTION, SOLUTION INTRAVENOUS at 02:08

## 2024-08-09 RX ADMIN — FAMOTIDINE 20 MG: 10 INJECTION, SOLUTION INTRAVENOUS at 02:08

## 2024-08-09 RX ADMIN — ALUMINUM HYDROXIDE, MAGNESIUM HYDROXIDE, AND SIMETHICONE 30 ML: 200; 200; 20 SUSPENSION ORAL at 02:08

## 2024-08-09 RX ADMIN — MYCOPHENOLATE MOFETIL 500 MG: 250 CAPSULE ORAL at 10:08

## 2024-08-09 RX ADMIN — SODIUM CHLORIDE 1000 ML: 9 INJECTION, SOLUTION INTRAVENOUS at 01:08

## 2024-08-09 RX ADMIN — ACETAMINOPHEN 650 MG: 325 TABLET ORAL at 10:08

## 2024-08-09 RX ADMIN — LIDOCAINE HYDROCHLORIDE 10 ML: 10 INJECTION, SOLUTION INFILTRATION; PERINEURAL at 08:08

## 2024-08-09 RX ADMIN — CARVEDILOL 12.5 MG: 12.5 TABLET, FILM COATED ORAL at 09:08

## 2024-08-09 RX ADMIN — TACROLIMUS 2 MG: 1 CAPSULE ORAL at 09:08

## 2024-08-09 RX ADMIN — PANTOPRAZOLE SODIUM 40 MG: 40 TABLET, DELAYED RELEASE ORAL at 05:08

## 2024-08-09 NOTE — NURSING
Pt arrived on unit, awake alert and oriented. IV site noted, saline lock. Pt on room air, no distress. Vitals assessed. 7/10 abdominal pain. Pt ambulated to bathroom, remained free from fall/injury. Safety measures maintained. Will cont to monitor

## 2024-08-09 NOTE — ASSESSMENT & PLAN NOTE
Chronic, stable. Latest blood pressure and vitals reviewed-     Temp:  [98.1 °F (36.7 °C)-98.9 °F (37.2 °C)]   Pulse:  []   Resp:  [16-20]   BP: (113-163)/(75-96)   SpO2:  [95 %-98 %] .   Home meds for hypertension were reviewed and noted below.   Hypertension Medications               carvediloL (COREG) 12.5 MG tablet Take 1 tablet (12.5 mg total) by mouth 2 (two) times daily.            While in the hospital, will manage blood pressure as follows; Continue home antihypertensive regimen    Will utilize p.r.n. blood pressure medication only if patient's blood pressure greater than 180/110 and he develops symptoms such as worsening chest pain or shortness of breath.

## 2024-08-09 NOTE — PLAN OF CARE
Case Management Assessment     PCP: Karen Tang MD    Pharmacy:   [id number] @ [address] AT [facility]  [address] [place] [address] [id number]  [facility] [address]  Phone: [phone] Fax: [phone]    Patient Arrived From: Home   Existing Help at Home: Nolvia Tapia (Significant other) [phone]     Barriers to Discharge: None    Discharge Plan:    A. Home   B. Home with family    Spoke with patient at bedside.  He reported he lives alone and is independent, but his significant other is available to help at home as needed.  Patient reported not seeing his PCP in a while and possibly needing a new one.  Patient stated he is in agreement with seeing his current PCP for his hospital follow up however; patient also open to receiving information for [facility]. CM to add clinic information to patient AVS. CM to continue to assess for and until discharge.    08/09/24 1345   Discharge Assessment   Assessment Type Discharge Planning Assessment   Confirmed/corrected address, phone number and insurance Yes   Confirmed Demographics Correct on Facesheet   Source of Information patient   Does patient/caregiver understand observation status Yes   Communicated KENA with patient/caregiver Yes   People in Home alone   Facility Arrived From: Home   Do you expect to return to your current living situation? Yes   Do you have help at home or someone to help you manage your care at home? Yes   Who are your caregiver(s) and their phone number(s)? Nolvia Tapia (Significant other)  [phone]   Prior to hospitilization cognitive status: Unable to Assess   Current cognitive status: Alert/Oriented   Walking or Climbing Stairs Difficulty no   Dressing/Bathing Difficulty no   Equipment Currently Used at Home none   Readmission within 30 days? No   Patient currently being followed by outpatient case management? No   Do you currently have  service(s) that help you manage your care at home? No   Do you take prescription medications? Yes   Do you have prescription coverage? Yes   Coverage MEDICARE - MEDICARE A ONLY   Do you have any problems affording any of your prescribed medications? No   Is the patient taking medications as prescribed? yes   Who is going to help you get home at discharge? Nolvia Tapia (Significant other)  [phone]   How do you get to doctors appointments? car, drives self   Are you on dialysis? No   Do you take coumadin? No   Discharge Plan A Home   Discharge Plan B Home with family   DME Needed Upon Discharge  none   Discharge Plan discussed with: Patient   Transition of Care Barriers None

## 2024-08-09 NOTE — SUBJECTIVE & OBJECTIVE
Past Medical History:   Diagnosis Date    Anemia of chronic renal failure 12/3/2013    Blind right eye     Cataract     CKD (chronic kidney disease) stage 2, GFR 60-89 ml/min 2/24/2016    CKD (chronic kidney disease) stage 2, GFR 60-89 ml/min 2/24/2016    ESRD (end stage renal disease) 2008    Hypertension     Hypomagnesemia 1/18/2017    Hypophosphatemia 1/18/2017    Latent TB - 4/7/11 - INH x 9 months 12/3/2013    Pulmonary hypertension - PASP 58mmHg (9/13) 12/3/2013    Pulmonary hypertension - PASP 58mmHg (9/13) improved on recent echo PA pressure of 13 12/3/2013    S/P kidney transplant 1/28/2016     Secondary hyperparathyroidism, renal        Past Surgical History:   Procedure Laterality Date    APPENDECTOMY      AV FISTULA PLACEMENT      EUSEBIO    COLONOSCOPY      CORNEAL TRANSPLANT  01/01/1997    LEFT EYE    CORNEAL TRANSPLANT  01/01/2010    RIGHT EYE    CORNEAL TRANSPLANT  01/01/2000    LEFT EYE    EYE SURGERY      KIDNEY TRANSPLANT  01/28/2016    TONSILLECTOMY         Review of patient's allergies indicates:  No Known Allergies    No current facility-administered medications on file prior to encounter.     Current Outpatient Medications on File Prior to Encounter   Medication Sig    carvediloL (COREG) 12.5 MG tablet Take 1 tablet (12.5 mg total) by mouth 2 (two) times daily.    mycophenolate (CELLCEPT) 250 mg Cap Take 2 capsules (500 mg total) by mouth 2 (two) times daily.    pantoprazole (PROTONIX) 40 MG tablet Take 1 tablet (40 mg total) by mouth once daily.    tacrolimus (PROGRAF) 1 MG Cap Take 2 capsules (2 mg total) by mouth every 12 (twelve) hours.     Family History       Problem Relation (Age of Onset)    Cancer Maternal Aunt    Hypertension Mother    Kidney disease Maternal Aunt          Tobacco Use    Smoking status: Never    Smokeless tobacco: Never   Substance and Sexual Activity    Alcohol use: Yes     Comment: beer rarely    Drug use: No    Sexual activity: Yes     Partners: Female     Review of  Systems   Constitutional:  Positive for appetite change and fatigue.   HENT:  Negative for ear discharge and ear pain.    Eyes:  Negative for discharge and itching.   Respiratory:  Negative for cough and shortness of breath.    Cardiovascular:  Negative for chest pain and palpitations.   Gastrointestinal:  Positive for abdominal pain and nausea.   Endocrine: Negative for cold intolerance and heat intolerance.   Genitourinary:  Negative for difficulty urinating and dysuria.   Musculoskeletal:  Negative for neck pain and neck stiffness.   Skin:  Negative for rash and wound.   Neurological:  Negative for seizures and syncope.   Psychiatric/Behavioral:  Negative for agitation and hallucinations.      Objective:     Vital Signs (Most Recent):  Temp: 98.1 °F (36.7 °C) (08/09/24 1212)  Pulse: 79 (08/09/24 1212)  Resp: 18 (08/09/24 1212)  BP: 113/75 (08/09/24 1212)  SpO2: 97 % (08/09/24 1212) Vital Signs (24h Range):  Temp:  [98.1 °F (36.7 °C)-98.9 °F (37.2 °C)] 98.1 °F (36.7 °C)  Pulse:  [] 79  Resp:  [16-20] 18  SpO2:  [95 %-98 %] 97 %  BP: (113-163)/(75-96) 113/75        There is no height or weight on file to calculate BMI.     Physical Exam  Constitutional:       Appearance: He is not toxic-appearing or diaphoretic.   HENT:      Head: Normocephalic and atraumatic.      Mouth/Throat:      Pharynx: Oropharynx is clear. No oropharyngeal exudate or posterior oropharyngeal erythema.   Cardiovascular:      Rate and Rhythm: Normal rate and regular rhythm.   Pulmonary:      Effort: No respiratory distress.      Breath sounds: Normal breath sounds.   Abdominal:      Palpations: Abdomen is soft.      Tenderness: There is abdominal tenderness. There is no guarding or rebound.   Musculoskeletal:         General: No deformity or signs of injury.   Skin:     General: Skin is warm and dry.   Neurological:      Mental Status: He is oriented to person, place, and time.      Cranial Nerves: No cranial nerve deficit.                 Significant Labs: All pertinent labs within the past 24 hours have been reviewed.  BMP:   Recent Labs   Lab 08/09/24  0550   GLU 99      K 3.6      CO2 24   BUN 16   CREATININE 1.4   CALCIUM 10.4   MG 1.6     CBC:   Recent Labs   Lab 08/08/24 2058 08/08/24 2108 08/09/24  0550   WBC 11.99  --  10.30   HGB 15.8  --  14.2   HCT 46.0 48 41.0     --  184       Significant Imaging: I have reviewed all pertinent imaging results/findings within the past 24 hours.

## 2024-08-09 NOTE — Clinical Note
Right: Abdomen.   Scrubbed with Chlorhexidine/Alcohol.    Hair: N/A.  Skin prep dry before draping.  Prepped by: Pattie Houston NP 8/9/2024 8:22 AM.

## 2024-08-09 NOTE — ED PROVIDER NOTES
"Encounter Date: 8/8/2024    SCRIBE #1 NOTE: I, Kajal Veena, am scribing for, and in the presence of,  Be Cedillo MD.       History     Chief Complaint   Patient presents with    Abdominal Pain     Pt w/ hx of kidney tx in 2016 presents w/ c/o of severe lower abd pain right under umbilicus. Pain is 10/10. Pt states that around 2 months ago he was experiencing same pain and had imaging studies which revealed "a mass around my kidneys, but they said it wasn't affecting my kidneys." Pt denies urinary symptoms. Pt c/o n/v and decreased appetite.     Kidney Transplant     Hx of transplant 2016     64 yo M w/ pHTN, CKD, s/p L kidney transplant 1/28/16 currently on cellcept and prograf, presenting to the ED for acute on chronic bl abdominal pain. States his pain started a couple of months ago. He also reports 2 episodes of vomiting since last PM, nonbloody, nonbilousl. He also reports decreased appetite since yesterday, and 1 watery BM, and urinary frequency. He reports he was diagnosed with a R kidney mass a couple of months ago that was concerning for malignancy. He has not been able to f/u with a GI specialist or any other provider since as he currently does not have insurance. He has been able to take his cellcept and prograf as he still has leftover prescriptions. He has an appointment this week to obtain health insurance. No other exacerbating or alleviating factors. Denies fever, chills, BRBPR, melena, or other associated symptoms. He denies any excessive NSAID use. Patient denies EtOH, tobacco, or illicit drug use.     The history is provided by the patient.     Review of patient's allergies indicates:  No Known Allergies  Past Medical History:   Diagnosis Date    Anemia of chronic renal failure 12/3/2013    Blind right eye     Cataract     CKD (chronic kidney disease) stage 2, GFR 60-89 ml/min 2/24/2016    CKD (chronic kidney disease) stage 2, GFR 60-89 ml/min 2/24/2016    ESRD (end stage renal disease) 2008    " Hypertension     Hypomagnesemia 1/18/2017    Hypophosphatemia 1/18/2017    Latent TB - 4/7/11 - INH x 9 months 12/3/2013    Pulmonary hypertension - PASP 58mmHg (9/13) 12/3/2013    Pulmonary hypertension - PASP 58mmHg (9/13) improved on recent echo PA pressure of 13 12/3/2013    S/P kidney transplant 1/28/2016     Secondary hyperparathyroidism, renal      Past Surgical History:   Procedure Laterality Date    APPENDECTOMY      AV FISTULA PLACEMENT      EUSEBIO    COLONOSCOPY      CORNEAL TRANSPLANT  01/01/1997    LEFT EYE    CORNEAL TRANSPLANT  01/01/2010    RIGHT EYE    CORNEAL TRANSPLANT  01/01/2000    LEFT EYE    EYE SURGERY      KIDNEY TRANSPLANT  01/28/2016    TONSILLECTOMY       Family History   Problem Relation Name Age of Onset    Hypertension Mother      Cancer Maternal Aunt      Kidney disease Maternal Aunt      Amblyopia Neg Hx      Blindness Neg Hx      Cataracts Neg Hx      Diabetes Neg Hx      Glaucoma Neg Hx      Macular degeneration Neg Hx      Retinal detachment Neg Hx      Strabismus Neg Hx      Stroke Neg Hx      Thyroid disease Neg Hx       Social History     Tobacco Use    Smoking status: Never    Smokeless tobacco: Never   Substance Use Topics    Alcohol use: Yes     Comment: beer rarely    Drug use: No     Review of Systems   Constitutional:  Positive for appetite change. Negative for chills and fever.   Cardiovascular:  Negative for chest pain.   Gastrointestinal:  Positive for abdominal pain, diarrhea, nausea and vomiting. Negative for anal bleeding and blood in stool.   All other systems reviewed and are negative.      Physical Exam     Initial Vitals [08/08/24 2030]   BP Pulse Resp Temp SpO2   (!) 137/93 (!) 127 20 98.9 °F (37.2 °C) 97 %      MAP       --         Physical Exam    Nursing note and vitals reviewed.  Constitutional: He appears well-developed and well-nourished. He is not diaphoretic. No distress.   HENT:   Head: Normocephalic and atraumatic.   Eyes: EOM are normal.   Neck:    Normal range of motion.  Pulmonary/Chest: No respiratory distress.   Abdominal: Abdomen is soft. Bowel sounds are normal. There is generalized abdominal tenderness.   Musculoskeletal:         General: Normal range of motion.      Cervical back: Normal range of motion.     Neurological: He is alert and oriented to person, place, and time.   Skin: Skin is dry.         ED Course   Procedures  Labs Reviewed   CBC W/ AUTO DIFFERENTIAL - Abnormal       Result Value    WBC 11.99      RBC 5.38      Hemoglobin 15.8      Hematocrit 46.0      MCV 86      MCH 29.4      MCHC 34.3      RDW 15.0 (*)     Platelets 224      MPV 11.1      Immature Granulocytes 0.3      Gran # (ANC) 8.9 (*)     Immature Grans (Abs) 0.04      Lymph # 2.1      Mono # 0.8      Eos # 0.1      Baso # 0.05      nRBC 0      Gran % 74.1 (*)     Lymph % 17.7 (*)     Mono % 7.0      Eosinophil % 0.5      Basophil % 0.4      Differential Method Automated     COMPREHENSIVE METABOLIC PANEL - Abnormal    Sodium 139      Potassium 3.7      Chloride 103      CO2 23      Glucose 97      BUN 16      Creatinine 1.6 (*)     Calcium 11.0 (*)     Total Protein 8.7 (*)     Albumin 4.4      Total Bilirubin 1.1 (*)     Alkaline Phosphatase 45 (*)     AST 19      ALT 9 (*)     eGFR 48 (*)     Anion Gap 13     URINALYSIS, REFLEX TO URINE CULTURE - Abnormal    Specimen UA Urine, Clean Catch      Color, UA Yellow      Appearance, UA Clear      pH, UA 6.0      Specific Gravity, UA 1.020      Protein, UA Negative      Glucose, UA Negative      Ketones, UA 1+ (*)     Bilirubin (UA) Negative      Occult Blood UA 1+ (*)     Nitrite, UA Negative      Urobilinogen, UA Negative      Leukocytes, UA Negative      Narrative:     Specimen Source->Urine   URINALYSIS MICROSCOPIC - Abnormal    RBC, UA 6 (*)     Squam Epithel, UA 1      Microscopic Comment SEE COMMENT      Narrative:     Specimen Source->Urine   ISTAT PROCEDURE - Abnormal    POC Glucose 98      POC BUN 19      POC Creatinine  1.6 (*)     POC Sodium 136      POC Potassium 4.3      POC Chloride 105      POC TCO2 (MEASURED) 26      POC Anion Gap 11      POC Ionized Calcium 1.29      POC Hematocrit 48      Sample VENOUS      Site Other      Allens Test N/A      DelSys Room Air     LIPASE    Lipase 32     CYTOLOGY SPECIMEN- MEDICAL CYTOLOGY (FLUID/WASH/BRUSH)        ECG Results              EKG 12-lead (Final result)  Result time 08/09/24 11:19:26      Final result by Unknown User (08/09/24 11:19:26)                                         EKG 12-lead (Final result)        Collection Time Result Time QRS Duration [facility] QTC Calculation    08/08/24 20:41:03 08/09/24 17:32:00 74 460                     Final result by Interface, Lab In [facility] (08/09/24 17:32:06)                   Narrative:    Test Reason : R00.0,    Vent. Rate : 129 BPM     Atrial Rate : 129 BPM     P-R Int : 158 ms          QRS Dur : 074 ms      QT Int : 314 ms       P-R-T Axes : 073 006 102 degrees     QTc Int : 460 ms    Sinus tachycardia  ST and T wave abnormality, consider lateral ischemia  Abnormal ECG  When compared with ECG of 01-MAY-2024 14:43,  Significant changes have occurred  Confirmed by Michael Mcallister MD ([id number]) on 8/9/2024 5:31:58 PM    Referred By: [email or web address]   SELF           Confirmed By:Michael Mcallister MD                                  Imaging Results              CT Abdomen Pelvis With IV Contrast NO Oral Contrast (Final result)  Result time 08/09/24 03:23:13      Final result by Bro Khalil MD (08/09/24 03:23:13)                   Impression:      3 mm micronodule right lung base.  For a solid nodule <6 mm, Fleischner Society 2017 guidelines recommend no routine follow up for a low risk patient, or follow-up with non-contrast chest CT at 12 months in a high risk patient.  New    3 cm solid mass upper pole right kidney, possible renal cell carcinoma.  Suggest urology consultation.  Similar in appearance compared to recent prior.    Moderate  ascites.    Mild diffuse wall thickening several bowel loops lower abdomen/pelvis.  Possible enteritis.  No bowel obstruction.      Electronically signed by: Bro Khalil MD  Date:    08/09/2024  Time:    03:23               Narrative:    EXAMINATION:  CT ABDOMEN PELVIS WITH IV CONTRAST    CLINICAL HISTORY:  Bowel obstruction suspected;Nausea/vomiting;    TECHNIQUE:  Low dose axial images, sagittal and coronal reformations were obtained from the lung bases to the pubic symphysis following the IV administration of 80 mL of Omnipaque 350 .  Oral contrast was not administered.    COMPARISON:  05/01/2024.    FINDINGS:  Abdomen:    - Lower thorax:Small hiatal hernia.    - Lung bases: 3 mm micronodule right middle lobe.    - Liver: No focal mass.    - Gallbladder: No calcified gallstones.    - Bile Ducts: No evidence of intra or extra hepatic biliary ductal dilation.    - Spleen: Normal in size.  Mildly heterogeneous enhancement.    - Kidneys: Bilateral chronic atrophy of the native kidneys.  No hydronephrosis.  3 cm solid mass upper pole right kidney, similar in size and appearance compared to prior study.  Right lower quadrant renal transplant.    - Adrenals: Unremarkable.    - Pancreas: No mass or peripancreatic fat stranding.    - Retroperitoneum:  No significant adenopathy.    - Vascular: No abdominal aortic aneurysm.    - Abdominal wall:  Unremarkable.    Pelvis:    Moderate ascites about the liver, spleen, right pericolic gutter and in the pelvis.    Bowel/Mesentery:    No bowel obstruction.  Mild diffuse wall thickening several bowel loops in the lower abdomen/pelvis.    Bones:  No significant change from prior.                                       Medications   sodium chloride 0.9% bolus 1,000 mL 1,000 mL (0 mLs Intravenous Stopped 8/9/24 9274)   dicyclomine injection 20 mg (20 mg Intramuscular Given 8/9/24 0244)   aluminum-magnesium hydroxide-simethicone 200-200-20 mg/5 mL suspension 30 mL (30 mLs Oral Given  8/9/24 0241)   famotidine (PF) injection 20 mg (20 mg Intravenous Given 8/9/24 0242)   iohexoL (OMNIPAQUE 350) injection 80 mL (80 mLs Intravenous Given 8/9/24 0222)   LIDOcaine HCL 10 mg/ml (1%) injection (10 mLs Other Given 8/9/24 0826)     Medical Decision Making  Amount and/or Complexity of Data Reviewed  Radiology: ordered.    Risk  OTC drugs.  Prescription drug management.  Decision regarding hospitalization.               ED Course as of 08/15/24 0803   Fri Aug 09, 2024   0113 05/01/24 15:41  Creatinine: 1.5 (H)    05/02/24 06:45  Creatinine: 1.6 (H)    05/03/24 06:02  Creatinine: 1.5 (H)    08/08/24 20:58  Creatinine: 1.6 (H)      (H): Data is abnormally high [DL]      ED Course User Index  [DL] Be Cedillo MD               Medical Decision Making:   Differential Diagnosis:   Diverticulitis, ureterolithiasis, pyelonephritis, acute uncomplicated cystitis, peritonitis, bowel obstruction, colitis, fecal impaction, GIB, incarcerated/strangulated inguinal hernia, malignancy, musculoskeletal pain, others   Clinical Tests:   Lab Tests: Ordered and Reviewed  Radiological Study: Ordered and Reviewed  ED Management:  Test tesults, imaging results and in hospital management plan discussed with patient with understanding and agreement.   Labs Reviewed  Pertinent lab and imaging findings include:   There is no leukocytosis, H/H ULN, Cr 1.6, UA 1+ hematuria, 1+ ketonuria, Ca 11, Tp 8.7, Tb 1.1, GFR 48, CTAP with moderate ascites, findings cw enteritis, incidental renal mass,   Admission on 08/09/2024, Discharged on 08/09/2024   Component Date Value Ref Range Status    WBC 08/08/2024 11.99  3.90 - 12.70 K/uL Final    RBC 08/08/2024 5.38  4.60 - 6.20 M/uL Final    Hemoglobin 08/08/2024 15.8  14.0 - 18.0 g/dL Final    Hematocrit 08/08/2024 46.0  40.0 - 54.0 % Final    MCV 08/08/2024 86  82 - 98 fL Final    MCH 08/08/2024 29.4  27.0 - 31.0 pg Final    MCHC 08/08/2024 34.3  32.0 - 36.0 g/dL Final    RDW 08/08/2024 15.0 (H)   11.5 - 14.5 % Final    Platelets 08/08/2024 224  150 - 450 K/uL Final    MPV 08/08/2024 11.1  9.2 - 12.9 fL Final    Immature Granulocytes 08/08/2024 0.3  0.0 - 0.5 % Final    Gran # (ANC) 08/08/2024 8.9 (H)  1.8 - 7.7 K/uL Final    Immature Grans (Abs) 08/08/2024 0.04  0.00 - 0.04 K/uL Final    Comment: Mild elevation in immature granulocytes is non specific and   can be seen in a variety of conditions including stress response,   acute inflammation, trauma and pregnancy. Correlation with other   laboratory and clinical findings is essential.      Lymph # 08/08/2024 2.1  1.0 - 4.8 K/uL Final    Mono # 08/08/2024 0.8  0.3 - 1.0 K/uL Final    Eos # 08/08/2024 0.1  0.0 - 0.5 K/uL Final    Baso # 08/08/2024 0.05  0.00 - 0.20 K/uL Final    nRBC 08/08/2024 0  0 /100 WBC Final    Gran % 08/08/2024 74.1 (H)  38.0 - 73.0 % Final    Lymph % 08/08/2024 17.7 (L)  18.0 - 48.0 % Final    Mono % 08/08/2024 7.0  4.0 - 15.0 % Final    Eosinophil % 08/08/2024 0.5  0.0 - 8.0 % Final    Basophil % 08/08/2024 0.4  0.0 - 1.9 % Final    Differential Method 08/08/2024 Automated   Final    Sodium 08/08/2024 139  136 - 145 mmol/L Final    Potassium 08/08/2024 3.7  3.5 - 5.1 mmol/L Final    Chloride 08/08/2024 103  95 - 110 mmol/L Final    CO2 08/08/2024 23  23 - 29 mmol/L Final    Glucose 08/08/2024 97  70 - 110 mg/dL Final    BUN 08/08/2024 16  8 - 23 mg/dL Final    Creatinine 08/08/2024 1.6 (H)  0.5 - 1.4 mg/dL Final    Calcium 08/08/2024 11.0 (H)  8.7 - 10.5 mg/dL Final    Total Protein 08/08/2024 8.7 (H)  6.0 - 8.4 g/dL Final    Albumin 08/08/2024 4.4  3.5 - 5.2 g/dL Final    Total Bilirubin 08/08/2024 1.1 (H)  0.1 - 1.0 mg/dL Final    Comment: For infants and newborns, interpretation of results should be based  on gestational age, weight and in agreement with clinical  observations.    Premature Infant recommended reference ranges:  Up to 24 hours.............<8.0 mg/dL  Up to 48 hours............<12.0 mg/dL  3-5  days..................<15.0 mg/dL  6-29 days.................<15.0 mg/dL      Alkaline Phosphatase 08/08/2024 45 (L)  55 - 135 U/L Final    AST 08/08/2024 19  10 - 40 U/L Final    ALT 08/08/2024 9 (L)  10 - 44 U/L Final    eGFR 08/08/2024 48 (A)  >60 mL/min/1.73 m^2 Final    Anion Gap 08/08/2024 13  8 - 16 mmol/L Final    Lipase 08/08/2024 32  4 - 60 U/L Final    Specimen UA 08/09/2024 Urine, Clean Catch   Final    Color, UA 08/09/2024 Yellow  Yellow, Straw, Suzy Final    Appearance, UA 08/09/2024 Clear  Clear Final    pH, UA 08/09/2024 6.0  5.0 - 8.0 Final    Specific Gravity, UA 08/09/2024 1.020  1.005 - 1.030 Final    Protein, UA 08/09/2024 Negative  Negative Final    Comment: Recommend a 24 hour urine protein or a urine   protein/creatinine ratio if globulin induced proteinuria is  clinically suspected.      Glucose, UA 08/09/2024 Negative  Negative Final    Ketones, UA 08/09/2024 1+ (A)  Negative Final    Bilirubin (UA) 08/09/2024 Negative  Negative Final    Occult Blood UA 08/09/2024 1+ (A)  Negative Final    Nitrite, UA 08/09/2024 Negative  Negative Final    Urobilinogen, UA 08/09/2024 Negative  <2.0 EU/dL Final    Leukocytes, UA 08/09/2024 Negative  Negative Final    QRS Duration 08/08/2024 74  ms Final    [facility] QTC Calculation 08/08/2024 460  ms Final    POC Glucose 08/08/2024 98  70 - 110 mg/dL Final    POC BUN 08/08/2024 19  6 - 30 mg/dL Final    POC Creatinine 08/08/2024 1.6 (H)  0.5 - 1.4 mg/dL Final    POC Sodium 08/08/2024 136  136 - 145 mmol/L Final    POC Potassium 08/08/2024 4.3  3.5 - 5.1 mmol/L Final    POC Chloride 08/08/2024 105  95 - 110 mmol/L Final    POC TCO2 (MEASURED) 08/08/2024 26  23 - 29 mmol/L Final    POC Anion Gap 08/08/2024 11  8 - 16 mmol/L Final    POC Ionized Calcium 08/08/2024 1.29  1.06 - 1.42 mmol/L Final    POC Hematocrit 08/08/2024 48  36 - 54 %PCV Final    Sample 08/08/2024 VENOUS   Final    Site 08/08/2024 Other   Final    Allens Test 08/08/2024 N/A   Final    DelSys  08/08/2024 Room Air   Final    RBC, UA 08/09/2024 6 (H)  0 - 4 /hpf Final    Squam Epithel, UA 08/09/2024 1  /hpf Final    Microscopic Comment 08/09/2024 SEE COMMENT   Final    Comment: Other formed elements not mentioned in the report are not   present in the microscopic examination.       Group & Rh 08/09/2024 A NEG   Final    Indirect Rey 08/09/2024 NEG   Final    Specimen Outdate 08/09/2024 08/12/2024 23:59   Final    Prothrombin Time 08/09/2024 11.6  9.0 - 12.5 sec Final    INR 08/09/2024 1.1  0.8 - 1.2 Final    Comment: Coumadin Therapy:  2.0 - 3.0 for INR for all indicators except mechanical heart valves  and antiphospholipid syndromes which should use 2.5 - 3.5.      Phosphorus 08/09/2024 2.9  2.7 - 4.5 mg/dL Final    Magnesium 08/09/2024 1.6  1.6 - 2.6 mg/dL Final    WBC 08/09/2024 10.30  3.90 - 12.70 K/uL Final    RBC 08/09/2024 4.74  4.60 - 6.20 M/uL Final    Hemoglobin 08/09/2024 14.2  14.0 - 18.0 g/dL Final    Hematocrit 08/09/2024 41.0  40.0 - 54.0 % Final    MCV 08/09/2024 87  82 - 98 fL Final    MCH 08/09/2024 30.0  27.0 - 31.0 pg Final    MCHC 08/09/2024 34.6  32.0 - 36.0 g/dL Final    RDW 08/09/2024 15.0 (H)  11.5 - 14.5 % Final    Platelets 08/09/2024 184  150 - 450 K/uL Final    MPV 08/09/2024 11.0  9.2 - 12.9 fL Final    Immature Granulocytes 08/09/2024 0.3  0.0 - 0.5 % Final    Gran # (ANC) 08/09/2024 8.0 (H)  1.8 - 7.7 K/uL Final    Immature Grans (Abs) 08/09/2024 0.03  0.00 - 0.04 K/uL Final    Comment: Mild elevation in immature granulocytes is non specific and   can be seen in a variety of conditions including stress response,   acute inflammation, trauma and pregnancy. Correlation with other   laboratory and clinical findings is essential.      Lymph # 08/09/2024 1.4  1.0 - 4.8 K/uL Final    Mono # 08/09/2024 0.8  0.3 - 1.0 K/uL Final    Eos # 08/09/2024 0.0  0.0 - 0.5 K/uL Final    Baso # 08/09/2024 0.04  0.00 - 0.20 K/uL Final    nRBC 08/09/2024 0  0 /100 WBC Final    Gran %  08/09/2024 77.2 (H)  38.0 - 73.0 % Final    Lymph % 08/09/2024 13.8 (L)  18.0 - 48.0 % Final    Mono % 08/09/2024 8.0  4.0 - 15.0 % Final    Eosinophil % 08/09/2024 0.3  0.0 - 8.0 % Final    Basophil % 08/09/2024 0.4  0.0 - 1.9 % Final    Differential Method 08/09/2024 Automated   Final    Sodium 08/09/2024 139  136 - 145 mmol/L Final    Potassium 08/09/2024 3.6  3.5 - 5.1 mmol/L Final    Chloride 08/09/2024 105  95 - 110 mmol/L Final    CO2 08/09/2024 24  23 - 29 mmol/L Final    Glucose 08/09/2024 99  70 - 110 mg/dL Final    BUN 08/09/2024 16  8 - 23 mg/dL Final    Creatinine 08/09/2024 1.4  0.5 - 1.4 mg/dL Final    Calcium 08/09/2024 10.4  8.7 - 10.5 mg/dL Final    Total Protein 08/09/2024 7.5  6.0 - 8.4 g/dL Final    Albumin 08/09/2024 3.8  3.5 - 5.2 g/dL Final    Total Bilirubin 08/09/2024 1.0  0.1 - 1.0 mg/dL Final    Comment: For infants and newborns, interpretation of results should be based  on gestational age, weight and in agreement with clinical  observations.    Premature Infant recommended reference ranges:  Up to 24 hours.............<8.0 mg/dL  Up to 48 hours............<12.0 mg/dL  3-5 days..................<15.0 mg/dL  6-29 days.................<15.0 mg/dL      Alkaline Phosphatase 08/09/2024 35 (L)  55 - 135 U/L Final    AST 08/09/2024 21  10 - 40 U/L Final    ALT 08/09/2024 9 (L)  10 - 44 U/L Final    eGFR 08/09/2024 56 (A)  >60 mL/min/1.73 m^2 Final    Anion Gap 08/09/2024 10  8 - 16 mmol/L Final    Tacrolimus Lvl 08/09/2024 3.1 (L)  5.0 - 15.0 ng/mL Final    Comment: Testing performed by a chemiluminescent microparticle   immunoassay on the [employer] i System.    CAUTION: No firm therapeutic range exists for tacrolimus in whole   blood. The   complexity of the clinical state, individual differences in   sensitivity to   immunosuppressive and nephrotoxic effects of tacrolimus,   co-administration   of other immunosuppressants, type of transplant, time post-transplant   and a   number of  other factors contribute to different requirements for   optimal   blood levels of tacrolimus. Therefore, individual tacrolimus values   cannot   be used as the sole indicator for making changes in treatment regimen   and   each patient should be thoroughly evaluated clinically before changes   in   treatment regimens are made. Each user must establish his or her own   ranges   based on clinical experience.  Therapeutic ranges vary according to the commercial test used, and   therefore   should be established for each commercial test. Values obtained with   diff                           erent assay methods cannot be used interchangeably due to   differences in   assay methods and cross-reactivity with metabolites, nor should   correction   factors be applied. Therefore, consistent use of one assay for   individual   patients is recommended.          Imaging Reviewed    Imaging Results              CT Abdomen Pelvis With IV Contrast NO Oral Contrast (Final result)  Result time 08/09/24 03:23:13      Final result by Bro Khalil MD (08/09/24 03:23:13)                   Impression:      3 mm micronodule right lung base.  For a solid nodule <6 mm, Fleischner Society 2017 guidelines recommend no routine follow up for a low risk patient, or follow-up with non-contrast chest CT at 12 months in a high risk patient.  New    3 cm solid mass upper pole right kidney, possible renal cell carcinoma.  Suggest urology consultation.  Similar in appearance compared to recent prior.    Moderate ascites.    Mild diffuse wall thickening several bowel loops lower abdomen/pelvis.  Possible enteritis.  No bowel obstruction.      Electronically signed by: Bro Khalil MD  Date:    08/09/2024  Time:    03:23               Narrative:    EXAMINATION:  CT ABDOMEN PELVIS WITH IV CONTRAST    CLINICAL HISTORY:  Bowel obstruction suspected;Nausea/vomiting;    TECHNIQUE:  Low dose axial images, sagittal and coronal reformations were  obtained from the lung bases to the pubic symphysis following the IV administration of 80 mL of Omnipaque 350 .  Oral contrast was not administered.    COMPARISON:  05/01/2024.    FINDINGS:  Abdomen:    - Lower thorax:Small hiatal hernia.    - Lung bases: 3 mm micronodule right middle lobe.    - Liver: No focal mass.    - Gallbladder: No calcified gallstones.    - Bile Ducts: No evidence of intra or extra hepatic biliary ductal dilation.    - Spleen: Normal in size.  Mildly heterogeneous enhancement.    - Kidneys: Bilateral chronic atrophy of the native kidneys.  No hydronephrosis.  3 cm solid mass upper pole right kidney, similar in size and appearance compared to prior study.  Right lower quadrant renal transplant.    - Adrenals: Unremarkable.    - Pancreas: No mass or peripancreatic fat stranding.    - Retroperitoneum:  No significant adenopathy.    - Vascular: No abdominal aortic aneurysm.    - Abdominal wall:  Unremarkable.    Pelvis:    Moderate ascites about the liver, spleen, right pericolic gutter and in the pelvis.    Bowel/Mesentery:    No bowel obstruction.  Mild diffuse wall thickening several bowel loops in the lower abdomen/pelvis.    Bones:  No significant change from prior.                                      Medications given in ED    Medications   sodium chloride 0.9% bolus 1,000 mL 1,000 mL (0 mLs Intravenous Stopped 8/9/24 0454)   dicyclomine injection 20 mg (20 mg Intramuscular Given 8/9/24 0244)   aluminum-magnesium hydroxide-simethicone 200-200-20 mg/5 mL suspension 30 mL (30 mLs Oral Given 8/9/24 0241)   famotidine (PF) injection 20 mg (20 mg Intravenous Given 8/9/24 0242)   iohexoL (OMNIPAQUE 350) injection 80 mL (80 mLs Intravenous Given 8/9/24 0222)   LIDOcaine HCL 10 mg/ml (1%) injection (10 mLs Other Given 8/9/24 0826)       Note was created using voice recognition software. Note may have occasional typographical errors that may not have been identified and edited despite good andria  initial review prior to signing.           I, Be Cedillo MD, personally performed the services described in this documentation. All medical record entries made by the scribe were at my direction and in my presence.  I have reviewed the chart and agree that the record reflects my personal performance and is accurate and complete.      Clinical Impression:  Final diagnoses:  [R00.0] Tachycardia  [R10.31, R10.32] Bilateral lower abdominal pain (Primary)  [R18.8] Other ascites          ED Disposition Condition    Admit                 Be Cedillo MD  08/15/24 0803

## 2024-08-09 NOTE — ASSESSMENT & PLAN NOTE
Creatine stable for now. BMP reviewed- noted CrCl cannot be calculated (Unknown ideal weight.). according to latest data. Based on current GFR, CKD stage is stage 2 - GFR 60-89.  Monitor UOP and serial BMP and adjust therapy as needed. Renally dose meds. Avoid nephrotoxic medications and procedures.  Hydrated in ER with IVF's.

## 2024-08-09 NOTE — CARE UPDATE
Odin Downing's  ascites  and associated clinical symptoms improved after further testing/imaging in the time period spanning less than 2 midnights. This was an unexpected, rapid recovery. He was able to be discharged home to   follow up with his PCP in 1week.

## 2024-08-09 NOTE — PLAN OF CARE
Problem: Adult Inpatient Plan of Care  Goal: Plan of Care Review  Outcome: Adequate for Care Transition  Goal: Patient-Specific Goal (Individualized)  Outcome: Adequate for Care Transition  Goal: Absence of Hospital-Acquired Illness or Injury  Outcome: Adequate for Care Transition  Goal: Optimal Comfort and Wellbeing  Outcome: Adequate for Care Transition  Goal: Readiness for Transition of Care  Outcome: Adequate for Care Transition     Problem: Wound  Goal: Optimal Coping  Outcome: Adequate for Care Transition  Goal: Optimal Functional Ability  Outcome: Adequate for Care Transition  Goal: Absence of Infection Signs and Symptoms  Outcome: Adequate for Care Transition  Goal: Improved Oral Intake  Outcome: Adequate for Care Transition  Goal: Optimal Pain Control and Function  Outcome: Adequate for Care Transition  Goal: Skin Health and Integrity  Outcome: Adequate for Care Transition  Goal: Optimal Wound Healing  Outcome: Adequate for Care Transition

## 2024-08-09 NOTE — PLAN OF CARE
Case Management Final Discharge Note      Discharge Disposition: Home    New DME ordered / company name: None    Relevant SDOH / Transition of Care Barriers:  None    Primary Caretaker and contact information: Patient is independent; ReginaNolvia (Significant other) [phone]     Scheduled followup appointment: Follow up appointment with PCP scheduled and added to patient AVS    Referrals placed: Referral placed to Gastroenterology    Transportation: Patient stated he will call an Uber/Lyft        Patient and family educated on discharge services and updated on DC plan.  Patient to discharge home; he is independent but his significant other is able to assist him at home as needed.  CM unable to schedule PCP follow up within recommended timeframe due to primary clinic not having any providers with timely availability.  CM added [facility] to patient AVS.  Bedside RN notified, patient clear to discharge from Case Management Perspective.    08/09/24 1345   Final Note   Assessment Type Final Discharge Note   Anticipated Discharge Disposition Home   Hospital Resources/Appts/Education Provided Provided patient/caregiver with written discharge plan information;Community resources provided;Appointments scheduled and added to AVS   Post-Acute Status   Coverage MEDICARE - MEDICARE A ONLY   Discharge Delays None known at this time

## 2024-08-09 NOTE — PROCEDURES
Radiology Post-Procedure Note    Pre Op Diagnosis: Ascites  Post Op Diagnosis: Same    Procedure: Ultrasound Guided Paracentesis    Procedure performed by: Pattie Houston NP    Written Informed Consent Obtained: Yes  Specimen Removed: NO. Unable to safely cross peritoneum due to tenting with 21 gauge and 22 gauge needle.   Estimated Blood Loss: Minimal    Findings:   Attempted RUQ paracentesis.  Unable to safely cross peritoneum due to tending with needle. Procedure aborted. No immediate complications.      Pattie Houston NP  Interventional Radiology

## 2024-08-09 NOTE — NURSING
AVS virtually reviewed with patient in its entirety with emphasis on diet, medications, follow-up appointments and reasons to return to the ED or contact the [facility] On Call Nurse Care Line. Patient also encouraged to utilize their patient portal. Ease and convenience of use reiterated. Education complete and patient voiced understanding. All questions answered. Discharge teaching completed.

## 2024-08-09 NOTE — PLAN OF CARE
Procedure finished. Patient tolerated well without s/sx of complications. Please see provider note for details. Band aid applied, CDI. Patient to be transferred back to room. Report called to QUOC Casarez.

## 2024-08-09 NOTE — H&P
"  [facility] Medicine  History & Physical    Patient Name: Odin Downing  MRN: [id number]  Patient Class: IP- Inpatient  Admission Date: 8/9/2024  Attending Physician: Rafi Garcia MD   Primary Care Provider: Karen Tang MD         Patient information was obtained from patient and ER records.     Subjective:     Principal Problem:Abdominal pain    Chief Complaint:   Chief Complaint   Patient presents with    Abdominal Pain     Pt w/ hx of kidney tx in 2016 presents w/ c/o of severe lower abd pain right under umbilicus. Pain is 10/10. Pt states that around 2 months ago he was experiencing same pain and had imaging studies which revealed "a mass around my kidneys, but they said it wasn't affecting my kidneys." Pt denies urinary symptoms. Pt c/o n/v and decreased appetite.     Kidney Transplant     Hx of transplant 2016        HPI: 63-year-old male with a past medical history of kidney transplant (2016), CKD 2, hypertension, renal mass who presents with abdominal pain.  Patient presents for evaluation of acute on chronic abdominal pain.  Associated symptoms include 2 episodes of emesis, and decreased appetite.  He had a similar presentation on 07/08 at [facility], where he was found to have a small amount of ascites and was treated symptomatically.  He also had a recent hospitalization (5/1-5/3) for the same issue.  Oncology was consulted at that time and recommended Urology follow-up.  Abdominal pain is sometimes exacerbated by certain foods like chocolate.  No alleviating factors.  No diarrhea or change in bowel movements.  In the ED, the patient was tachycardic (120s >100s) and hypertensive.  Labs were remarkable for an elevated creatinine (1.6-at baseline).  CT abdomen and pelvis showed 3 cm solid mass of the upper pole of the right kidney, possible RCC, and a 3 mm micronodule of the right lung base with questionable enteritis.  He is currently feeling better with no further " complaints.    Past Medical History:   Diagnosis Date    Anemia of chronic renal failure 12/3/2013    Blind right eye     Cataract     CKD (chronic kidney disease) stage 2, GFR 60-89 ml/min 2/24/2016    CKD (chronic kidney disease) stage 2, GFR 60-89 ml/min 2/24/2016    ESRD (end stage renal disease) 2008    Hypertension     Hypomagnesemia 1/18/2017    Hypophosphatemia 1/18/2017    Latent TB - 4/7/11 - INH x 9 months 12/3/2013    Pulmonary hypertension - PASP 58mmHg (9/13) 12/3/2013    Pulmonary hypertension - PASP 58mmHg (9/13) improved on recent echo PA pressure of 13 12/3/2013    S/P kidney transplant 1/28/2016     Secondary hyperparathyroidism, renal        Past Surgical History:   Procedure Laterality Date    APPENDECTOMY      AV FISTULA PLACEMENT      EUSEBIO    COLONOSCOPY      CORNEAL TRANSPLANT  01/01/1997    LEFT EYE    CORNEAL TRANSPLANT  01/01/2010    RIGHT EYE    CORNEAL TRANSPLANT  01/01/2000    LEFT EYE    EYE SURGERY      KIDNEY TRANSPLANT  01/28/2016    TONSILLECTOMY         Review of patient's allergies indicates:  No Known Allergies    No current facility-administered medications on file prior to encounter.     Current Outpatient Medications on File Prior to Encounter   Medication Sig    carvediloL (COREG) 12.5 MG tablet Take 1 tablet (12.5 mg total) by mouth 2 (two) times daily.    mycophenolate (CELLCEPT) 250 mg Cap Take 2 capsules (500 mg total) by mouth 2 (two) times daily.    pantoprazole (PROTONIX) 40 MG tablet Take 1 tablet (40 mg total) by mouth once daily.    tacrolimus (PROGRAF) 1 MG Cap Take 2 capsules (2 mg total) by mouth every 12 (twelve) hours.     Family History       Problem Relation (Age of Onset)    Cancer Maternal Aunt    Hypertension Mother    Kidney disease Maternal Aunt          Tobacco Use    Smoking status: Never    Smokeless tobacco: Never   Substance and Sexual Activity    Alcohol use: Yes     Comment: beer rarely    Drug use: No    Sexual activity: Yes     Partners: Female      Review of Systems   Constitutional:  Positive for appetite change and fatigue.   HENT:  Negative for ear discharge and ear pain.    Eyes:  Negative for discharge and itching.   Respiratory:  Negative for cough and shortness of breath.    Cardiovascular:  Negative for chest pain and palpitations.   Gastrointestinal:  Positive for abdominal pain and nausea.   Endocrine: Negative for cold intolerance and heat intolerance.   Genitourinary:  Negative for difficulty urinating and dysuria.   Musculoskeletal:  Negative for neck pain and neck stiffness.   Skin:  Negative for rash and wound.   Neurological:  Negative for seizures and syncope.   Psychiatric/Behavioral:  Negative for agitation and hallucinations.      Objective:     Vital Signs (Most Recent):  Temp: 98.1 °F (36.7 °C) (08/09/24 1212)  Pulse: 79 (08/09/24 1212)  Resp: 18 (08/09/24 1212)  BP: 113/75 (08/09/24 1212)  SpO2: 97 % (08/09/24 1212) Vital Signs (24h Range):  Temp:  [98.1 °F (36.7 °C)-98.9 °F (37.2 °C)] 98.1 °F (36.7 °C)  Pulse:  [] 79  Resp:  [16-20] 18  SpO2:  [95 %-98 %] 97 %  BP: (113-163)/(75-96) 113/75        There is no height or weight on file to calculate BMI.     Physical Exam  Constitutional:       Appearance: He is not toxic-appearing or diaphoretic.   HENT:      Head: Normocephalic and atraumatic.      Mouth/Throat:      Pharynx: Oropharynx is clear. No oropharyngeal exudate or posterior oropharyngeal erythema.   Cardiovascular:      Rate and Rhythm: Normal rate and regular rhythm.   Pulmonary:      Effort: No respiratory distress.      Breath sounds: Normal breath sounds.   Abdominal:      Palpations: Abdomen is soft.      Tenderness: There is abdominal tenderness. There is no guarding or rebound.   Musculoskeletal:         General: No deformity or signs of injury.   Skin:     General: Skin is warm and dry.   Neurological:      Mental Status: He is oriented to person, place, and time.      Cranial Nerves: No cranial nerve deficit.                 Significant Labs: All pertinent labs within the past 24 hours have been reviewed.  BMP:   Recent Labs   Lab 08/09/24  0550   GLU 99      K 3.6      CO2 24   BUN 16   CREATININE 1.4   CALCIUM 10.4   MG 1.6     CBC:   Recent Labs   Lab 08/08/24 2058 08/08/24 2108 08/09/24  0550   WBC 11.99  --  10.30   HGB 15.8  --  14.2   HCT 46.0 48 41.0     --  184       Significant Imaging: I have reviewed all pertinent imaging results/findings within the past 24 hours.  Assessment/Plan:     * Abdominal pain  Patient seems to have apparent acute on chronic abdominal pain with recent evaluations.  Known kidney mass.  CT now showing moderate ascites.  Unsure if ascites contributing to pain.  Afebrile with normal WBC.  Doubt SBP.  IR consulted for paracentesis.  Supportive care with pain medications and anti emetics.      Ascites  IR consulted as above.      Renal mass, right  Follow up with Urology as already scheduled.      CKD (chronic kidney disease) stage 2, GFR 60-89 ml/min  Creatine stable for now. BMP reviewed- noted CrCl cannot be calculated (Unknown ideal weight.). according to latest data. Based on current GFR, CKD stage is stage 2 - GFR 60-89.  Monitor UOP and serial BMP and adjust therapy as needed. Renally dose meds. Avoid nephrotoxic medications and procedures.  Hydrated in ER with IVF's.    S/P kidney transplant 1/28/2016  Stable.      Long-term use of immunosuppressant medication  Resume home medications.      Anemia of chronic renal failure  Anemia is likely due to chronic disease due to Chronic Kidney Disease. Most recent hemoglobin and hematocrit are listed below.  Recent Labs     08/08/24 2058 08/08/24 2108 08/09/24  0550   HGB 15.8  --  14.2   HCT 46.0 48 41.0     Plan  - Monitor serial CBC: Daily  - Transfuse PRBC if patient becomes hemodynamically unstable, symptomatic or H/H drops below 7/21.  - Patient has not received any PRBC transfusions to date  - Patient's anemia is  currently stable    Hypertension  Chronic, stable. Latest blood pressure and vitals reviewed-     Temp:  [98.1 °F (36.7 °C)-98.9 °F (37.2 °C)]   Pulse:  []   Resp:  [16-20]   BP: (113-163)/(75-96)   SpO2:  [95 %-98 %] .   Home meds for hypertension were reviewed and noted below.   Hypertension Medications               carvediloL (COREG) 12.5 MG tablet Take 1 tablet (12.5 mg total) by mouth 2 (two) times daily.            While in the hospital, will manage blood pressure as follows; Continue home antihypertensive regimen    Will utilize p.r.n. blood pressure medication only if patient's blood pressure greater than 180/110 and he develops symptoms such as worsening chest pain or shortness of breath.      VTE Risk Mitigation (From admission, onward)           Ordered     IP VTE LOW RISK PATIENT  Once         08/09/24 0426     Place sequential compression device  Until discontinued         08/09/24 0426                                    Rafi Garcia MD  Department of Hospital Medicine  [facility] [place] [facility]

## 2024-08-09 NOTE — ASSESSMENT & PLAN NOTE
Anemia is likely due to chronic disease due to Chronic Kidney Disease. Most recent hemoglobin and hematocrit are listed below.  Recent Labs     08/08/24 2058 08/08/24 2108 08/09/24  0550   HGB 15.8  --  14.2   HCT 46.0 48 41.0     Plan  - Monitor serial CBC: Daily  - Transfuse PRBC if patient becomes hemodynamically unstable, symptomatic or H/H drops below 7/21.  - Patient has not received any PRBC transfusions to date  - Patient's anemia is currently stable

## 2024-08-09 NOTE — HOSPITAL COURSE
62 y/o male presents with abdominal pain.  Seems to be acute on chronic.  Known renal mass.  CT showing moderate ascites and possible enteritis.  Started on IVF's and IR consulted for paracentesis.  Attempted RUQ paracentesis.  Unable to safely cross peritoneum due to tending with needle. Procedure aborted.  Patient afebrile with normal WBC and improvement of symptoms.  Unlikely SBP.  Symptoms sometimes exacerbated by some foods.  Discussed and counseled patient on avoiding these foods.  He will be referred to GI for follow up.  He has remained afebrile and hemodynamically stable.  Currently tolerating diet without issues and abdominal pain improved.  He is already scheduled to follow up with Urology next week for kidney mass.  He will be discharged home to follow up with PCP, Urology, GI and Nephrology.

## 2024-08-09 NOTE — CARE UPDATE
This is a 63-year-old male with a past medical history of kidney transplant (2016), CKD 2, hypertension, renal mass who presents with abdominal pain.      Patient presents for evaluation of acute on chronic abdominal pain.  Associated symptoms include 2 episodes of emesis, and decreased appetite.  He hit a similar presentation on 07/08 at [facility],was found to have a small amount of ascites and was treated symptomatically.  He also had a recent hospitalization (5/1-5/3) for the same issue.  Oncology was consulted at that time and recommended urology follow-up. He was scheduled an appointment which  was cancelled due to insurance issues.     In the ED, the patient was tachycardic (120s >100s) and hypertensive.  Labs were remarkable for an elevated creatinine (1.6-at baseline).  CT abdomen and pelvis showed 3 cm solid mass of the upper pole of the right kidney, possible RCC, and a 3 mm micronodule of the right lung base with questionable enteritis.  Patient was given 1 L of NS, Pepcid 20 mg IV, Bentyl 20 mg IM, and Maalox.  He was admitted for further management.      #Ascites  IR consulted   Fluid studies ordered    #Renal Mass  Urology appointment scheduled on 9/12

## 2024-08-09 NOTE — CONSULTS
Interventional Radiology   Consult/History & Physical      Date: 8/9/2024   Primary team: Networked reference to record PCT , Rafi Garcia MD   Room/bed: W428/W428 A    Inpatient consult to Interventional Radiology  Consult performed by: Pattie Houston, NP  Consult ordered by: James Boyer MD           Ascites, request for diagnostic paracentesis.     History of Present Illness:  Odin Downing is a 63 y.o. male with CKD II (s/p renal transplant 2016), pulmonary HTN and HTN presented to OSH ED early this am with c/o abd pain for several months with associated N/V/D. CT A/P showed mild diffuse bowel wall thickening compatible with enteritis as well as ascites around liver and spleen. No leukocytosis. Pt is afebrile.     Interventional Radiology has been consulted for diagnostic paracentesis.     Past Medical History:  Past Medical History:   Diagnosis Date    Anemia of chronic renal failure 12/3/2013    Blind right eye     Cataract     CKD (chronic kidney disease) stage 2, GFR 60-89 ml/min 2/24/2016    CKD (chronic kidney disease) stage 2, GFR 60-89 ml/min 2/24/2016    ESRD (end stage renal disease) 2008    Hypertension     Hypomagnesemia 1/18/2017    Hypophosphatemia 1/18/2017    Latent TB - 4/7/11 - INH x 9 months 12/3/2013    Pulmonary hypertension - PASP 58mmHg (9/13) 12/3/2013    Pulmonary hypertension - PASP 58mmHg (9/13) improved on recent echo PA pressure of 13 12/3/2013    S/P kidney transplant 1/28/2016     Secondary hyperparathyroidism, renal        Past Surgical History:  Past Surgical History:   Procedure Laterality Date    APPENDECTOMY      AV FISTULA PLACEMENT      EUSEBIO    COLONOSCOPY      CORNEAL TRANSPLANT  01/01/1997    LEFT EYE    CORNEAL TRANSPLANT  01/01/2010    RIGHT EYE    CORNEAL TRANSPLANT  01/01/2000    LEFT EYE    EYE SURGERY      KIDNEY TRANSPLANT  01/28/2016    TONSILLECTOMY          Sedation History:    No known adverse reactions.     Social History:  Social History      Tobacco Use    Smoking status: Never    Smokeless tobacco: Never   Substance Use Topics    Alcohol use: Yes     Comment: beer rarely    Drug use: No        Home Medications:   Prior to Admission medications    Medication Sig Start Date End Date Taking? Authorizing Provider   carvediloL (COREG) 12.5 MG tablet Take 1 tablet (12.5 mg total) by mouth 2 (two) times daily. 5/3/24 8/1/24  Alisia Medina MD   mycophenolate (CELLCEPT) 250 mg Cap Take 2 capsules (500 mg total) by mouth 2 (two) times daily. 5/14/24 5/15/25  Alisia Medina MD   pantoprazole (PROTONIX) 40 MG tablet Take 1 tablet (40 mg total) by mouth once daily. 5/3/24 5/3/25  Alisia Medina MD   tacrolimus (PROGRAF) 1 MG Cap Take 2 capsules (2 mg total) by mouth every 12 (twelve) hours. 9/5/23 9/5/24  Amrik Hughes MD       Inpatient Medications:    Current Facility-Administered Medications:     acetaminophen tablet 650 mg, 650 mg, Oral, Q8H PRN, Florencio Estrada MD    acetaminophen tablet 650 mg, 650 mg, Oral, Q4H PRNNatalie Omar, MD    albuterol-ipratropium 2.5 mg-0.5 mg/3 mL nebulizer solution 3 mL, 3 mL, Nebulization, Q4H PRNNatalie Omar, MD    aluminum-magnesium hydroxide-simethicone 200-200-20 mg/5 mL suspension 30 mL, 30 mL, Oral, QID PRNNatalie Omar, MD    bisacodyL suppository 10 mg, 10 mg, Rectal, Daily PRNNatalie Omar, MD    carvediloL tablet 12.5 mg, 12.5 mg, Oral, BID, Florencio Estrada MD    dextrose 10% bolus 125 mL 125 mL, 12.5 g, Intravenous, PRNNatalie Omar, MD    dextrose 10% bolus 250 mL 250 mL, 25 g, Intravenous, PRNatalie STORY Omar, MD    glucagon (human recombinant) injection 1 mg, 1 mg, Intramuscular, PRN, Florencio Estrada MD    glucose chewable tablet 16 g, 16 g, Oral, PRN, Florencio Estrada MD    glucose chewable tablet 24 g, 24 g, Oral, PRN, Florencio Estrada MD    magnesium oxide tablet 800 mg, 800 mg, Oral, PRNNatalie Omar, MD    magnesium oxide tablet 800 mg, 800 mg, Oral, PRNatalie STORY Omar, MD    melatonin tablet 6 mg, 6 mg,  Oral, Nightly PRN, Florencio Estrada MD    mycophenolate capsule 500 mg, 500 mg, Oral, BID, Florencio Estrada MD    naloxone 0.4 mg/mL injection 0.02 mg, 0.02 mg, Intravenous, PRN, Florencio Estrada MD    ondansetron injection 4 mg, 4 mg, Intravenous, Q8H PRN, Florencio Estrada MD    pantoprazole EC tablet 40 mg, 40 mg, Oral, Daily, Florencio Estrada MD, 40 mg at 08/09/24 0510    polyethylene glycol packet 17 g, 17 g, Oral, Daily, Florencio Estrada MD    potassium bicarbonate disintegrating tablet 35 mEq, 35 mEq, Oral, PRN, Florencio Estrada MD    potassium bicarbonate disintegrating tablet 50 mEq, 50 mEq, Oral, PRN, Florencio Estrada MD    potassium bicarbonate disintegrating tablet 60 mEq, 60 mEq, Oral, PRN, Florencio Estrada MD    potassium, sodium phosphates 280-160-250 mg packet 2 packet, 2 packet, Oral, PRNNatalie Omar, MD    potassium, sodium phosphates 280-160-250 mg packet 2 packet, 2 packet, Oral, PRN, Florencio Estrada MD    potassium, sodium phosphates 280-160-250 mg packet 2 packet, 2 packet, Oral, PRN, Florencio Estrada MD    prochlorperazine injection Soln 5 mg, 5 mg, Intravenous, Q6H PRN, Florencio Estrada MD    simethicone chewable tablet 80 mg, 1 tablet, Oral, QID PRN, Florencio Estrada MD    sodium chloride 0.9% flush 10 mL, 10 mL, Intravenous, Q12H PRN, Florencio Estrada MD    tacrolimus capsule 2 mg, 2 mg, Oral, BID, Florencio Estrada MD     Anticoagulants/Antiplatelets:   No anticoagulation    Allergies:   Review of patient's allergies indicates:  No Known Allergies    Review of Systems:   As documented in primary provider H&P.    Vital Signs:  Temp: 98.3 °F (36.8 °C) (08/09/24 0720)  Pulse: 91 (08/09/24 0720)  Resp: 16 (08/09/24 0720)  BP: 114/78 (08/09/24 0720)  SpO2: 97 % (08/09/24 0720)    Temp:  [98.3 °F (36.8 °C)-98.9 °F (37.2 °C)]   Pulse:  []   Resp:  [16-20]   BP: (114-163)/(77-96)   SpO2:  [95 %-98 %]      Physical Exam:  No acute distress, laying comfortably in bed, pleasant and cooperative  Regular  Breathing unlabored  Abdomen  non-distended  Extremities warm and well perfused    Sedation Exam:  ASA: II - Patient appears to have mild systemic disease, adequately controlled  Mallampati score: n/a    Laboratory:  Lab Results   Component Value Date    INR 1.1 08/09/2024       Lab Results   Component Value Date    WBC 10.30 08/09/2024    HGB 14.2 08/09/2024    HCT 41.0 08/09/2024    MCV 87 08/09/2024     08/09/2024      Lab Results   Component Value Date    GLU 99 08/09/2024     08/09/2024    K 3.6 08/09/2024     08/09/2024    CO2 24 08/09/2024    BUN 16 08/09/2024    CREATININE 1.4 08/09/2024    CALCIUM 10.4 08/09/2024    MG 1.6 08/09/2024    ALT 9 (L) 08/09/2024    AST 21 08/09/2024    ALBUMIN 3.8 08/09/2024    BILITOT 1.0 08/09/2024    BILIDIR 0.3 01/05/2022       Imaging:   CT a/p 8/9 Reviewed.      ASSESSMENT/PLAN/RECOMMENDATIONS:   64 yo M with CKD II (s/p renal transplant 2016), pulmonary HTN and HTN admitted with abd pain N/V/D. Imaging findings compatible with enteritis. Noted to have ascites around liver and spleen.     Interventional Radiology consulted for diagnostic paracentesis.     Sedation Plan: local anesthetic only  Patient will undergo: Will assess if there is a safe pocket for diagnostic paracentesis. If present, will perform US guided diagnostic paracentesis.       Thank you for this consult. Please contact via Epic secure chat with questions.     Pattie Houston NP  Interventional Radiology

## 2024-08-09 NOTE — ASSESSMENT & PLAN NOTE
Patient seems to have apparent acute on chronic abdominal pain with recent evaluations.  Known kidney mass.  CT now showing moderate ascites.  Unsure if ascites contributing to pain.  Afebrile with normal WBC.  Doubt SBP.  IR consulted for paracentesis.  Supportive care with pain medications and anti emetics.

## 2024-08-09 NOTE — HPI
63-year-old male with a past medical history of kidney transplant (2016), CKD 2, hypertension, renal mass who presents with abdominal pain.  Patient presents for evaluation of acute on chronic abdominal pain.  Associated symptoms include 2 episodes of emesis, and decreased appetite.  He had a similar presentation on 07/08 at [facility], where he was found to have a small amount of ascites and was treated symptomatically.  He also had a recent hospitalization (5/1-5/3) for the same issue.  Oncology was consulted at that time and recommended Urology follow-up.  Abdominal pain is sometimes exacerbated by certain foods like chocolate.  No alleviating factors.  No diarrhea or change in bowel movements.  In the ED, the patient was tachycardic (120s >100s) and hypertensive.  Labs were remarkable for an elevated creatinine (1.6-at baseline).  CT abdomen and pelvis showed 3 cm solid mass of the upper pole of the right kidney, possible RCC, and a 3 mm micronodule of the right lung base with questionable enteritis.  He is currently feeling better with no further complaints.

## 2024-08-09 NOTE — DISCHARGE SUMMARY
[facility] Medicine  Discharge Summary      Patient Name: Odin Downing  MRN: [id number]  SMITH: [id number]  Patient Class: IP- Inpatient  Admission Date: 8/9/2024  Hospital Length of Stay: 0 days  Discharge Date and Time:  08/09/2024 1:42 PM  Attending Physician: Rafi Garcia MD   Discharging Provider: Rafi Garcia MD  Primary Care Provider: Karen Tang MD    Primary Care Team: Networked reference to record PCT     HPI:   63-year-old male with a past medical history of kidney transplant (2016), CKD 2, hypertension, renal mass who presents with abdominal pain.  Patient presents for evaluation of acute on chronic abdominal pain.  Associated symptoms include 2 episodes of emesis, and decreased appetite.  He had a similar presentation on 07/08 at [facility], where he was found to have a small amount of ascites and was treated symptomatically.  He also had a recent hospitalization (5/1-5/3) for the same issue.  Oncology was consulted at that time and recommended Urology follow-up.  Abdominal pain is sometimes exacerbated by certain foods like chocolate.  No alleviating factors.  No diarrhea or change in bowel movements.  In the ED, the patient was tachycardic (120s >100s) and hypertensive.  Labs were remarkable for an elevated creatinine (1.6-at baseline).  CT abdomen and pelvis showed 3 cm solid mass of the upper pole of the right kidney, possible RCC, and a 3 mm micronodule of the right lung base with questionable enteritis.  He is currently feeling better with no further complaints.    * No surgery found *      Hospital Course:   62 y/o male presents with abdominal pain.  Seems to be acute on chronic.  Known renal mass.  CT showing moderate ascites and possible enteritis.  Started on IVF's and IR consulted for paracentesis.  Attempted RUQ paracentesis.  Unable to safely cross peritoneum due to tending with needle. Procedure aborted.  Patient afebrile with normal WBC and improvement  of symptoms.  Unlikely SBP.  Symptoms sometimes exacerbated by some foods.  Discussed and counseled patient on avoiding these foods.  He will be referred to GI for follow up.  He has remained afebrile and hemodynamically stable.  Currently tolerating diet without issues and abdominal pain improved.  He is already scheduled to follow up with Urology next week for kidney mass.  He will be discharged home to follow up with PCP, Urology, GI and Nephrology.     Goals of Care Treatment Preferences:  Code Status: Full Code    Living Will: Yes                 Consults:   Consults (From admission, onward)          Status Ordering Provider     Inpatient consult to Interventional Radiology  Once        Provider:  Brenda Morales RN    Completed LIBRADO ARIZMENDI            No new Assessment & Plan notes have been filed under this hospital service since the last note was generated.  Service: Hospital Medicine    Final Active Diagnoses:    Diagnosis Date Noted POA    PRINCIPAL PROBLEM:  Abdominal pain [R10.9] 08/09/2024 Yes    Ascites [R18.8] 08/09/2024 Yes    Renal mass, right [N28.89] 05/02/2024 Yes    CKD (chronic kidney disease) stage 2, GFR 60-89 ml/min [N18.2] 02/24/2016 Yes    Long-term use of immunosuppressant medication [Z79.60]  Not Applicable    S/P kidney transplant 1/28/2016 [Z94.0]  Not Applicable    Anemia of chronic renal failure [N18.9, D63.1] 12/03/2013 Yes     Chronic    Hypertension [I10]  Yes      Problems Resolved During this Admission:       Discharged Condition: stable    Disposition: Home or Self Care    Follow Up:   Follow-up Information       The nearest emergency department.. Go to .    Why: As needed, If symptoms worsen             Karen Tang MD. Call today.    Specialties: Family Medicine, Internal Medicine  Why: to schedule an appointment, for re-evaluation of today's complaint, and ongoing care  Contact information:  [address] [place] [address] [phone]                [facility] - Urology. Call today.    Specialty: Urology  Why: to schedule an appointment for further work up of renal mass  Contact information:  [address]  [address]  General acute hospital [id number] [phone]  Additional information:  Please park in garage or Medical Ofc Bldg surface lot and check in at Medical Office Building [address].              [facility] - Gastroenterology. Call today.    Specialty: Gastroenterology  Why: to schedule an appointment, for re-evaluation of today's complaint  Contact information:  [address]  [address]  General acute hospital [id number] [phone]  Additional information:  Please park in garage or surface lot and use Medical Office Bldg entrance. Check in at [address]                         Patient Instructions:      Ambulatory referral/consult to Gastroenterology   Standing Status: Future   Referral Priority: Routine Referral Type: Consultation   Referral Reason: Specialty Services Required   Requested Specialty: Gastroenterology   Number of Visits Requested: 1     Diet renal     Notify your health care provider if you experience any of the following:  temperature >100.4     Notify your health care provider if you experience any of the following:  persistent nausea and vomiting or diarrhea     Notify your health care provider if you experience any of the following:  severe uncontrolled pain     Notify your health care provider if you experience any of the following:  difficulty breathing or increased cough     Notify your health care provider if you experience any of the following:  persistent dizziness, light-headedness, or visual disturbances     Notify your health care provider if you experience any of the following:  increased confusion or weakness     Activity as tolerated       Significant Diagnostic Studies: N/A    Pending Diagnostic Studies:       Procedure Component Value Units Date/Time    Tacrolimus level [[id number]] Collected: 08/09/24 0550    Order Status:  Sent Lab Status: No result     Specimen: Blood            Medications:  Reconciled Home Medications:      Medication List        START taking these medications      ondansetron 8 MG Tbdl  Commonly known as: ZOFRAN-ODT  Take 1 tablet (8 mg total) by mouth every 12 (twelve) hours as needed (nausea/vomiting).     oxyCODONE-acetaminophen 5-325 mg per tablet  Commonly known as: PERCOCET  Take 1 tablet by mouth every 8 (eight) hours as needed for Pain.            CONTINUE taking these medications      carvediloL 12.5 MG tablet  Commonly known as: COREG  Take 1 tablet (12.5 mg total) by mouth 2 (two) times daily.     mycophenolate 250 mg Cap  Commonly known as: CELLCEPT  Take 2 capsules (500 mg total) by mouth 2 (two) times daily.     pantoprazole 40 MG tablet  Commonly known as: PROTONIX  Take 1 tablet (40 mg total) by mouth once daily.     tacrolimus 1 MG Cap  Commonly known as: PROGRAF  Take 2 capsules (2 mg total) by mouth every 12 (twelve) hours.              Indwelling Lines/Drains at time of discharge:   Lines/Drains/Airways       Drain  Duration                  Hemodialysis AV Fistula Left upper arm -- days                    Time spent on the discharge of patient: >31 minutes         Rafi Garcia MD  Department of Hospital Medicine  [facility] [place] [facility] Surg

## 2024-08-10 LAB — TACROLIMUS BLD-MCNC: 3.1 NG/ML (ref 5–15)

## 2024-09-09 NOTE — PROGRESS NOTES
"Subjective:       Patient ID: Odin Downing is a pleasant 63 y.o. Black or  male patient    Chief Complaint: Hospital Follow Up      Patient is a pt I saw last on 02/03/2021, see my last notes.    HPI:     Patient with past medical history as per list of problems below coming today for hospital discharge follow-up visit.    He was admitted from 08/09-08/10/2024.  As per notes:  63-year-old male with a past medical history of kidney transplant (2016), CKD 2, hypertension, and renal mass presenting with abdominal pain.  Seems to be acute on chronic.  Known renal mass.  CT showing moderate ascites and possible enteritis.  Started on IVF's and IR consulted for paracentesis.  Attempted RUQ paracentesis.  Unable to safely cross peritoneum due to tending with needle. Procedure aborted.  Patient afebrile with normal WBC and improvement of symptoms.  Unlikely SBP.  Symptoms sometimes exacerbated by some foods.  Discussed and counseled patient on avoiding these foods.  He will be referred to GI for follow up."  He is here on his own. He reports feeling about the same, is to f-up at [facility] with Nephrologist in 2 days.   It is unclear who reordered his immunosuppressant treatment, I did this last, pt had no insurance for some time as per his report. He has been taking his medications faithfully.  He reports some abdominal pain that is unchanged, not reported to be too bad, and he has Nl BM and no N/V.  He is eager to f-up re: his health.  He is still working at [employer], security, bike patrol.      Patient Active Problem List   Diagnosis    Status post corneal transplant - Both Eyes    Keratoconus, unspecified - Both Eyes    Rejection of corneal graft - Right Eye    Herpes simplex keratitis    Hypertension    Secondary hyperparathyroidism, renal    Anemia of chronic renal failure    Prophylactic immunotherapy    Long-term use of immunosuppressant medication    S/P kidney transplant 1/28/2016    Positive blood " culture in cadaveric donor    CKD (chronic kidney disease) stage 2, GFR 60-89 ml/min    BK viremia    LUQ abdominal pain    Renal mass, right    Ascites    Abdominal pain    Chronic kidney disease, stage 3a         PAST MEDICAL HISTORY  Past Medical History:   Diagnosis Date    Anemia of chronic renal failure 12/3/2013    Blind right eye     Cataract     CKD (chronic kidney disease) stage 2, GFR 60-89 ml/min 2/24/2016    CKD (chronic kidney disease) stage 2, GFR 60-89 ml/min 2/24/2016    ESRD (end stage renal disease) 2008    Hypertension     Hypomagnesemia 1/18/2017    Hypophosphatemia 1/18/2017    Latent TB - 4/7/11 - INH x 9 months 12/3/2013    Pulmonary hypertension - PASP 58mmHg (9/13) 12/3/2013    Pulmonary hypertension - PASP 58mmHg (9/13) improved on recent echo PA pressure of 13 12/3/2013    S/P kidney transplant 1/28/2016     Secondary hyperparathyroidism, renal         PAST SURGICAL HISTORY:  Past Surgical History:   Procedure Laterality Date    APPENDECTOMY      AV FISTULA PLACEMENT      EUSEBIO    COLONOSCOPY      CORNEAL TRANSPLANT  01/01/1997    LEFT EYE    CORNEAL TRANSPLANT  01/01/2010    RIGHT EYE    CORNEAL TRANSPLANT  01/01/2000    LEFT EYE    EYE SURGERY      KIDNEY TRANSPLANT  01/28/2016    TONSILLECTOMY          SOCIAL HISTORY:   reports that he has never smoked. He has never used smokeless tobacco. He reports current alcohol use. He reports that he does not use drugs.     FAMILY HISTORY:  Family History   Problem Relation Name Age of Onset    Hypertension Mother      Cancer Maternal Aunt      Kidney disease Maternal Aunt      Amblyopia Neg Hx      Blindness Neg Hx      Cataracts Neg Hx      Diabetes Neg Hx      Glaucoma Neg Hx      Macular degeneration Neg Hx      Retinal detachment Neg Hx      Strabismus Neg Hx      Stroke Neg Hx      Thyroid disease Neg Hx          ALLERGIES:   Review of patient's allergies indicates:  No Known Allergies    MEDICATIONS:    Current Outpatient Medications:      mycophenolate (CELLCEPT) 250 mg Cap, Take 2 capsules (500 mg total) by mouth 2 (two) times daily., Disp: , Rfl:     oxyCODONE-acetaminophen (PERCOCET) 5-325 mg per tablet, Take 1 tablet by mouth every 8 (eight) hours as needed for Pain., Disp: 10 each, Rfl: 0    tacrolimus (PROGRAF) 1 MG Cap, Take 2 capsules (2 mg total) by mouth every 12 (twelve) hours., Disp: 120 capsule, Rfl: 11    carvediloL (COREG) 12.5 MG tablet, Take 1 tablet (12.5 mg total) by mouth 2 (two) times daily., Disp: 180 tablet, Rfl: 0    pantoprazole (PROTONIX) 40 MG tablet, Take 1 tablet (40 mg total) by mouth once daily., Disp: 30 tablet, Rfl: 11    Review of Systems   Constitutional: Negative.    HENT: Negative.     Respiratory: Negative.     Cardiovascular: Negative.    Gastrointestinal:  Positive for abdominal pain.   Genitourinary: Negative.    Musculoskeletal: Negative.    Neurological: Negative.    All other systems reviewed and are negative.      Objective:      Physical Exam  Vitals reviewed.   Constitutional:       Appearance: Normal appearance.   HENT:      Right Ear: Tympanic membrane normal.      Left Ear: Tympanic membrane normal.   Eyes:      Conjunctiva/sclera: Conjunctivae normal.   Cardiovascular:      Rate and Rhythm: Normal rate and regular rhythm.      Pulses: Normal pulses.   Pulmonary:      Effort: Pulmonary effort is normal.      Breath sounds: Normal breath sounds.   Abdominal:      General: Bowel sounds are normal. There is distension (mild).      Comments: Scar from kidney transplant   Skin:     General: Skin is warm and dry.   Neurological:      Mental Status: He is alert.   Psychiatric:         Mood and Affect: Mood normal.         Behavior: Behavior normal.         Thought Content: Thought content normal.         Judgment: Judgment normal.         Vitals:    09/10/24 0909   BP: (!) 144/80   BP Location: Right arm   Patient Position: Sitting   BP Method: Large (Manual)   Pulse: (!) 59   Resp: 16   Temp: 98.1 °F (36.7  "°C)   TempSrc: Oral   SpO2: 98%   Weight: 85.7 kg (188 lb 15 oz)   Height: 5' 7" (1.702 m)     Body mass index is 29.59 kg/m².    RESULTS: Reviewed labs from last 12 months      Last Lab Results:     Lab Results   Component Value Date    WBC 10.30 08/09/2024    HGB 14.2 08/09/2024    HCT 41.0 08/09/2024     08/09/2024     08/09/2024    K 3.6 08/09/2024     08/09/2024    CO2 24 08/09/2024    BUN 16 08/09/2024    CREATININE 1.4 08/09/2024    CALCIUM 10.4 08/09/2024    ALBUMIN 3.8 08/09/2024    AST 21 08/09/2024    ALT 9 (L) 08/09/2024    CHOL 187 03/23/2016    TRIG 178 (H) 03/23/2016    HDL 55 03/23/2016    LDLCALC 96.4 03/23/2016    HGBA1C 5.7 04/17/2013    TSH 1.388 06/01/2022    PSA 1.1 02/12/2015       Assessment & Plan:       1. Hospital discharge follow-up    See HPI, doing rather well, back to work, will f-up at [facility].    2. Essential hypertension  -     carvediloL (COREG) 12.5 MG tablet; Take 1 tablet (12.5 mg total) by mouth 2 (two) times daily.  Dispense: 180 tablet; Refill: 0    BP a bit above goal, refilled Coreg.    3. Abdominal pain, unspecified abdominal location  -     pantoprazole (PROTONIX) 40 MG tablet; Take 1 tablet (40 mg total) by mouth once daily.  Dispense: 30 tablet; Refill: 11    Refilled PPI.    4. Chronic kidney disease, stage 3a    See most recent blood work.    5. S/P kidney transplant 1/28/2016    Will establish care with Nephrologist at [facility].    6. Renal mass, right    Will establish care with Nephrologist at [facility].    No follow-ups on file.    This note was created by combination of typed  and M-Modal dictation.  Transcription errors may be present.  If there are any questions, please contact me.    "

## 2024-09-10 ENCOUNTER — OFFICE VISIT (OUTPATIENT)
Dept: FAMILY MEDICINE | Facility: CLINIC | Age: 64
End: 2024-09-10

## 2024-09-10 VITALS
DIASTOLIC BLOOD PRESSURE: 80 MMHG | HEART RATE: 59 BPM | BODY MASS INDEX: 29.65 KG/M2 | HEIGHT: 67 IN | RESPIRATION RATE: 16 BRPM | WEIGHT: 188.94 LBS | TEMPERATURE: 98 F | SYSTOLIC BLOOD PRESSURE: 144 MMHG | OXYGEN SATURATION: 98 %

## 2024-09-10 DIAGNOSIS — N18.31 CHRONIC KIDNEY DISEASE, STAGE 3A: ICD-10-CM

## 2024-09-10 DIAGNOSIS — N28.89 RENAL MASS, RIGHT: ICD-10-CM

## 2024-09-10 DIAGNOSIS — R10.9 ABDOMINAL PAIN, UNSPECIFIED ABDOMINAL LOCATION: ICD-10-CM

## 2024-09-10 DIAGNOSIS — I10 ESSENTIAL HYPERTENSION: ICD-10-CM

## 2024-09-10 DIAGNOSIS — Z09 HOSPITAL DISCHARGE FOLLOW-UP: Primary | ICD-10-CM

## 2024-09-10 DIAGNOSIS — Z94.0 S/P KIDNEY TRANSPLANT: ICD-10-CM

## 2024-09-10 PROCEDURE — 99999 PR PBB SHADOW E&M-EST. PATIENT-LVL III: CPT | Mod: PBBFAC,,, | Performed by: INTERNAL MEDICINE

## 2024-09-10 PROCEDURE — 99214 OFFICE O/P EST MOD 30 MIN: CPT | Mod: S$PBB,,, | Performed by: INTERNAL MEDICINE

## 2024-09-10 PROCEDURE — 99213 OFFICE O/P EST LOW 20 MIN: CPT | Mod: PBBFAC,PO | Performed by: INTERNAL MEDICINE

## 2024-09-10 RX ORDER — CARVEDILOL 12.5 MG/1
12.5 TABLET ORAL 2 TIMES DAILY
Qty: 180 TABLET | Refills: 0 | Status: SHIPPED | OUTPATIENT
Start: 2024-09-10 | End: 2024-12-09

## 2024-09-10 RX ORDER — PANTOPRAZOLE SODIUM 40 MG/1
40 TABLET, DELAYED RELEASE ORAL DAILY
Qty: 30 TABLET | Refills: 11 | Status: SHIPPED | OUTPATIENT
Start: 2024-09-10 | End: 2025-09-10

## 2024-09-10 NOTE — PROGRESS NOTES
Health Maintenance Due   Topic     TETANUS VACCINE  CONSULT WITH PCP    Shingles Vaccine (1 of 2) CONSULT WITH PCP    Hemoglobin A1c (Diabetic Prevention Screening)  CONSULT WITH PCP    RSV Vaccine (Age 60+ and Pregnant patients) (1 - 1-dose 60+ series) Not offered at this facility.    Colorectal Cancer Screening  CONSULT WITH PCP    Lipid Panel  CONSULT WITH PCP    COVID-19 Vaccine (3 - Pfizer risk series) Not offered at this facility.    Influenza Vaccine (1) CONSULT WITH PCP

## 2024-11-06 ENCOUNTER — TELEPHONE (OUTPATIENT)
Dept: FAMILY MEDICINE | Facility: CLINIC | Age: 64
End: 2024-11-06

## 2024-11-06 NOTE — TELEPHONE ENCOUNTER
----- Message from Tech Cyndee sent at 11/6/2024 11:28 AM CST -----  Regarding: Patient call back  .Type: Patient Call Back    Who called:Yasmeen- with [facility] coordinator     What is the request in detail:calling to ask if patient will need an appointment for [facility] to get a list of medications     Can the clinic reply by [facility]?no     Would the patient rather a call back or a response via [facility]? Call     Best call back number:[phone]    Additional Information:.Previous patient of Dr. Tang

## 2024-11-06 NOTE — TELEPHONE ENCOUNTER
Informed rep Dr Tang no longer here so pt will need appt with new provider in order to get medications sent to new pharmacy

## 2024-12-09 DIAGNOSIS — I10 ESSENTIAL HYPERTENSION: ICD-10-CM

## 2024-12-09 RX ORDER — CARVEDILOL 12.5 MG/1
12.5 TABLET ORAL 2 TIMES DAILY
Qty: 180 TABLET | Refills: 0 | Status: SHIPPED | OUTPATIENT
Start: 2024-12-09 | End: 2025-03-09

## 2025-03-17 ENCOUNTER — PATIENT MESSAGE (OUTPATIENT)
Dept: OPTOMETRY | Facility: CLINIC | Age: 65
End: 2025-03-17
Payer: MEDICARE